# Patient Record
Sex: MALE | Race: WHITE | NOT HISPANIC OR LATINO | Employment: OTHER | URBAN - METROPOLITAN AREA
[De-identification: names, ages, dates, MRNs, and addresses within clinical notes are randomized per-mention and may not be internally consistent; named-entity substitution may affect disease eponyms.]

---

## 2017-07-17 ENCOUNTER — TRANSFERRED RECORDS (OUTPATIENT)
Dept: HEALTH INFORMATION MANAGEMENT | Facility: CLINIC | Age: 67
End: 2017-07-17

## 2017-07-25 ENCOUNTER — OFFICE VISIT (OUTPATIENT)
Dept: FAMILY MEDICINE | Facility: CLINIC | Age: 67
End: 2017-07-25
Payer: COMMERCIAL

## 2017-07-25 VITALS
WEIGHT: 183 LBS | RESPIRATION RATE: 16 BRPM | TEMPERATURE: 97 F | DIASTOLIC BLOOD PRESSURE: 70 MMHG | SYSTOLIC BLOOD PRESSURE: 130 MMHG | HEIGHT: 68 IN | BODY MASS INDEX: 27.74 KG/M2 | HEART RATE: 54 BPM

## 2017-07-25 DIAGNOSIS — Z23 NEED FOR PROPHYLACTIC VACCINATION WITH TETANUS-DIPHTHERIA (TD): ICD-10-CM

## 2017-07-25 DIAGNOSIS — Z11.59 NEED FOR HEPATITIS C SCREENING TEST: ICD-10-CM

## 2017-07-25 DIAGNOSIS — F41.9 ANXIETY: ICD-10-CM

## 2017-07-25 DIAGNOSIS — I10 HYPERTENSION GOAL BP (BLOOD PRESSURE) < 140/90: Chronic | ICD-10-CM

## 2017-07-25 DIAGNOSIS — Z00.00 ROUTINE MEDICAL EXAM: Primary | ICD-10-CM

## 2017-07-25 DIAGNOSIS — N40.0 BENIGN PROSTATIC HYPERPLASIA WITHOUT LOWER URINARY TRACT SYMPTOMS: ICD-10-CM

## 2017-07-25 DIAGNOSIS — K21.9 GASTROESOPHAGEAL REFLUX DISEASE WITHOUT ESOPHAGITIS: ICD-10-CM

## 2017-07-25 DIAGNOSIS — E78.5 HYPERLIPIDEMIA LDL GOAL <100: Chronic | ICD-10-CM

## 2017-07-25 DIAGNOSIS — F90.8 ATTENTION DEFICIT HYPERACTIVITY DISORDER (ADHD), OTHER TYPE: ICD-10-CM

## 2017-07-25 DIAGNOSIS — Z12.5 SCREENING FOR PROSTATE CANCER: ICD-10-CM

## 2017-07-25 DIAGNOSIS — F33.42 DEPRESSION, MAJOR, RECURRENT, IN COMPLETE REMISSION (H): Chronic | ICD-10-CM

## 2017-07-25 DIAGNOSIS — I10 ESSENTIAL HYPERTENSION WITH GOAL BLOOD PRESSURE LESS THAN 140/90: Chronic | ICD-10-CM

## 2017-07-25 DIAGNOSIS — M48.02 SPINAL STENOSIS OF CERVICAL REGION: ICD-10-CM

## 2017-07-25 LAB
ALBUMIN SERPL-MCNC: 4.3 G/DL (ref 3.4–5)
ALBUMIN UR-MCNC: NEGATIVE MG/DL
ALP SERPL-CCNC: 65 U/L (ref 40–150)
ALT SERPL W P-5'-P-CCNC: 44 U/L (ref 0–70)
ANION GAP SERPL CALCULATED.3IONS-SCNC: 4 MMOL/L (ref 3–14)
APPEARANCE UR: CLEAR
AST SERPL W P-5'-P-CCNC: 27 U/L (ref 0–45)
BASOPHILS # BLD AUTO: 0 10E9/L (ref 0–0.2)
BASOPHILS NFR BLD AUTO: 0.2 %
BILIRUB SERPL-MCNC: 0.8 MG/DL (ref 0.2–1.3)
BILIRUB UR QL STRIP: NEGATIVE
BUN SERPL-MCNC: 20 MG/DL (ref 7–30)
CALCIUM SERPL-MCNC: 8.9 MG/DL (ref 8.5–10.1)
CHLORIDE SERPL-SCNC: 104 MMOL/L (ref 94–109)
CHOLEST SERPL-MCNC: 159 MG/DL
CO2 SERPL-SCNC: 29 MMOL/L (ref 20–32)
COLOR UR AUTO: YELLOW
CREAT SERPL-MCNC: 0.78 MG/DL (ref 0.66–1.25)
DIFFERENTIAL METHOD BLD: NORMAL
EOSINOPHIL # BLD AUTO: 0.1 10E9/L (ref 0–0.7)
EOSINOPHIL NFR BLD AUTO: 1.8 %
ERYTHROCYTE [DISTWIDTH] IN BLOOD BY AUTOMATED COUNT: 13.3 % (ref 10–15)
GFR SERPL CREATININE-BSD FRML MDRD: ABNORMAL ML/MIN/1.7M2
GLUCOSE SERPL-MCNC: 100 MG/DL (ref 70–99)
GLUCOSE UR STRIP-MCNC: NEGATIVE MG/DL
HCT VFR BLD AUTO: 41.8 % (ref 40–53)
HCV AB SERPL QL IA: NORMAL
HDLC SERPL-MCNC: 67 MG/DL
HGB BLD-MCNC: 14 G/DL (ref 13.3–17.7)
HGB UR QL STRIP: NEGATIVE
KETONES UR STRIP-MCNC: NEGATIVE MG/DL
LDLC SERPL CALC-MCNC: 71 MG/DL
LEUKOCYTE ESTERASE UR QL STRIP: NEGATIVE
LYMPHOCYTES # BLD AUTO: 1 10E9/L (ref 0.8–5.3)
LYMPHOCYTES NFR BLD AUTO: 15.9 %
MCH RBC QN AUTO: 30.6 PG (ref 26.5–33)
MCHC RBC AUTO-ENTMCNC: 33.5 G/DL (ref 31.5–36.5)
MCV RBC AUTO: 92 FL (ref 78–100)
MONOCYTES # BLD AUTO: 0.6 10E9/L (ref 0–1.3)
MONOCYTES NFR BLD AUTO: 10.7 %
NEUTROPHILS # BLD AUTO: 4.3 10E9/L (ref 1.6–8.3)
NEUTROPHILS NFR BLD AUTO: 71.4 %
NITRATE UR QL: NEGATIVE
NONHDLC SERPL-MCNC: 92 MG/DL
PH UR STRIP: 6 PH (ref 5–7)
PLATELET # BLD AUTO: 201 10E9/L (ref 150–450)
POTASSIUM SERPL-SCNC: 4.2 MMOL/L (ref 3.4–5.3)
PROT SERPL-MCNC: 7.2 G/DL (ref 6.8–8.8)
PSA SERPL-ACNC: 3.48 UG/L (ref 0–4)
RBC # BLD AUTO: 4.57 10E12/L (ref 4.4–5.9)
RBC #/AREA URNS AUTO: NORMAL /HPF (ref 0–2)
SODIUM SERPL-SCNC: 137 MMOL/L (ref 133–144)
SP GR UR STRIP: 1.02 (ref 1–1.03)
TRIGL SERPL-MCNC: 105 MG/DL
URN SPEC COLLECT METH UR: NORMAL
UROBILINOGEN UR STRIP-ACNC: 0.2 EU/DL (ref 0.2–1)
WBC # BLD AUTO: 6 10E9/L (ref 4–11)
WBC #/AREA URNS AUTO: NORMAL /HPF (ref 0–2)

## 2017-07-25 PROCEDURE — 90471 IMMUNIZATION ADMIN: CPT | Mod: GY | Performed by: FAMILY MEDICINE

## 2017-07-25 PROCEDURE — 80053 COMPREHEN METABOLIC PANEL: CPT | Performed by: FAMILY MEDICINE

## 2017-07-25 PROCEDURE — 36415 COLL VENOUS BLD VENIPUNCTURE: CPT | Performed by: FAMILY MEDICINE

## 2017-07-25 PROCEDURE — G0103 PSA SCREENING: HCPCS | Performed by: FAMILY MEDICINE

## 2017-07-25 PROCEDURE — 81001 URINALYSIS AUTO W/SCOPE: CPT | Performed by: FAMILY MEDICINE

## 2017-07-25 PROCEDURE — 99397 PER PM REEVAL EST PAT 65+ YR: CPT | Mod: 25 | Performed by: FAMILY MEDICINE

## 2017-07-25 PROCEDURE — 80061 LIPID PANEL: CPT | Performed by: FAMILY MEDICINE

## 2017-07-25 PROCEDURE — 85025 COMPLETE CBC W/AUTO DIFF WBC: CPT | Performed by: FAMILY MEDICINE

## 2017-07-25 PROCEDURE — 90714 TD VACC NO PRESV 7 YRS+ IM: CPT | Mod: GY | Performed by: FAMILY MEDICINE

## 2017-07-25 PROCEDURE — 86803 HEPATITIS C AB TEST: CPT | Performed by: FAMILY MEDICINE

## 2017-07-25 RX ORDER — CYCLOBENZAPRINE HCL 10 MG
TABLET ORAL
Qty: 90 TABLET | Refills: 0 | Status: SHIPPED | OUTPATIENT
Start: 2017-07-25 | End: 2017-11-27

## 2017-07-25 RX ORDER — DUTASTERIDE 0.5 MG/1
0.5 CAPSULE, LIQUID FILLED ORAL DAILY
Qty: 90 CAPSULE | Refills: 3 | Status: SHIPPED | OUTPATIENT
Start: 2017-07-25 | End: 2018-08-21

## 2017-07-25 RX ORDER — MULTIVITAMIN WITH IRON
1 TABLET ORAL DAILY
COMMUNITY

## 2017-07-25 RX ORDER — ATORVASTATIN CALCIUM 80 MG/1
80 TABLET, FILM COATED ORAL DAILY
Qty: 90 TABLET | Refills: 3 | Status: SHIPPED | OUTPATIENT
Start: 2017-07-25 | End: 2018-08-21

## 2017-07-25 RX ORDER — LISINOPRIL 10 MG/1
10 TABLET ORAL DAILY
Qty: 90 TABLET | Refills: 3 | Status: SHIPPED | OUTPATIENT
Start: 2017-07-25 | End: 2018-08-21

## 2017-07-25 NOTE — PROGRESS NOTES
SUBJECTIVE:   Stuart Moran is a 67 year old male who presents for Preventive Visit.  click delete button to remove this line now  click delete button to remove this line now  Are you in the first 12 months of your Medicare Part B coverage?  No    Healthy Habits:    Do you get at least three servings of calcium containing foods daily (dairy, green leafy vegetables, etc.)? yes    Amount of exercise or daily activities, outside of work: 0 day(s) per week    Problems taking medications regularly No    Medication side effects: No    Have you had an eye exam in the past two years? yes    Do you see a dentist twice per year? yes    Do you have sleep apnea, excessive snoring or daytime drowsiness?no    COGNITIVE SCREEN  1) Repeat 3 items (Banana, Sunrise, Chair)    2) Clock draw: NORMAL  3) 3 item recall: Recalls 3 objects  Results: 3 items recalled: COGNITIVE IMPAIRMENT LESS LIKELY    Mini-CogTM Copyright DOUGLAS Juan. Licensed by the author for use in [facility]; reprinted with permission (main@[facility]). All rights reserved.            Reviewed and updated as needed this visit by clinical staffTobacco  Allergies  Meds         Reviewed and updated as needed this visit by Provider        Social History   Substance Use Topics     Smoking status: Never Smoker     Smokeless tobacco: Never Used     Alcohol use No       The patient does not drink >3 drinks per day nor >7 drinks per week.    Today's PHQ-2 Score:   PHQ-2 ( 1999 Pfizer) 7/25/2017 10/15/2015   Q1: Little interest or pleasure in doing things 0 0   Q2: Feeling down, depressed or hopeless 0 0   PHQ-2 Score 0 0   Q1: Little interest or pleasure in doing things - -   Q2: Feeling down, depressed or hopeless - -   PHQ-2 Score - -         Do you feel safe in your environment - Yes    Do you have a Health Care Directive?: Yes: Advance Directive has been received and scanned.    Current providers sharing in care for this patient include: Patient Care  "Team:  Lucas Tompkins MD as PCP - General (Family Practice)      Hearing impairment: No    Ability to successfully perform activities of daily living: Yes, no assistance needed     Fall risk:  Fallen 2 or more times in the past year?: No  Any fall with injury in the past year?: No      Home safety:  none identified  click delete button to remove this line now    The following health maintenance items are reviewed in Epic and correct as of today:Health Maintenance   Topic Date Due     HEPATITIS C SCREENING  01/27/1968     AORTIC ANEURYSM SCREENING (SYSTEM ASSIGNED)  01/27/2015     PNEUMOCOCCAL (2 of 2 - PPSV23) 10/15/2016     DEPRESSION ACTION PLAN Q1 YR  10/15/2016     PHQ-9 Q6 MONTHS  12/06/2016     TETANUS IMMUNIZATION (SYSTEM ASSIGNED)  02/01/2017     FALL RISK ASSESSMENT  03/14/2017     LIPID MONITORING Q1 YEAR  06/06/2017     INFLUENZA VACCINE (SYSTEM ASSIGNED)  09/01/2017     ADVANCE DIRECTIVE PLANNING Q5 YRS  08/22/2018     COLON CANCER SCREEN (SYSTEM ASSIGNED)  03/02/2020     Labs reviewed in EPIC        ROS:  C: NEGATIVE for fever, chills, change in weight  I: NEGATIVE for worrisome rashes, moles or lesions  E: NEGATIVE for vision changes or irritation  E/M: NEGATIVE for ear, mouth and throat problems  R: NEGATIVE for significant cough or SOB  B: NEGATIVE for masses, tenderness or discharge  CV: NEGATIVE for chest pain, palpitations or peripheral edema  GI: NEGATIVE for nausea, abdominal pain, heartburn, or change in bowel habits  : NEGATIVE for frequency, dysuria, or hematuria  MUSCULOSKELETAL:POSITIVE  for arthralgias  and neck pain  N: NEGATIVE for weakness, dizziness or paresthesias  E: NEGATIVE for temperature intolerance, skin/hair changes  H: NEGATIVE for bleeding problems  P: NEGATIVE for changes in mood or affect    OBJECTIVE:   /70  Pulse 54  Temp 97  F (36.1  C) (Tympanic)  Resp 16  Ht 5' 8\" (1.727 m)  Wt 183 lb (83 kg)  BMI 27.83 kg/m2 Estimated body mass index is 27.83 " "kg/(m^2) as calculated from the following:    Height as of this encounter: 5' 8\" (1.727 m).    Weight as of this encounter: 183 lb (83 kg).  EXAM:   GENERAL: healthy, alert and no distress  EYES: Eyes grossly normal to inspection, PERRL and conjunctivae and sclerae normal  HENT: ear canals and TM's normal, nose and mouth without ulcers or lesions  NECK: no adenopathy, no asymmetry, masses, or scars and thyroid normal to palpation  RESP: lungs clear to auscultation - no rales, rhonchi or wheezes  CV: regular rate and rhythm, normal S1 S2, no S3 or S4, no murmur, click or rub, no peripheral edema and peripheral pulses strong  ABDOMEN: soft, nontender, no hepatosplenomegaly, no masses and bowel sounds normal   (male): normal male genitalia without lesions or urethral discharge, no hernia  RECTAL: normal sphincter tone, no rectal masses, prostate normal size, smooth, nontender without nodules or masses  MS: no gross musculoskeletal defects noted, no edema  SKIN: no suspicious lesions or rashes  NEURO: Normal strength and tone, mentation intact and speech normal  PSYCH: mentation appears normal, affect normal/bright  LYMPH: no cervical, supraclavicular, axillary, or inguinal adenopathy    ASSESSMENT / PLAN:       ICD-10-CM    1. Routine medical exam Z00.00    2. Hypertension goal BP (blood pressure) < 140/90 I10    3. Depression, major, recurrent, in complete remission (H) F33.42    4. Hyperlipidemia LDL goal <100 E78.5    5. Need for hepatitis C screening test Z11.59    6. Need for prophylactic vaccination with tetanus-diphtheria (TD) Z23    7. Attention deficit hyperactivity disorder (ADHD), other type F90.8    8. Benign prostatic hyperplasia without lower urinary tract symptoms N40.0    9. Spinal stenosis of cervical region M48.02    10. Anxiety F41.9    11. Gastroesophageal reflux disease without esophagitis K21.9        End of Life Planning:  Patient currently has an advanced directive: Yes.  Practitioner is " "supportive of decision.    COUNSELING:  Reviewed preventive health counseling, as reflected in patient instructions       Dental care       Immunizations    Vaccinated for: Td              Estimated body mass index is 27.83 kg/(m^2) as calculated from the following:    Height as of this encounter: 5' 8\" (1.727 m).    Weight as of this encounter: 183 lb (83 kg).     reports that he has never smoked. He has never used smokeless tobacco.        Appropriate preventive services were discussed with this patient, including applicable screening as appropriate for cardiovascular disease, diabetes, osteopenia/osteoporosis, and glaucoma.  As appropriate for age/gender, discussed screening for colorectal cancer, prostate cancer, breast cancer, and cervical cancer. Checklist reviewing preventive services available has been given to the patient.    Reviewed patients plan of care and provided an AVS. The Basic Care Plan (routine screening as documented in Health Maintenance) for Stuart meets the Care Plan requirement. This Care Plan has been established and reviewed with the Patient.    Counseling Resources:  ATP IV Guidelines  Pooled Cohorts Equation Calculator  Breast Cancer Risk Calculator  FRAX Risk Assessment  ICSI Preventive Guidelines  Dietary Guidelines for Americans, 2010  USDA's MyPlate  ASA Prophylaxis  Lung CA Screening    Lucas Tompkins MD  [facility]  "

## 2017-07-25 NOTE — NURSING NOTE
Prior to injection verified patient identity using patient's name and date of birth.    Screening Questionnaire for Adult Immunization    Are you sick today?   No   Do you have allergies to medications, food, a vaccine component or latex?   No   Have you ever had a serious reaction after receiving a vaccination?   No   Do you have a long-term health problem with heart disease, lung disease, asthma, kidney disease, metabolic disease (e.g. diabetes), anemia, or other blood disorder?   No   Do you have cancer, leukemia, HIV/AIDS, or any other immune system problem?   No   In the past 3 months, have you taken medications that affect  your immune system, such as prednisone, other steroids, or anticancer drugs; drugs for the treatment of rheumatoid arthritis, Crohn s disease, or psoriasis; or have you had radiation treatments?   No   Have you had a seizure, or a brain or other nervous system problem?   No   During the past year, have you received a transfusion of blood or blood     products, or been given immune (gamma) globulin or antiviral drug?   No   For women: Are you pregnant or is there a chance you could become        pregnant during the next month?   No   Have you received any vaccinations in the past 4 weeks?   No     Immunization questionnaire answers were all negative.      MNVFC doesn't apply on this patient    Per orders of Dr. Tompkins, injection of Td given by Beth Lopes. Patient instructed to remain in clinic for 15 minutes afterwards, and to report any adverse reaction to me immediately.       Screening performed by Beth Lopes on 7/25/2017 at 10:29 AM.

## 2017-07-25 NOTE — MR AVS SNAPSHOT
After Visit Summary   7/25/2017    Stuart Moran    MRN: [id number]           Patient Information     Date Of Birth          1950        Visit Information        Provider Department      7/25/2017 9:45 AM Lucas Tompkins MD [facility]        Today's Diagnoses     Routine medical exam    -  1    Hypertension goal BP (blood pressure) < 140/90        Depression, major, recurrent, in complete remission (H)        Hyperlipidemia LDL goal <100        Need for hepatitis C screening test        Need for prophylactic vaccination with tetanus-diphtheria (TD)        Attention deficit hyperactivity disorder (ADHD), other type        Benign prostatic hyperplasia without lower urinary tract symptoms        Spinal stenosis of cervical region        Anxiety        Gastroesophageal reflux disease without esophagitis        Screening for prostate cancer        Essential hypertension with goal blood pressure less than 140/90          Care Instructions      Preventive Health Recommendations:   Male Ages 65 and over    Yearly exam:             See your health care provider every year in order to  o   Review health changes.   o   Discuss preventive care.    o   Review your medicines if your doctor has prescribed any.    Talk with your health care provider about whether you should have a test to screen for prostate cancer (PSA).    Every 3 years, have a diabetes test (fasting glucose). If you are at risk for diabetes, you should have this test more often.    Every 5 years, have a cholesterol test. Have this test more often if you are at risk for high cholesterol or heart disease.     Every 10 years, have a colonoscopy. Or, have a yearly FIT test (stool test). These exams will check for colon cancer.    Talk to with your health care provider about screening for Abdominal Aortic Aneurysm if you have a family history of AAA or have a history of smoking.    Shots:     Get a flu shot  each year.     Get a tetanus shot every 10 years.     Talk to your doctor about your pneumonia vaccines. There are now two you should receive - Pneumovax (PPSV 23) and Prevnar (PCV 13).     Talk to your doctor about a shingles vaccine.     Talk to your doctor about the hepatitis B vaccine.  Nutrition:     Eat at least 5 servings of fruits and vegetables each day.     Eat whole-grain bread, whole-wheat pasta and brown rice instead of white grains and rice.     Talk to your provider about Calcium and Vitamin D.   Lifestyle    Exercise for at least 150 minutes a week (30 minutes a day, 5 days a week). This will help you control your weight and prevent disease.     Limit alcohol to one drink per day.     No smoking.     Wear sunscreen to prevent skin cancer.     See your dentist every six months for an exam and cleaning.     See your eye doctor every 1 to 2 years to screen for conditions such as glaucoma, macular degeneration, cataracts, etc           Follow-ups after your visit        Who to contact     If you have questions or need follow up information about today's clinic visit or your schedule please contact [facility] directly at [phone].  Normal or non-critical lab and imaging results will be communicated to you by [email or web address], letter or phone within 4 business days after the clinic has received the results. If you do not hear from us within 7 days, please contact the clinic through [email or web address] or phone. If you have a critical or abnormal lab result, we will notify you by phone as soon as possible.  Submit refill requests through [employer] or call your pharmacy and they will forward the refill request to us. Please allow 3 business days for your refill to be completed.          Additional Information About Your Visit        [email or web address] Information     Possible Web gives you secure access to your electronic health record. If you see a primary care provider, you can also send messages to your care  "team and make appointments. If you have questions, please call your primary care clinic.  If you do not have a primary care provider, please call [phone] and they will assist you.        Care EveryWhere ID     This is your Care EveryWhere ID. This could be used by other organizations to access your [place] medical records  [id number]        Your Vitals Were     Pulse Temperature Respirations Height BMI (Body Mass Index)       54 97  F (36.1  C) (Tympanic) 16 5' 8\" (1.727 m) 27.83 kg/m2        Blood Pressure from Last 3 Encounters:   07/25/17 130/70   06/06/16 136/80   03/14/16 126/70    Weight from Last 3 Encounters:   07/25/17 183 lb (83 kg)   06/06/16 187 lb (84.8 kg)   03/14/16 190 lb (86.2 kg)              We Performed the Following     ADMIN 1st VACCINE     CBC with platelets differential     Comprehensive metabolic panel     DEPRESSION ACTION PLAN (DAP)     Hepatitis C Screen Reflex to HCV RNA Quant and Genotype     Lipid panel reflex to direct LDL     Prostate spec antigen screen     TD (ADULT, 7+) PRESERVE FREE     UA with Microscopic          Today's Medication Changes          These changes are accurate as of: 7/25/17 11:22 AM.  If you have any questions, ask your nurse or doctor.               Start taking these medicines.        Dose/Directions    ranitidine 150 MG tablet   Commonly known as:  ZANTAC   Used for:  Gastroesophageal reflux disease without esophagitis   Started by:  Lucas Tompkins MD        Dose:  150 mg   Take 1 tablet (150 mg) by mouth 2 times daily   Quantity:  180 tablet   Refills:  3         These medicines have changed or have updated prescriptions.        Dose/Directions    atorvastatin 80 MG tablet   Commonly known as:  LIPITOR   This may have changed:  See the new instructions.   Used for:  Hyperlipidemia LDL goal <100   Changed by:  Lucas Tompkins MD        Dose:  80 mg   Take 1 tablet (80 mg) by mouth daily   Quantity:  90 tablet   Refills:  3       " cyclobenzaprine 10 MG tablet   Commonly known as:  FLEXERIL   This may have changed:  See the new instructions.   Used for:  Spinal stenosis of cervical region   Changed by:  Lucas Tompkins MD        TAKE 1 TABLET BY MOUTH THREE TIMES DAILY AS NEEDED FOR MUSCLE SPASMS.   Quantity:  90 tablet   Refills:  0       lisinopril 10 MG tablet   Commonly known as:  PRINIVIL/ZESTRIL   This may have changed:  See the new instructions.   Used for:  Essential hypertension with goal blood pressure less than 140/90   Changed by:  Lucas Tompkins MD        Dose:  10 mg   Take 1 tablet (10 mg) by mouth daily   Quantity:  90 tablet   Refills:  3         Stop taking these medicines if you haven't already. Please contact your care team if you have questions.     aspirin 81 MG tablet   Stopped by:  Lucas Tompkins MD           doxepin 50 MG capsule   Commonly known as:  SINEquan   Stopped by:  Lucas Tompkins MD           hydrochlorothiazide 25 MG tablet   Commonly known as:  HYDRODIURIL   Stopped by:  Lucas Tompkins MD           omeprazole 40 MG capsule   Commonly known as:  priLOSEC   Stopped by:  Lucas Tompkins MD                Where to get your medicines      These medications were sent to [id number] - [place], [place] - [address] AT [facility] [place] [facility]  [address], [place] [address]    Hours:  24-hours Phone:  [phone]     atorvastatin 80 MG tablet    cyclobenzaprine 10 MG tablet    dutasteride 0.5 MG capsule    lisinopril 10 MG tablet    ranitidine 150 MG tablet                Primary Care Provider Office Phone # Fax #    Lucas Tompkins [phone]       [facility] [address]  [place] [address]        Equal Access to Services     LUIS FELIPE SANTIAGO AH: Hadii aad ku hadasho Soomaali, waaxda luqadaha, qaybta kaalmada adeegyada, waxay darain haybarbaran joselin cullen. So [facility]  [phone].    ATENCIÓN: Si arjun can, tiene a carl disposición servicios gratuitos de asistencia lingüística. Padmini verduzco [phone].    We comply with applicable federal civil rights laws and [place] laws. We do not discriminate on the basis of race, color, national origin, age, disability sex, sexual orientation or gender identity.            Thank you!     Thank you for choosing [facility]  for your care. Our goal is always to provide you with excellent care. Hearing back from our patients is one way we can continue to improve our services. Please take a few minutes to complete the written survey that you may receive in the mail after your visit with us. Thank you!             Your Updated Medication List - Protect others around you: Learn how to safely use, store and throw away your medicines at www.disposemymeds.org.          This list is accurate as of: 7/25/17 11:22 AM.  Always use your most recent med list.                   Brand Name Dispense Instructions for use Diagnosis    ALEVE 220 MG capsule   Generic drug:  naproxen sodium      Take 220 mg by mouth 2 times daily (with meals)        atorvastatin 80 MG tablet    LIPITOR    90 tablet    Take 1 tablet (80 mg) by mouth daily    Hyperlipidemia LDL goal <100       cyclobenzaprine 10 MG tablet    FLEXERIL    90 tablet    TAKE 1 TABLET BY MOUTH THREE TIMES DAILY AS NEEDED FOR MUSCLE SPASMS.    Spinal stenosis of cervical region       dutasteride 0.5 MG capsule    AVODART    90 capsule    Take 1 capsule (0.5 mg) by mouth daily    Benign prostatic hyperplasia without lower urinary tract symptoms       EFFEXOR  MG 24 hr capsule   Generic drug:  venlafaxine      Take 2 capsules by mouth daily        lisinopril 10 MG tablet    PRINIVIL/ZESTRIL    90 tablet    Take 1 tablet (10 mg) by mouth daily    Essential hypertension with goal blood pressure less than 140/90       magnesium 250 MG tablet      Take 1 tablet by mouth  daily        ranitidine 150 MG tablet    ZANTAC    180 tablet    Take 1 tablet (150 mg) by mouth 2 times daily    Gastroesophageal reflux disease without esophagitis       VITAMIN D (CHOLECALCIFEROL) PO      Take 5,000 Units by mouth daily        VYVANSE 70 MG capsule   Generic drug:  lisdexamfetamine

## 2017-07-25 NOTE — NURSING NOTE
"Chief Complaint   Patient presents with     Physical       Initial /70  Pulse 54  Temp 97  F (36.1  C) (Tympanic)  Resp 16  Ht 5' 8\" (1.727 m)  Wt 183 lb (83 kg)  BMI 27.83 kg/m2 Estimated body mass index is 27.83 kg/(m^2) as calculated from the following:    Height as of this encounter: 5' 8\" (1.727 m).    Weight as of this encounter: 183 lb (83 kg).  Medication Reconciliation: complete     Beth Lopes CMA      "

## 2017-07-26 ASSESSMENT — PATIENT HEALTH QUESTIONNAIRE - PHQ9: SUM OF ALL RESPONSES TO PHQ QUESTIONS 1-9: 0

## 2017-07-27 NOTE — PROGRESS NOTES
Dear Stuart,    Your tests were all normal. A copy of your tests are available in My Chart.    Glad to see you at your appointment.  If you have any questions feel free to call.      Sincerely,      JACE Zurita.

## 2017-07-31 ENCOUNTER — TELEPHONE (OUTPATIENT)
Dept: FAMILY MEDICINE | Facility: CLINIC | Age: 67
End: 2017-07-31

## 2017-09-11 ENCOUNTER — TRANSFERRED RECORDS (OUTPATIENT)
Dept: HEALTH INFORMATION MANAGEMENT | Facility: CLINIC | Age: 67
End: 2017-09-11

## 2017-11-13 ENCOUNTER — OFFICE VISIT (OUTPATIENT)
Dept: FAMILY MEDICINE | Facility: CLINIC | Age: 67
End: 2017-11-13
Payer: COMMERCIAL

## 2017-11-13 VITALS
WEIGHT: 185 LBS | HEIGHT: 68 IN | OXYGEN SATURATION: 99 % | DIASTOLIC BLOOD PRESSURE: 80 MMHG | TEMPERATURE: 97.7 F | HEART RATE: 64 BPM | BODY MASS INDEX: 28.04 KG/M2 | SYSTOLIC BLOOD PRESSURE: 126 MMHG | RESPIRATION RATE: 16 BRPM

## 2017-11-13 DIAGNOSIS — F90.0 ATTENTION DEFICIT HYPERACTIVITY DISORDER (ADHD), PREDOMINANTLY INATTENTIVE TYPE: ICD-10-CM

## 2017-11-13 DIAGNOSIS — F33.42 DEPRESSION, MAJOR, RECURRENT, IN COMPLETE REMISSION (H): Chronic | ICD-10-CM

## 2017-11-13 DIAGNOSIS — I10 HYPERTENSION GOAL BP (BLOOD PRESSURE) < 140/90: Chronic | ICD-10-CM

## 2017-11-13 DIAGNOSIS — Z01.818 PREOP GENERAL PHYSICAL EXAM: Primary | ICD-10-CM

## 2017-11-13 DIAGNOSIS — M26.53: ICD-10-CM

## 2017-11-13 DIAGNOSIS — E78.5 HYPERLIPIDEMIA LDL GOAL <100: Chronic | ICD-10-CM

## 2017-11-13 LAB
ALBUMIN UR-MCNC: NEGATIVE MG/DL
APPEARANCE UR: CLEAR
BASOPHILS # BLD AUTO: 0 10E9/L (ref 0–0.2)
BASOPHILS NFR BLD AUTO: 0.2 %
BILIRUB UR QL STRIP: NEGATIVE
COLOR UR AUTO: YELLOW
DIFFERENTIAL METHOD BLD: ABNORMAL
EOSINOPHIL # BLD AUTO: 0.1 10E9/L (ref 0–0.7)
EOSINOPHIL NFR BLD AUTO: 1.7 %
ERYTHROCYTE [DISTWIDTH] IN BLOOD BY AUTOMATED COUNT: 12.9 % (ref 10–15)
GLUCOSE UR STRIP-MCNC: NEGATIVE MG/DL
HCT VFR BLD AUTO: 39 % (ref 40–53)
HGB BLD-MCNC: 13.4 G/DL (ref 13.3–17.7)
HGB UR QL STRIP: NEGATIVE
KETONES UR STRIP-MCNC: NEGATIVE MG/DL
LEUKOCYTE ESTERASE UR QL STRIP: NEGATIVE
LYMPHOCYTES # BLD AUTO: 1 10E9/L (ref 0.8–5.3)
LYMPHOCYTES NFR BLD AUTO: 16.4 %
MCH RBC QN AUTO: 31.3 PG (ref 26.5–33)
MCHC RBC AUTO-ENTMCNC: 34.4 G/DL (ref 31.5–36.5)
MCV RBC AUTO: 91 FL (ref 78–100)
MONOCYTES # BLD AUTO: 0.7 10E9/L (ref 0–1.3)
MONOCYTES NFR BLD AUTO: 11.5 %
NEUTROPHILS # BLD AUTO: 4.1 10E9/L (ref 1.6–8.3)
NEUTROPHILS NFR BLD AUTO: 70.2 %
NITRATE UR QL: NEGATIVE
PH UR STRIP: 6.5 PH (ref 5–7)
PLATELET # BLD AUTO: 210 10E9/L (ref 150–450)
RBC # BLD AUTO: 4.28 10E12/L (ref 4.4–5.9)
RBC #/AREA URNS AUTO: NORMAL /HPF
SOURCE: NORMAL
SP GR UR STRIP: 1.01 (ref 1–1.03)
UROBILINOGEN UR STRIP-ACNC: 0.2 EU/DL (ref 0.2–1)
WBC # BLD AUTO: 5.9 10E9/L (ref 4–11)
WBC #/AREA URNS AUTO: NORMAL /HPF

## 2017-11-13 PROCEDURE — 99214 OFFICE O/P EST MOD 30 MIN: CPT | Mod: 25 | Performed by: FAMILY MEDICINE

## 2017-11-13 PROCEDURE — 80048 BASIC METABOLIC PNL TOTAL CA: CPT | Performed by: FAMILY MEDICINE

## 2017-11-13 PROCEDURE — 85025 COMPLETE CBC W/AUTO DIFF WBC: CPT | Performed by: FAMILY MEDICINE

## 2017-11-13 PROCEDURE — 81001 URINALYSIS AUTO W/SCOPE: CPT | Performed by: FAMILY MEDICINE

## 2017-11-13 PROCEDURE — 36415 COLL VENOUS BLD VENIPUNCTURE: CPT | Performed by: FAMILY MEDICINE

## 2017-11-13 PROCEDURE — 93000 ELECTROCARDIOGRAM COMPLETE: CPT | Performed by: FAMILY MEDICINE

## 2017-11-13 RX ORDER — GINSENG 100 MG
1 CAPSULE ORAL DAILY
COMMUNITY

## 2017-11-13 RX ORDER — MULTIVIT WITH MINERALS/LUTEIN
1 TABLET ORAL DAILY
COMMUNITY

## 2017-11-13 NOTE — NURSING NOTE
"Chief Complaint   Patient presents with     Pre-Op Exam       Initial /80  Pulse 64  Temp 97.7  F (36.5  C) (Tympanic)  Resp 16  Ht 5' 8\" (1.727 m)  Wt 185 lb (83.9 kg)  SpO2 99%  BMI 28.13 kg/m2 Estimated body mass index is 28.13 kg/(m^2) as calculated from the following:    Height as of this encounter: 5' 8\" (1.727 m).    Weight as of this encounter: 185 lb (83.9 kg).  Medication Reconciliation: complete     Beth Lopes CMA      "

## 2017-11-13 NOTE — MR AVS SNAPSHOT
After Visit Summary   11/13/2017    Stuart Moran    MRN: [id number]           Patient Information     Date Of Birth          1950        Visit Information        Provider Department      11/13/2017 1:30 PM Lucas Tompkins MD [facility]        Today's Diagnoses     Preop general physical exam    -  1    Mandible deviation open/close        Hypertension goal BP (blood pressure) < 140/90        Depression, major, recurrent, in complete remission (H)        Hyperlipidemia LDL goal <100        Attention deficit hyperactivity disorder (ADHD), predominantly inattentive type          Care Instructions      Before Your Surgery      Call your surgeon if there is any change in your health. This includes signs of a cold or flu (such as a sore throat, runny nose, cough, rash or fever).    Do not smoke, drink alcohol or take over the counter medicine (unless your surgeon or primary care doctor tells you to) for the 24 hours before and after surgery.    If you take prescribed drugs: Follow your doctor s orders about which medicines to take and which to stop until after surgery.    Eating and drinking prior to surgery: follow the instructions from your surgeon    Take a shower or bath the night before surgery. Use the soap your surgeon gave you to gently clean your skin. If you do not have soap from your surgeon, use your regular soap. Do not shave or scrub the surgery site.  Wear clean pajamas and have clean sheets on your bed.           Follow-ups after your visit        Your next 10 appointments already scheduled     Nov 29, 2017   Procedure with Daniel Hyde DDS   [facility] (--)    [address]  [facility] [id number] [phone]              Who to contact     If you have questions or need follow up information about today's clinic visit or your schedule please contact [facility] directly at  "[phone].  Normal or non-critical lab and imaging results will be communicated to you by MyChart, letter or phone within 4 business days after the clinic has received the results. If you do not hear from us within 7 days, please contact the clinic through [employer] or phone. If you have a critical or abnormal lab result, we will notify you by phone as soon as possible.  Submit refill requests through [employer] or call your pharmacy and they will forward the refill request to us. Please allow 3 business days for your refill to be completed.          Additional Information About Your Visit        [employer] Information     [employer] gives you secure access to your electronic health record. If you see a primary care provider, you can also send messages to your care team and make appointments. If you have questions, please call your primary care clinic.  If you do not have a primary care provider, please call [phone] and they will assist you.        Care EveryWhere ID     This is your Care EveryWhere ID. This could be used by other organizations to access your [place] medical records  [id number]        Your Vitals Were     Pulse Temperature Respirations Height Pulse Oximetry BMI (Body Mass Index)    64 97.7  F (36.5  C) (Tympanic) 16 5' 8\" (1.727 m) 99% 28.13 kg/m2       Blood Pressure from Last 3 Encounters:   11/13/17 126/80   07/25/17 130/70   06/06/16 136/80    Weight from Last 3 Encounters:   11/13/17 185 lb (83.9 kg)   07/25/17 183 lb (83 kg)   06/06/16 187 lb (84.8 kg)              We Performed the Following     Basic metabolic panel     CBC with platelets differential     EKG 12-lead complete w/read - Clinics     UA with Microscopic        Primary Care Provider Office Phone # Fax #    Lucas Tompkins [phone] [address] [facility] [address]        Equal Access to Services     LUIS FELIPE SANTIAGO AH: Kayli Johnson, waaxda luqadaha, qaybta virgil mena, " mihai cabral olga cassidy'aan ah. So [facility] [phone].    ATENCIÓN: Si arjun can, tiene a carl disposición servicios gratuitos de asistencia lingüística. Padmini verduzco [phone].    We comply with applicable federal civil rights laws and [place] laws. We do not discriminate on the basis of race, color, national origin, age, disability, sex, sexual orientation, or gender identity.            Thank you!     Thank you for choosing [facility]  for your care. Our goal is always to provide you with excellent care. Hearing back from our patients is one way we can continue to improve our services. Please take a few minutes to complete the written survey that you may receive in the mail after your visit with us. Thank you!             Your Updated Medication List - Protect others around you: Learn how to safely use, store and throw away your medicines at www.disposemymeds.org.          This list is accurate as of: 11/13/17  2:33 PM.  Always use your most recent med list.                   Brand Name Dispense Instructions for use Diagnosis    ALEVE 220 MG capsule   Generic drug:  naproxen sodium      Take 220 mg by mouth 2 times daily (with meals)        atorvastatin 80 MG tablet    LIPITOR    90 tablet    Take 1 tablet (80 mg) by mouth daily    Hyperlipidemia LDL goal <100       CENTRUM SILVER per tablet      Take 1 tablet by mouth daily        cyclobenzaprine 10 MG tablet    FLEXERIL    90 tablet    TAKE 1 TABLET BY MOUTH THREE TIMES DAILY AS NEEDED FOR MUSCLE SPASMS.    Spinal stenosis of cervical region       dutasteride 0.5 MG capsule    AVODART    90 capsule    Take 1 capsule (0.5 mg) by mouth daily    Benign prostatic hyperplasia without lower urinary tract symptoms       EFFEXOR  MG 24 hr capsule   Generic drug:  venlafaxine      Take 2 capsules by mouth daily        lisinopril 10 MG tablet    PRINIVIL/ZESTRIL    90 tablet    Take 1 tablet (10 mg) by mouth daily     Essential hypertension with goal blood pressure less than 140/90       magnesium 250 MG tablet      Take 1 tablet by mouth daily        OSTEO BI-FLEX REGULAR STRENGTH 250-200 MG Tabs   Generic drug:  Glucosamine-Chondroitin           ranitidine 150 MG tablet    ZANTAC    180 tablet    Take 1 tablet (150 mg) by mouth 2 times daily    Gastroesophageal reflux disease without esophagitis       VITAMIN D (CHOLECALCIFEROL) PO      Take 5,000 Units by mouth daily        VITAMIN E NATURAL PO      Take 400 Units by mouth daily        VYVANSE 70 MG capsule   Generic drug:  lisdexamfetamine           zinc 50 MG Tabs      Take 1 tablet by mouth daily

## 2017-11-13 NOTE — PROGRESS NOTES
[facility]  [address] [address]  [phone]  Dept: [phone]    PRE-OP EVALUATION:  Today's date: 2017    Stuart Moran (: 1950) presents for pre-operative evaluation assessment as requested by Dr. Hyed.  He requires evaluation and anesthesia risk assessment prior to undergoing surgery/procedure for treatment of Oral-Maxillofacial .  Proposed procedure: COSMETIC OSTEOTOMY SAGITTAL SPLIT WITH MANDIBLE ADVANCEMENT    Date of Surgery/ Procedure: 17  Time of Surgery/ Procedure:   Hospital/Surgical Facility: [facility]  Fax number for surgical facility:   Primary Physician: Lucas Tompkins  Type of Anesthesia Anticipated: General    Patient has a Health Care Directive or Living Will:  yes    Preop Questions 2017   1.  Do you have a history of heart attack, stroke, stent, bypass or surgery on an artery in the head, neck, heart or legs? No   2.  Do you ever have any pain or discomfort in your chest? No   3.  Do you have a history of  Heart Failure? No   4.   Are you troubled by shortness of breath when:  walking on a level surface, or up a slight hill, or at night? No   5.  Do you currently have a cold, bronchitis or other respiratory infection? No   6.  Do you have a cough, shortness of breath, or wheezing? No   7.  Do you sometimes get pains in the calves of your legs when you walk? No   8. Do you or anyone in your family have previous history of blood clots? No   9.  Do you or does anyone in your family have a serious bleeding problem such as prolonged bleeding following surgeries or cuts? No   10. Have you ever had problems with anemia or been told to take iron pills? No   11. Have you had any abnormal blood loss such as black, tarry or bloody stools? No           HPI:                                                      Brief HPI related to upcoming procedure:       See problem list for active medical problems.   Problems all longstanding and stable, except as noted/documented.  See ROS for pertinent symptoms related to these conditions.                                                                                                  .    MEDICAL HISTORY:                                                    Patient Active Problem List    Diagnosis Date Noted     Screening for prostate cancer 07/25/2017     Priority: Medium     Gastroesophageal reflux disease without esophagitis 03/14/2016     Priority: Medium     Back muscle spasm 05/16/2015     Priority: Medium     Anxiety      Priority: Medium     ACP (advance care planning) 08/22/2013     Priority: Medium     Advance Care Planning:   Receipt of ACP document:  Received: Health Care Directive which was witnessed or notarized on 6-9-97.  Document previously scanned on 12-3-07 in [employer]-converted to [employer] on 8/22/13.  Validation form completed and sent to be scanned.  Code Status needs to be updated to reflect patient choices.  Confirmed/documented designated decision maker(s). See permanent comments section of demographics in clinical tab. View document(s) and details by clicking on code status. Added by Tosha Martinez RN, System ACP Coordinator on 8/22/2013.             Hypertension goal BP (blood pressure) < 140/90 08/15/2013     Priority: Medium     Spinal stenosis of cervical region 08/15/2013     Priority: Medium     ADHD (attention deficit hyperactivity disorder)      Priority: Medium     Depression, major, recurrent, in complete remission (H)      Priority: Medium     Insomnia      Priority: Medium     Hyperlipidemia LDL goal <100      Priority: Medium     BPH (benign prostatic hyperplasia)      Priority: Medium      Past Medical History:   Diagnosis Date     ADHD (attention deficit hyperactivity disorder)      Anxiety      Arthritis      BPH (benign prostatic hyperplasia)      Depression, major, recurrent, in complete remission (H)      Hyperlipidemia LDL goal <100       Hypertension      Insomnia      Past Surgical History:   Procedure Laterality Date     APPENDECTOMY       CARPAL TUNNEL RELEASE RT/LT      bilateral     CATARACT IOL, RT/LT Bilateral 2016     EYE SURGERY      bilateral strabismus     HERNIA REPAIR      umbilical     ORTHOPEDIC SURGERY      left knee meniscus     Current Outpatient Prescriptions   Medication Sig Dispense Refill     Multiple Vitamins-Minerals (CENTRUM SILVER) per tablet Take 1 tablet by mouth daily       Glucosamine-Chondroitin (OSTEO BI-FLEX REGULAR STRENGTH) 250-200 MG TABS        zinc 50 MG TABS Take 1 tablet by mouth daily       VITAMIN E NATURAL PO Take 400 Units by mouth daily       VITAMIN D, CHOLECALCIFEROL, PO Take 5,000 Units by mouth daily       magnesium 250 MG tablet Take 1 tablet by mouth daily       atorvastatin (LIPITOR) 80 MG tablet Take 1 tablet (80 mg) by mouth daily 90 tablet 3     lisinopril (PRINIVIL/ZESTRIL) 10 MG tablet Take 1 tablet (10 mg) by mouth daily 90 tablet 3     cyclobenzaprine (FLEXERIL) 10 MG tablet TAKE 1 TABLET BY MOUTH THREE TIMES DAILY AS NEEDED FOR MUSCLE SPASMS. 90 tablet 0     dutasteride (AVODART) 0.5 MG capsule Take 1 capsule (0.5 mg) by mouth daily 90 capsule 3     ranitidine (ZANTAC) 150 MG tablet Take 1 tablet (150 mg) by mouth 2 times daily 180 tablet 3     VYVANSE 70 MG capsule   0     venlafaxine (EFFEXOR XR) 150 MG 24 hr capsule Take 2 capsules by mouth daily       naproxen sodium (ALEVE) 220 MG capsule Take 220 mg by mouth 2 times daily (with meals)       OTC products: None, except as noted above    Allergies   Allergen Reactions     Paxil [Paroxetine Mesylate]       Latex Allergy: NO    Social History   Substance Use Topics     Smoking status: Never Smoker     Smokeless tobacco: Never Used     Alcohol use No     History   Drug Use No       REVIEW OF SYSTEMS:                                                    C: NEGATIVE for fever, chills, change in weight  I: NEGATIVE for worrisome rashes, moles or  "lesions  E: NEGATIVE for vision changes or irritation  E/M: NEGATIVE for ear, mouth and throat problems  R: NEGATIVE for significant cough or SOB  B: NEGATIVE for masses, tenderness or discharge  CV: NEGATIVE for chest pain, palpitations or peripheral edema  GI: NEGATIVE for nausea, abdominal pain, heartburn, or change in bowel habits  : NEGATIVE for frequency, dysuria, or hematuria  M: NEGATIVE for significant arthralgias or myalgia  N: NEGATIVE for weakness, dizziness or paresthesias  E: NEGATIVE for temperature intolerance, skin/hair changes  H: NEGATIVE for bleeding problems  P: NEGATIVE for changes in mood or affect    EXAM:                                                    /80  Pulse 64  Temp 97.7  F (36.5  C) (Tympanic)  Resp 16  Ht 5' 8\" (1.727 m)  Wt 185 lb (83.9 kg)  SpO2 99%  BMI 28.13 kg/m2    GENERAL APPEARANCE: healthy, alert and no distress     EYES: EOMI,  PERRL     HENT: ear canals and TM's normal and nose and mouth without ulcers or lesions. Patient has underbite and full set of braces     NECK: no adenopathy, no asymmetry, masses, or scars and thyroid normal to palpation     RESP: lungs clear to auscultation - no rales, rhonchi or wheezes     CV: regular rates and rhythm, normal S1 S2, no S3 or S4 and no murmur, click or rub     ABDOMEN:  soft, nontender, no HSM or masses and bowel sounds normal     MS: extremities normal- no gross deformities noted, no evidence of inflammation in joints, FROM in all extremities.     SKIN: no suspicious lesions or rashes     NEURO: Normal strength and tone, sensory exam grossly normal, mentation intact and speech normal     PSYCH: mentation appears normal. and affect normal/bright     LYMPHATICS: No axillary, cervical, or supraclavicular nodes    DIAGNOSTICS:                                                    EKG: appears normal, NSR, normal axis, normal intervals, no acute ST/T changes c/w ischemia, no LVH by voltage criteria    Recent Labs   Lab " Test  07/25/17   1026  06/06/16   0844  10/15/15   0840   HGB  14.0   --   13.7   PLT  201   --   196   NA  137  135  136   POTASSIUM  4.2  4.1  4.1   CR  0.78  0.90  1.00        IMPRESSION:                                                    Reason for surgery/procedure: significant problems with severe underbite/malocclusion of the mandible    The proposed surgical procedure is considered INTERMEDIATE risk.    REVISED CARDIAC RISK INDEX  The patient has the following serious cardiovascular risks for perioperative complications such as (MI, PE, VFib and 3  AV Block):  No serious cardiac risks  INTERPRETATION: 0 risks: Class I (very low risk - 0.4% complication rate)    The patient has the following additional risks for perioperative complications:  No identified additional risks      ICD-10-CM    1. Preop general physical exam Z01.818 UA with Microscopic     CBC with platelets differential     Basic metabolic panel     EKG 12-lead complete w/read - Clinics   2. Mandible deviation open/close M26.53    3. Hypertension goal BP (blood pressure) < 140/90 I10    4. Depression, major, recurrent, in complete remission (H) F33.42    5. Hyperlipidemia LDL goal <100 E78.5    6. Attention deficit hyperactivity disorder (ADHD), predominantly inattentive type F90.0        RECOMMENDATIONS:                                                              APPROVAL GIVEN to proceed with proposed procedure, without further diagnostic evaluation       Signed Electronically by: Lucas Tompkins MD    Copy of this evaluation report is provided to requesting physician.    [facility] Preop Guidelines

## 2017-11-14 LAB
ANION GAP SERPL CALCULATED.3IONS-SCNC: 11 MMOL/L (ref 3–14)
BUN SERPL-MCNC: 17 MG/DL (ref 7–30)
CALCIUM SERPL-MCNC: 9.3 MG/DL (ref 8.5–10.1)
CHLORIDE SERPL-SCNC: 104 MMOL/L (ref 94–109)
CO2 SERPL-SCNC: 21 MMOL/L (ref 20–32)
CREAT SERPL-MCNC: 0.67 MG/DL (ref 0.66–1.25)
GFR SERPL CREATININE-BSD FRML MDRD: >90 ML/MIN/1.7M2
GLUCOSE SERPL-MCNC: 89 MG/DL (ref 70–99)
POTASSIUM SERPL-SCNC: 4.2 MMOL/L (ref 3.4–5.3)
SODIUM SERPL-SCNC: 136 MMOL/L (ref 133–144)

## 2017-11-27 NOTE — H&P (VIEW-ONLY)
[facility]  [address] [address]  [phone]  Dept: [phone]    PRE-OP EVALUATION:  Today's date: 2017    Stuart Moran (: 1950) presents for pre-operative evaluation assessment as requested by Dr. Hyde.  He requires evaluation and anesthesia risk assessment prior to undergoing surgery/procedure for treatment of Oral-Maxillofacial .  Proposed procedure: COSMETIC OSTEOTOMY SAGITTAL SPLIT WITH MANDIBLE ADVANCEMENT    Date of Surgery/ Procedure: 17  Time of Surgery/ Procedure:   Hospital/Surgical Facility: [facility]  Fax number for surgical facility:   Primary Physician: Lucas Tompkins  Type of Anesthesia Anticipated: General    Patient has a Health Care Directive or Living Will:  yes    Preop Questions 2017   1.  Do you have a history of heart attack, stroke, stent, bypass or surgery on an artery in the head, neck, heart or legs? No   2.  Do you ever have any pain or discomfort in your chest? No   3.  Do you have a history of  Heart Failure? No   4.   Are you troubled by shortness of breath when:  walking on a level surface, or up a slight hill, or at night? No   5.  Do you currently have a cold, bronchitis or other respiratory infection? No   6.  Do you have a cough, shortness of breath, or wheezing? No   7.  Do you sometimes get pains in the calves of your legs when you walk? No   8. Do you or anyone in your family have previous history of blood clots? No   9.  Do you or does anyone in your family have a serious bleeding problem such as prolonged bleeding following surgeries or cuts? No   10. Have you ever had problems with anemia or been told to take iron pills? No   11. Have you had any abnormal blood loss such as black, tarry or bloody stools? No           HPI:                                                      Brief HPI related to upcoming procedure:       See problem list for active medical problems.   Problems all longstanding and stable, except as noted/documented.  See ROS for pertinent symptoms related to these conditions.                                                                                                  .    MEDICAL HISTORY:                                                    Patient Active Problem List    Diagnosis Date Noted     Screening for prostate cancer 07/25/2017     Priority: Medium     Gastroesophageal reflux disease without esophagitis 03/14/2016     Priority: Medium     Back muscle spasm 05/16/2015     Priority: Medium     Anxiety      Priority: Medium     ACP (advance care planning) 08/22/2013     Priority: Medium     Advance Care Planning:   Receipt of ACP document:  Received: Health Care Directive which was witnessed or notarized on 6-9-97.  Document previously scanned on 12-3-07 in [employer]-converted to [employer] on 8/22/13.  Validation form completed and sent to be scanned.  Code Status needs to be updated to reflect patient choices.  Confirmed/documented designated decision maker(s). See permanent comments section of demographics in clinical tab. View document(s) and details by clicking on code status. Added by Tosha Martinez RN, System ACP Coordinator on 8/22/2013.             Hypertension goal BP (blood pressure) < 140/90 08/15/2013     Priority: Medium     Spinal stenosis of cervical region 08/15/2013     Priority: Medium     ADHD (attention deficit hyperactivity disorder)      Priority: Medium     Depression, major, recurrent, in complete remission (H)      Priority: Medium     Insomnia      Priority: Medium     Hyperlipidemia LDL goal <100      Priority: Medium     BPH (benign prostatic hyperplasia)      Priority: Medium      Past Medical History:   Diagnosis Date     ADHD (attention deficit hyperactivity disorder)      Anxiety      Arthritis      BPH (benign prostatic hyperplasia)      Depression, major, recurrent, in complete remission (H)      Hyperlipidemia LDL goal <100       Hypertension      Insomnia      Past Surgical History:   Procedure Laterality Date     APPENDECTOMY       CARPAL TUNNEL RELEASE RT/LT      bilateral     CATARACT IOL, RT/LT Bilateral 2016     EYE SURGERY      bilateral strabismus     HERNIA REPAIR      umbilical     ORTHOPEDIC SURGERY      left knee meniscus     Current Outpatient Prescriptions   Medication Sig Dispense Refill     Multiple Vitamins-Minerals (CENTRUM SILVER) per tablet Take 1 tablet by mouth daily       Glucosamine-Chondroitin (OSTEO BI-FLEX REGULAR STRENGTH) 250-200 MG TABS        zinc 50 MG TABS Take 1 tablet by mouth daily       VITAMIN E NATURAL PO Take 400 Units by mouth daily       VITAMIN D, CHOLECALCIFEROL, PO Take 5,000 Units by mouth daily       magnesium 250 MG tablet Take 1 tablet by mouth daily       atorvastatin (LIPITOR) 80 MG tablet Take 1 tablet (80 mg) by mouth daily 90 tablet 3     lisinopril (PRINIVIL/ZESTRIL) 10 MG tablet Take 1 tablet (10 mg) by mouth daily 90 tablet 3     cyclobenzaprine (FLEXERIL) 10 MG tablet TAKE 1 TABLET BY MOUTH THREE TIMES DAILY AS NEEDED FOR MUSCLE SPASMS. 90 tablet 0     dutasteride (AVODART) 0.5 MG capsule Take 1 capsule (0.5 mg) by mouth daily 90 capsule 3     ranitidine (ZANTAC) 150 MG tablet Take 1 tablet (150 mg) by mouth 2 times daily 180 tablet 3     VYVANSE 70 MG capsule   0     venlafaxine (EFFEXOR XR) 150 MG 24 hr capsule Take 2 capsules by mouth daily       naproxen sodium (ALEVE) 220 MG capsule Take 220 mg by mouth 2 times daily (with meals)       OTC products: None, except as noted above    Allergies   Allergen Reactions     Paxil [Paroxetine Mesylate]       Latex Allergy: NO    Social History   Substance Use Topics     Smoking status: Never Smoker     Smokeless tobacco: Never Used     Alcohol use No     History   Drug Use No       REVIEW OF SYSTEMS:                                                    C: NEGATIVE for fever, chills, change in weight  I: NEGATIVE for worrisome rashes, moles or  "lesions  E: NEGATIVE for vision changes or irritation  E/M: NEGATIVE for ear, mouth and throat problems  R: NEGATIVE for significant cough or SOB  B: NEGATIVE for masses, tenderness or discharge  CV: NEGATIVE for chest pain, palpitations or peripheral edema  GI: NEGATIVE for nausea, abdominal pain, heartburn, or change in bowel habits  : NEGATIVE for frequency, dysuria, or hematuria  M: NEGATIVE for significant arthralgias or myalgia  N: NEGATIVE for weakness, dizziness or paresthesias  E: NEGATIVE for temperature intolerance, skin/hair changes  H: NEGATIVE for bleeding problems  P: NEGATIVE for changes in mood or affect    EXAM:                                                    /80  Pulse 64  Temp 97.7  F (36.5  C) (Tympanic)  Resp 16  Ht 5' 8\" (1.727 m)  Wt 185 lb (83.9 kg)  SpO2 99%  BMI 28.13 kg/m2    GENERAL APPEARANCE: healthy, alert and no distress     EYES: EOMI,  PERRL     HENT: ear canals and TM's normal and nose and mouth without ulcers or lesions. Patient has underbite and full set of braces     NECK: no adenopathy, no asymmetry, masses, or scars and thyroid normal to palpation     RESP: lungs clear to auscultation - no rales, rhonchi or wheezes     CV: regular rates and rhythm, normal S1 S2, no S3 or S4 and no murmur, click or rub     ABDOMEN:  soft, nontender, no HSM or masses and bowel sounds normal     MS: extremities normal- no gross deformities noted, no evidence of inflammation in joints, FROM in all extremities.     SKIN: no suspicious lesions or rashes     NEURO: Normal strength and tone, sensory exam grossly normal, mentation intact and speech normal     PSYCH: mentation appears normal. and affect normal/bright     LYMPHATICS: No axillary, cervical, or supraclavicular nodes    DIAGNOSTICS:                                                    EKG: appears normal, NSR, normal axis, normal intervals, no acute ST/T changes c/w ischemia, no LVH by voltage criteria    Recent Labs   Lab " Test  07/25/17   1026  06/06/16   0844  10/15/15   0840   HGB  14.0   --   13.7   PLT  201   --   196   NA  137  135  136   POTASSIUM  4.2  4.1  4.1   CR  0.78  0.90  1.00        IMPRESSION:                                                    Reason for surgery/procedure: significant problems with severe underbite/malocclusion of the mandible    The proposed surgical procedure is considered INTERMEDIATE risk.    REVISED CARDIAC RISK INDEX  The patient has the following serious cardiovascular risks for perioperative complications such as (MI, PE, VFib and 3  AV Block):  No serious cardiac risks  INTERPRETATION: 0 risks: Class I (very low risk - 0.4% complication rate)    The patient has the following additional risks for perioperative complications:  No identified additional risks      ICD-10-CM    1. Preop general physical exam Z01.818 UA with Microscopic     CBC with platelets differential     Basic metabolic panel     EKG 12-lead complete w/read - Clinics   2. Mandible deviation open/close M26.53    3. Hypertension goal BP (blood pressure) < 140/90 I10    4. Depression, major, recurrent, in complete remission (H) F33.42    5. Hyperlipidemia LDL goal <100 E78.5    6. Attention deficit hyperactivity disorder (ADHD), predominantly inattentive type F90.0        RECOMMENDATIONS:                                                              APPROVAL GIVEN to proceed with proposed procedure, without further diagnostic evaluation       Signed Electronically by: Lucas Tompkins MD    Copy of this evaluation report is provided to requesting physician.    [facility] Preop Guidelines

## 2017-11-29 ENCOUNTER — ANESTHESIA (OUTPATIENT)
Dept: SURGERY | Facility: CLINIC | Age: 67
End: 2017-11-29
Payer: MEDICARE

## 2017-11-29 ENCOUNTER — HOSPITAL ENCOUNTER (OUTPATIENT)
Facility: CLINIC | Age: 67
Setting detail: OBSERVATION
LOS: 1 days | Discharge: HOME | End: 2017-11-30
Attending: DENTIST | Admitting: DENTIST
Payer: MEDICARE

## 2017-11-29 ENCOUNTER — ANESTHESIA EVENT (OUTPATIENT)
Dept: SURGERY | Facility: CLINIC | Age: 67
End: 2017-11-29
Payer: MEDICARE

## 2017-11-29 PROBLEM — M26.04 MANDIBULAR HYPOPLASIA: Status: ACTIVE | Noted: 2017-11-29

## 2017-11-29 LAB — POTASSIUM SERPL-SCNC: 4.1 MMOL/L (ref 3.4–5.3)

## 2017-11-29 PROCEDURE — 25000128 H RX IP 250 OP 636: Performed by: ANESTHESIOLOGY

## 2017-11-29 PROCEDURE — 27210995 ZZH RX 272: Performed by: DENTIST

## 2017-11-29 PROCEDURE — 36415 COLL VENOUS BLD VENIPUNCTURE: CPT | Performed by: ANESTHESIOLOGY

## 2017-11-29 PROCEDURE — 25000132 ZZH RX MED GY IP 250 OP 250 PS 637: Mod: GY | Performed by: DENTIST

## 2017-11-29 PROCEDURE — 25000128 H RX IP 250 OP 636: Performed by: DENTIST

## 2017-11-29 PROCEDURE — G0378 HOSPITAL OBSERVATION PER HR: HCPCS

## 2017-11-29 PROCEDURE — 27210794 ZZH OR GENERAL SUPPLY STERILE: Performed by: DENTIST

## 2017-11-29 PROCEDURE — 25000566 ZZH SEVOFLURANE, EA 15 MIN: Performed by: DENTIST

## 2017-11-29 PROCEDURE — 71000012 ZZH RECOVERY PHASE 1 LEVEL 1 FIRST HR: Performed by: DENTIST

## 2017-11-29 PROCEDURE — 25000125 ZZHC RX 250: Performed by: NURSE ANESTHETIST, CERTIFIED REGISTERED

## 2017-11-29 PROCEDURE — 36000063 ZZH SURGERY LEVEL 4 EA 15 ADDTL MIN: Performed by: DENTIST

## 2017-11-29 PROCEDURE — 37000008 ZZH ANESTHESIA TECHNICAL FEE, 1ST 30 MIN: Performed by: DENTIST

## 2017-11-29 PROCEDURE — 25000128 H RX IP 250 OP 636: Performed by: NURSE ANESTHETIST, CERTIFIED REGISTERED

## 2017-11-29 PROCEDURE — 25000125 ZZHC RX 250: Performed by: DENTIST

## 2017-11-29 PROCEDURE — 71000013 ZZH RECOVERY PHASE 1 LEVEL 1 EA ADDTL HR: Performed by: DENTIST

## 2017-11-29 PROCEDURE — C1713 ANCHOR/SCREW BN/BN,TIS/BN: HCPCS | Performed by: DENTIST

## 2017-11-29 PROCEDURE — 40000170 ZZH STATISTIC PRE-PROCEDURE ASSESSMENT II: Performed by: DENTIST

## 2017-11-29 PROCEDURE — A9270 NON-COVERED ITEM OR SERVICE: HCPCS | Mod: GY | Performed by: DENTIST

## 2017-11-29 PROCEDURE — 36000093 ZZH SURGERY LEVEL 4 1ST 30 MIN: Performed by: DENTIST

## 2017-11-29 PROCEDURE — 84132 ASSAY OF SERUM POTASSIUM: CPT | Performed by: ANESTHESIOLOGY

## 2017-11-29 PROCEDURE — 37000009 ZZH ANESTHESIA TECHNICAL FEE, EACH ADDTL 15 MIN: Performed by: DENTIST

## 2017-11-29 DEVICE — IMP SCR STRK 2.0X12MM 5020412: Type: IMPLANTABLE DEVICE | Site: MANDIBLE | Status: FUNCTIONAL

## 2017-11-29 DEVICE — IMP SCR STRK 2.0X16MM 5020416: Type: IMPLANTABLE DEVICE | Site: MANDIBLE | Status: FUNCTIONAL

## 2017-11-29 DEVICE — WIRE SURGICAL STEEL 24GA 2 DS-24: Type: IMPLANTABLE DEVICE | Site: MANDIBLE | Status: FUNCTIONAL

## 2017-11-29 DEVICE — WIRE SURGICAL STEEL 26GA 0 DS-26: Type: IMPLANTABLE DEVICE | Site: MANDIBLE | Status: FUNCTIONAL

## 2017-11-29 RX ORDER — ALBUTEROL SULFATE 0.83 MG/ML
2.5 SOLUTION RESPIRATORY (INHALATION) EVERY 4 HOURS PRN
Status: DISCONTINUED | OUTPATIENT
Start: 2017-11-29 | End: 2017-11-29 | Stop reason: HOSPADM

## 2017-11-29 RX ORDER — ATORVASTATIN CALCIUM 20 MG/1
80 TABLET, FILM COATED ORAL EVERY EVENING
Status: DISCONTINUED | OUTPATIENT
Start: 2017-11-30 | End: 2017-11-30 | Stop reason: HOSPADM

## 2017-11-29 RX ORDER — DEXAMETHASONE SODIUM PHOSPHATE 4 MG/ML
4 INJECTION, SOLUTION INTRA-ARTICULAR; INTRALESIONAL; INTRAMUSCULAR; INTRAVENOUS; SOFT TISSUE EVERY 8 HOURS
Status: COMPLETED | OUTPATIENT
Start: 2017-11-29 | End: 2017-11-30

## 2017-11-29 RX ORDER — PROPOFOL 10 MG/ML
INJECTION, EMULSION INTRAVENOUS PRN
Status: DISCONTINUED | OUTPATIENT
Start: 2017-11-29 | End: 2017-11-29

## 2017-11-29 RX ORDER — PROPOFOL 10 MG/ML
INJECTION, EMULSION INTRAVENOUS CONTINUOUS PRN
Status: DISCONTINUED | OUTPATIENT
Start: 2017-11-29 | End: 2017-11-29

## 2017-11-29 RX ORDER — ONDANSETRON 2 MG/ML
INJECTION INTRAMUSCULAR; INTRAVENOUS PRN
Status: DISCONTINUED | OUTPATIENT
Start: 2017-11-29 | End: 2017-11-29

## 2017-11-29 RX ORDER — GLYCOPYRROLATE 0.2 MG/ML
INJECTION, SOLUTION INTRAMUSCULAR; INTRAVENOUS PRN
Status: DISCONTINUED | OUTPATIENT
Start: 2017-11-29 | End: 2017-11-29

## 2017-11-29 RX ORDER — ONDANSETRON 4 MG/1
4 TABLET, ORALLY DISINTEGRATING ORAL EVERY 30 MIN PRN
Status: DISCONTINUED | OUTPATIENT
Start: 2017-11-29 | End: 2017-11-29 | Stop reason: HOSPADM

## 2017-11-29 RX ORDER — LISINOPRIL 10 MG/1
10 TABLET ORAL DAILY
Status: DISCONTINUED | OUTPATIENT
Start: 2017-11-30 | End: 2017-11-30 | Stop reason: HOSPADM

## 2017-11-29 RX ORDER — AMOXICILLIN 500 MG/1
500 CAPSULE ORAL EVERY 8 HOURS SCHEDULED
Status: DISCONTINUED | OUTPATIENT
Start: 2017-11-29 | End: 2017-11-30 | Stop reason: HOSPADM

## 2017-11-29 RX ORDER — VECURONIUM BROMIDE 1 MG/ML
INJECTION, POWDER, LYOPHILIZED, FOR SOLUTION INTRAVENOUS PRN
Status: DISCONTINUED | OUTPATIENT
Start: 2017-11-29 | End: 2017-11-29

## 2017-11-29 RX ORDER — NALOXONE HYDROCHLORIDE 0.4 MG/ML
.1-.4 INJECTION, SOLUTION INTRAMUSCULAR; INTRAVENOUS; SUBCUTANEOUS
Status: DISCONTINUED | OUTPATIENT
Start: 2017-11-29 | End: 2017-11-30 | Stop reason: HOSPADM

## 2017-11-29 RX ORDER — CHLORHEXIDINE GLUCONATE ORAL RINSE 1.2 MG/ML
10 SOLUTION DENTAL ONCE
Status: COMPLETED | OUTPATIENT
Start: 2017-11-29 | End: 2017-11-29

## 2017-11-29 RX ORDER — OXYCODONE HYDROCHLORIDE 5 MG/1
5 TABLET ORAL EVERY 4 HOURS PRN
Status: DISCONTINUED | OUTPATIENT
Start: 2017-11-29 | End: 2017-11-30 | Stop reason: HOSPADM

## 2017-11-29 RX ORDER — LORATADINE 10 MG/1
10 TABLET ORAL DAILY PRN
Status: DISCONTINUED | OUTPATIENT
Start: 2017-11-29 | End: 2017-11-30 | Stop reason: HOSPADM

## 2017-11-29 RX ORDER — CEFAZOLIN SODIUM 2 G/100ML
2 INJECTION, SOLUTION INTRAVENOUS
Status: COMPLETED | OUTPATIENT
Start: 2017-11-29 | End: 2017-11-29

## 2017-11-29 RX ORDER — HYDRALAZINE HYDROCHLORIDE 20 MG/ML
2.5-5 INJECTION INTRAMUSCULAR; INTRAVENOUS EVERY 10 MIN PRN
Status: DISCONTINUED | OUTPATIENT
Start: 2017-11-29 | End: 2017-11-29 | Stop reason: HOSPADM

## 2017-11-29 RX ORDER — ACETAMINOPHEN 325 MG/1
650 TABLET ORAL EVERY 6 HOURS PRN
Status: DISCONTINUED | OUTPATIENT
Start: 2017-11-29 | End: 2017-11-30 | Stop reason: HOSPADM

## 2017-11-29 RX ORDER — LIDOCAINE HYDROCHLORIDE AND EPINEPHRINE BITARTRATE 20; .01 MG/ML; MG/ML
INJECTION, SOLUTION SUBCUTANEOUS PRN
Status: DISCONTINUED | OUTPATIENT
Start: 2017-11-29 | End: 2017-11-29 | Stop reason: HOSPADM

## 2017-11-29 RX ORDER — SODIUM CHLORIDE 9 MG/ML
INJECTION, SOLUTION INTRAVENOUS CONTINUOUS
Status: DISCONTINUED | OUTPATIENT
Start: 2017-11-29 | End: 2017-11-30 | Stop reason: HOSPADM

## 2017-11-29 RX ORDER — VENLAFAXINE HYDROCHLORIDE 150 MG/1
150 CAPSULE, EXTENDED RELEASE ORAL 2 TIMES DAILY
Status: DISCONTINUED | OUTPATIENT
Start: 2017-11-29 | End: 2017-11-30 | Stop reason: HOSPADM

## 2017-11-29 RX ORDER — HYDROMORPHONE HYDROCHLORIDE 1 MG/ML
0.2 INJECTION, SOLUTION INTRAMUSCULAR; INTRAVENOUS; SUBCUTANEOUS
Status: DISCONTINUED | OUTPATIENT
Start: 2017-11-29 | End: 2017-11-30

## 2017-11-29 RX ORDER — ONDANSETRON 4 MG/1
4 TABLET, ORALLY DISINTEGRATING ORAL EVERY 6 HOURS PRN
Status: DISCONTINUED | OUTPATIENT
Start: 2017-11-29 | End: 2017-11-30 | Stop reason: HOSPADM

## 2017-11-29 RX ORDER — BENZOCAINE/MENTHOL 6 MG-10 MG
LOZENGE MUCOUS MEMBRANE PRN
Status: DISCONTINUED | OUTPATIENT
Start: 2017-11-29 | End: 2017-11-29 | Stop reason: HOSPADM

## 2017-11-29 RX ORDER — CHLORHEXIDINE GLUCONATE ORAL RINSE 1.2 MG/ML
SOLUTION DENTAL PRN
Status: DISCONTINUED | OUTPATIENT
Start: 2017-11-29 | End: 2017-11-29 | Stop reason: HOSPADM

## 2017-11-29 RX ORDER — ONDANSETRON 2 MG/ML
4 INJECTION INTRAMUSCULAR; INTRAVENOUS EVERY 30 MIN PRN
Status: DISCONTINUED | OUTPATIENT
Start: 2017-11-29 | End: 2017-11-29 | Stop reason: HOSPADM

## 2017-11-29 RX ORDER — KETOROLAC TROMETHAMINE 30 MG/ML
15 INJECTION, SOLUTION INTRAMUSCULAR; INTRAVENOUS
Status: DISCONTINUED | OUTPATIENT
Start: 2017-11-29 | End: 2017-11-29 | Stop reason: HOSPADM

## 2017-11-29 RX ORDER — LABETALOL HYDROCHLORIDE 5 MG/ML
10 INJECTION, SOLUTION INTRAVENOUS
Status: COMPLETED | OUTPATIENT
Start: 2017-11-29 | End: 2017-11-29

## 2017-11-29 RX ORDER — LIDOCAINE HYDROCHLORIDE 20 MG/ML
INJECTION, SOLUTION INFILTRATION; PERINEURAL PRN
Status: DISCONTINUED | OUTPATIENT
Start: 2017-11-29 | End: 2017-11-29

## 2017-11-29 RX ORDER — SODIUM CHLORIDE, SODIUM LACTATE, POTASSIUM CHLORIDE, CALCIUM CHLORIDE 600; 310; 30; 20 MG/100ML; MG/100ML; MG/100ML; MG/100ML
INJECTION, SOLUTION INTRAVENOUS CONTINUOUS
Status: DISCONTINUED | OUTPATIENT
Start: 2017-11-29 | End: 2017-11-29 | Stop reason: HOSPADM

## 2017-11-29 RX ORDER — EPHEDRINE SULFATE 50 MG/ML
INJECTION, SOLUTION INTRAMUSCULAR; INTRAVENOUS; SUBCUTANEOUS PRN
Status: DISCONTINUED | OUTPATIENT
Start: 2017-11-29 | End: 2017-11-29

## 2017-11-29 RX ORDER — NEOSTIGMINE METHYLSULFATE 1 MG/ML
VIAL (ML) INJECTION PRN
Status: DISCONTINUED | OUTPATIENT
Start: 2017-11-29 | End: 2017-11-29

## 2017-11-29 RX ORDER — HYDRALAZINE HYDROCHLORIDE 20 MG/ML
5 INJECTION INTRAMUSCULAR; INTRAVENOUS ONCE
Status: COMPLETED | OUTPATIENT
Start: 2017-11-29 | End: 2017-11-29

## 2017-11-29 RX ORDER — FENTANYL CITRATE 50 UG/ML
25-50 INJECTION, SOLUTION INTRAMUSCULAR; INTRAVENOUS
Status: DISCONTINUED | OUTPATIENT
Start: 2017-11-29 | End: 2017-11-29 | Stop reason: HOSPADM

## 2017-11-29 RX ORDER — PROCHLORPERAZINE MALEATE 5 MG
5 TABLET ORAL EVERY 6 HOURS PRN
Status: DISCONTINUED | OUTPATIENT
Start: 2017-11-29 | End: 2017-11-30 | Stop reason: HOSPADM

## 2017-11-29 RX ORDER — FENTANYL CITRATE 50 UG/ML
INJECTION, SOLUTION INTRAMUSCULAR; INTRAVENOUS PRN
Status: DISCONTINUED | OUTPATIENT
Start: 2017-11-29 | End: 2017-11-29

## 2017-11-29 RX ORDER — CHLORHEXIDINE GLUCONATE ORAL RINSE 1.2 MG/ML
15 SOLUTION DENTAL 2 TIMES DAILY
Status: DISCONTINUED | OUTPATIENT
Start: 2017-11-29 | End: 2017-11-30 | Stop reason: HOSPADM

## 2017-11-29 RX ORDER — LISDEXAMFETAMINE DIMESYLATE 70 MG/1
70 CAPSULE ORAL EVERY MORNING
Status: DISCONTINUED | OUTPATIENT
Start: 2017-11-30 | End: 2017-11-30 | Stop reason: HOSPADM

## 2017-11-29 RX ORDER — ONDANSETRON 2 MG/ML
4 INJECTION INTRAMUSCULAR; INTRAVENOUS EVERY 6 HOURS PRN
Status: DISCONTINUED | OUTPATIENT
Start: 2017-11-29 | End: 2017-11-30 | Stop reason: HOSPADM

## 2017-11-29 RX ORDER — DUTASTERIDE 0.5 MG/1
0.5 CAPSULE, LIQUID FILLED ORAL
Status: DISCONTINUED | OUTPATIENT
Start: 2017-11-29 | End: 2017-11-30 | Stop reason: HOSPADM

## 2017-11-29 RX ORDER — HYDROMORPHONE HYDROCHLORIDE 1 MG/ML
.3-.5 INJECTION, SOLUTION INTRAMUSCULAR; INTRAVENOUS; SUBCUTANEOUS EVERY 5 MIN PRN
Status: DISCONTINUED | OUTPATIENT
Start: 2017-11-29 | End: 2017-11-29 | Stop reason: HOSPADM

## 2017-11-29 RX ORDER — MAGNESIUM HYDROXIDE 1200 MG/15ML
LIQUID ORAL PRN
Status: DISCONTINUED | OUTPATIENT
Start: 2017-11-29 | End: 2017-11-29 | Stop reason: HOSPADM

## 2017-11-29 RX ADMIN — HYDRALAZINE HYDROCHLORIDE 5 MG: 20 INJECTION INTRAMUSCULAR; INTRAVENOUS at 17:39

## 2017-11-29 RX ADMIN — FENTANYL CITRATE 100 MCG: 50 INJECTION, SOLUTION INTRAMUSCULAR; INTRAVENOUS at 13:36

## 2017-11-29 RX ADMIN — LABETALOL HYDROCHLORIDE 5 MG: 5 INJECTION, SOLUTION INTRAVENOUS at 15:48

## 2017-11-29 RX ADMIN — DEXAMETHASONE SODIUM PHOSPHATE 4 MG: 4 INJECTION, SOLUTION INTRAMUSCULAR; INTRAVENOUS at 22:18

## 2017-11-29 RX ADMIN — GLYCOPYRROLATE 0.8 MG: 0.2 INJECTION, SOLUTION INTRAMUSCULAR; INTRAVENOUS at 15:36

## 2017-11-29 RX ADMIN — DEXMEDETOMIDINE HYDROCHLORIDE 8 MCG: 100 INJECTION, SOLUTION INTRAVENOUS at 15:54

## 2017-11-29 RX ADMIN — ONDANSETRON 4 MG: 2 INJECTION INTRAMUSCULAR; INTRAVENOUS at 14:57

## 2017-11-29 RX ADMIN — LABETALOL HYDROCHLORIDE 5 MG: 5 INJECTION, SOLUTION INTRAVENOUS at 15:41

## 2017-11-29 RX ADMIN — LABETALOL HYDROCHLORIDE 5 MG: 5 INJECTION, SOLUTION INTRAVENOUS at 14:26

## 2017-11-29 RX ADMIN — CHLORHEXIDINE GLUCONATE 15 ML: 1.2 RINSE ORAL at 22:18

## 2017-11-29 RX ADMIN — HYDROMORPHONE HYDROCHLORIDE 0.2 MG: 1 INJECTION, SOLUTION INTRAMUSCULAR; INTRAVENOUS; SUBCUTANEOUS at 22:36

## 2017-11-29 RX ADMIN — VENLAFAXINE HYDROCHLORIDE 150 MG: 150 CAPSULE, EXTENDED RELEASE ORAL at 22:18

## 2017-11-29 RX ADMIN — Medication 5 MG: at 14:50

## 2017-11-29 RX ADMIN — Medication 5 MG: at 15:20

## 2017-11-29 RX ADMIN — FENTANYL CITRATE 50 MCG: 50 INJECTION, SOLUTION INTRAMUSCULAR; INTRAVENOUS at 16:56

## 2017-11-29 RX ADMIN — HYDROMORPHONE HYDROCHLORIDE 0.5 MG: 1 INJECTION, SOLUTION INTRAMUSCULAR; INTRAVENOUS; SUBCUTANEOUS at 14:34

## 2017-11-29 RX ADMIN — AMOXICILLIN 500 MG: 500 CAPSULE ORAL at 22:30

## 2017-11-29 RX ADMIN — HYDROMORPHONE HYDROCHLORIDE 0.5 MG: 1 INJECTION, SOLUTION INTRAMUSCULAR; INTRAVENOUS; SUBCUTANEOUS at 13:54

## 2017-11-29 RX ADMIN — PROPOFOL 200 MG: 10 INJECTION, EMULSION INTRAVENOUS at 13:17

## 2017-11-29 RX ADMIN — Medication 15 ML: at 12:25

## 2017-11-29 RX ADMIN — MIDAZOLAM HYDROCHLORIDE 2 MG: 1 INJECTION, SOLUTION INTRAMUSCULAR; INTRAVENOUS at 15:54

## 2017-11-29 RX ADMIN — NEOSTIGMINE METHYLSULFATE 5 MG: 1 INJECTION INTRAMUSCULAR; INTRAVENOUS; SUBCUTANEOUS at 15:36

## 2017-11-29 RX ADMIN — SODIUM CHLORIDE 250 MG: 9 INJECTION, SOLUTION INTRAVENOUS at 13:50

## 2017-11-29 RX ADMIN — VECURONIUM BROMIDE 1 MG: 1 INJECTION, POWDER, LYOPHILIZED, FOR SOLUTION INTRAVENOUS at 14:49

## 2017-11-29 RX ADMIN — SODIUM CHLORIDE, POTASSIUM CHLORIDE, SODIUM LACTATE AND CALCIUM CHLORIDE: 600; 310; 30; 20 INJECTION, SOLUTION INTRAVENOUS at 15:20

## 2017-11-29 RX ADMIN — HYDROMORPHONE HYDROCHLORIDE 0.5 MG: 1 INJECTION, SOLUTION INTRAMUSCULAR; INTRAVENOUS; SUBCUTANEOUS at 17:24

## 2017-11-29 RX ADMIN — ONDANSETRON 4 MG: 2 SOLUTION INTRAMUSCULAR; INTRAVENOUS at 22:37

## 2017-11-29 RX ADMIN — VECURONIUM BROMIDE 6 MG: 1 INJECTION, POWDER, LYOPHILIZED, FOR SOLUTION INTRAVENOUS at 13:17

## 2017-11-29 RX ADMIN — LABETALOL HYDROCHLORIDE 5 MG: 5 INJECTION, SOLUTION INTRAVENOUS at 15:45

## 2017-11-29 RX ADMIN — FENTANYL CITRATE 50 MCG: 50 INJECTION, SOLUTION INTRAMUSCULAR; INTRAVENOUS at 14:23

## 2017-11-29 RX ADMIN — CEFAZOLIN SODIUM 2 G: 2 INJECTION, SOLUTION INTRAVENOUS at 13:42

## 2017-11-29 RX ADMIN — PROPOFOL 200 MCG/KG/MIN: 10 INJECTION, EMULSION INTRAVENOUS at 13:17

## 2017-11-29 RX ADMIN — Medication 5 MG: at 14:56

## 2017-11-29 RX ADMIN — HYDROMORPHONE HYDROCHLORIDE 0.2 MG: 1 INJECTION, SOLUTION INTRAMUSCULAR; INTRAVENOUS; SUBCUTANEOUS at 20:24

## 2017-11-29 RX ADMIN — LIDOCAINE HYDROCHLORIDE 60 MG: 20 INJECTION, SOLUTION INFILTRATION; PERINEURAL at 13:36

## 2017-11-29 RX ADMIN — FENTANYL CITRATE 50 MCG: 50 INJECTION, SOLUTION INTRAMUSCULAR; INTRAVENOUS at 17:05

## 2017-11-29 RX ADMIN — VECURONIUM BROMIDE 1 MG: 1 INJECTION, POWDER, LYOPHILIZED, FOR SOLUTION INTRAVENOUS at 14:08

## 2017-11-29 RX ADMIN — SODIUM CHLORIDE: 9 INJECTION, SOLUTION INTRAVENOUS at 19:51

## 2017-11-29 RX ADMIN — SODIUM CHLORIDE, POTASSIUM CHLORIDE, SODIUM LACTATE AND CALCIUM CHLORIDE: 600; 310; 30; 20 INJECTION, SOLUTION INTRAVENOUS at 12:24

## 2017-11-29 RX ADMIN — MIDAZOLAM HYDROCHLORIDE 1 MG: 1 INJECTION, SOLUTION INTRAMUSCULAR; INTRAVENOUS at 13:08

## 2017-11-29 RX ADMIN — HYDROMORPHONE HYDROCHLORIDE 0.5 MG: 1 INJECTION, SOLUTION INTRAMUSCULAR; INTRAVENOUS; SUBCUTANEOUS at 17:10

## 2017-11-29 ASSESSMENT — COPD QUESTIONNAIRES: COPD: 0

## 2017-11-29 ASSESSMENT — ENCOUNTER SYMPTOMS
DYSRHYTHMIAS: 0
SEIZURES: 0

## 2017-11-29 ASSESSMENT — LIFESTYLE VARIABLES: TOBACCO_USE: 0

## 2017-11-29 NOTE — IP AVS SNAPSHOT
[facility] [facility]    [address] [place] [address]    Phone:  [phone]                                       After Visit Summary   11/29/2017    Stuart Moran    MRN: [id number]           After Visit Summary Signature Page     I have received my discharge instructions, and my questions have been answered. I have discussed any challenges I see with this plan with the nurse or doctor.    ..........................................................................................................................................  Patient/Patient Representative Signature      ..........................................................................................................................................  Patient Representative Print Name and Relationship to Patient    ..................................................               ................................................  Date                                            Time    ..........................................................................................................................................  Reviewed by Signature/Title    ...................................................              ..............................................  Date                                                            Time

## 2017-11-29 NOTE — IP AVS SNAPSHOT
MRN:[id number]                      After Visit Summary   11/29/2017    Stuart Moran    MRN: [id number]           Thank you!     Thank you for choosing [facility] for your care. Our goal is always to provide you with excellent care. Hearing back from our patients is one way we can continue to improve our services. Please take a few minutes to complete the written survey that you may receive in the mail after you visit with us. Thank you!        Patient Information     Date Of Birth          1950        Designated Caregiver       Most Recent Value    Caregiver    Will someone help with your care after discharge? yes    Name of designated caregiver Ingris    Phone number of caregiver [phone]    Caregiver address [address] [place] [address]      About your hospital stay     You were admitted on:  November 29, 2017 You last received care in the:  [facility] [address] [facility]    You were discharged on:  November 30, 2017       Who to Call     For medical emergencies, please call 911.  For non-urgent questions about your medical care, please call your primary care provider or clinic, [phone]  For questions related to your surgery, please call your surgery clinic        Attending Provider     Provider Specialty    Daniel Hyde DDS Oral Surgery    Bakari Norton DDS Oral and Maxillofacial Surgery       Primary Care Provider Office Phone # Fax #    Lucas Tompkins [phone]      After Care Instructions     Discharge Instructions       Swelling occurs after jaw surgery. To minimize keep head elevated at least 30 degrees. Once swelling is present it can remain for 2 weeks.     A small amount of bleeding is expected for up to 24 hours. Excessive bleeding is not normal, please contact clinic should this occur.     Nausea is common after jaw surgery. It may be reduced by taking small amount food with pain medication. Drink large amounts of water when taking  pain medications. Call clinic if repeated vomiting is a problem.      Oral hygiene should be resumed 24 hours after jaw surgery, brushing and rinsing mouth. After jaw surgery you may have a splint wired to your teeth, which will allow plaque accumulation. Therefore, maintaining a clean mouth is essential. Brush gently, taking care not to disturb the wounds. Occasional bleeding and discomfort following tooth brushing is common.     No lifting > 10-15 pounds for 2 weeks following surgery.     No swimming or submerging head/wounds under water until approved following surgery, at least 4 weeks.    Follow up appointment as instructed by MD and or RN.            Discharge Instructions - diet       Soft diet. No chewing for 6 weeks.            Ice to affected area       Ice to operative site, as needed.            NO ALCOHOL        For 24 hours post procedure.            Shower       No shower for 24 hours post procedure. May shower on post-op day  #2.                  Further instructions from your care team       Discharge Instructions following Jaw Surgery  [facility]    Diet:    Follow instructions per the dietician.  Activities:    No contact sports.    No running.    No weight lifting.    If swimming, no head under water.    Solitary, quiet exercise is okay.    No vigorous exercise or swimming should be allowed because of the difficulty in breathing and the strain on your fixation wires (if wired).  To facilitated breathing, a decongestant stray (ex. Afrin Nasal Spray) should be bought at a pharmacy and carried with you for cases of nasal congestion.  This should be used SPARINGLY.  Bathing/Incision Care    It is important to practice good oral hygiene.  This is VERY important to stop the possibility of rapid decay of the teeth and infection.    Post-operative day 2:  o Brush your teeth 6-8 times a day.  A small, child-sized, soft toothbrush can be used on the outside of teeth  to keep the wire and teeth free of debris.  o Don t use Water-Pik until you have checked with your doctor.  o Make sure hooks and power tubes on braces are clean.  o Drink clear liquids after meals and check inside your mouth with your tongue for any debris.  o Do several warm salt-rinses daily (better than mouth rinses that include alcohol in their contents).  o Use smaller amounts of toothpaste.  What to expect:    Swelling following this type of surgery is completely normal.  Generally swelling increases for about 48-72 hours after surgery then begins to decrease gradually.  One half of the swelling will be gone in 7-10 days; however, it is normal for a small amount of swelling to persist for 4-6 weeks.    Bowel habits may change during your fixation period.  Following surgery, it is common to not have a bowel movement for several days.  If this becomes a problem consult your oral surgeon at one of your routine appointments.    Nausea is no cause for alarm.  Remember that even if you should be nauseated and vomit, everything that is in your stomach has been strained through your teeth.  However, at the first sign of persistent, significant nausea, call your oral surgeon and he/she will prescribe anti-nausea medication for you in a suppository form.    Medications which have been prescribed for you are also important.  If an antibiotic has been prescribed, it is very important to continue this preparation as directed until it has all been taken.  Also, a liquid pain medication can be taken as directly only if needed f or discomfort.  Call your surgeon if you have these signs or symptoms:    First sign of persistent, significant nausea    Fever/chills greater than 101 oF.    Pain not relieved with pain medicine.    With any questions or concerns.    Feel free to call the office should any problems develop.  The doctor who is on-call will be able to assist you.  Should an immediate emergency develop, go to the  "nearest hospital emergency room.    Take all medications and follow-up as instructed by your surgeon.        Pending Results     No orders found for last 3 day(s).            Admission Information     Date & Time Provider Department Dept. Phone    11/29/2017 Bakari Norton DDS [facility] [address] [facility] [phone]      Your Vitals Were     Blood Pressure Pulse Temperature Respirations Height Weight    124/51 (BP Location: Right arm) 64 97.8  F (36.6  C) (Oral) 16 1.727 m (5' 8\") 83.9 kg (185 lb)    Pulse Oximetry BMI (Body Mass Index)                98% 28.13 kg/m2          [employer] Information     [employer] gives you secure access to your electronic health record. If you see a primary care provider, you can also send messages to your care team and make appointments. If you have questions, please call your primary care clinic.  If you do not have a primary care provider, please call [phone] and they will assist you.        Care EveryWhere ID     This is your Care EveryWhere ID. This could be used by other organizations to access your [facility] medical records  [id number]        Equal Access to Services     LUIS FELIPE SANTIAGO : Hadii jay jay Johnson, wabrenda montaño, qaybta virgil mena, mihai cullen. So [facility] [phone].    ATENCIÓN: Si habla español, tiene a carl disposición servicios gratuitos de asistencia lingüística. Padmini al [phone].    We comply with applicable federal civil rights laws and [place] laws. We do not discriminate on the basis of race, color, national origin, age, disability, sex, sexual orientation, or gender identity.               Review of your medicines      UNREVIEWED medicines. Ask your doctor about these medicines        Dose / Directions    atorvastatin 80 MG tablet   Commonly known as:  LIPITOR   Used for:  Hyperlipidemia LDL goal <100   Notes to Patient:  Resume as normal        Dose:  80 mg   Take 1 tablet (80 mg) " by mouth daily   Quantity:  90 tablet   Refills:  3       CENTRUM SILVER per tablet   Notes to Patient:  Resume as normal        Dose:  1 tablet   Take 1 tablet by mouth daily   Refills:  0       CLARITIN PO   Notes to Patient:  Resume as normal        Dose:  10 mg   Take 10 mg by mouth every morning   Refills:  0       dutasteride 0.5 MG capsule   Commonly known as:  AVODART   Used for:  Benign prostatic hyperplasia without lower urinary tract symptoms   Notes to Patient:  Resume as normal        Dose:  0.5 mg   Take 1 capsule (0.5 mg) by mouth daily   Quantity:  90 capsule   Refills:  3       EFFEXOR  MG 24 hr capsule   Generic drug:  venlafaxine   Notes to Patient:  Resume as normal        Dose:  150 mg   Take 150 mg by mouth 2 times daily   Refills:  0       FLEXERIL PO   Notes to Patient:  Resume as normal        Dose:  10 mg   Take 10 mg by mouth 3 times daily as needed for muscle spasms   Refills:  0       GENTEAL MILD TO MODERATE 0.3 % Soln ophthalmic solution   Generic drug:  hypromellose   Notes to Patient:  Resume as normal        Dose:  1 drop   Place 1 drop into both eyes 4 times daily as needed for dry eyes   Refills:  0       lisinopril 10 MG tablet   Commonly known as:  PRINIVIL/ZESTRIL   Used for:  Essential hypertension with goal blood pressure less than 140/90   Notes to Patient:  Resume as normal        Dose:  10 mg   Take 1 tablet (10 mg) by mouth daily   Quantity:  90 tablet   Refills:  3       magnesium 250 MG tablet   Notes to Patient:  Resume as normal        Dose:  1 tablet   Take 1 tablet by mouth daily   Refills:  0       MUCINEX PO   Notes to Patient:  Resume as normal        Dose:  600 mg   Take 600 mg by mouth 2 times daily as needed   Refills:  0       NAPROXEN PO   Notes to Patient:  Resume as normal        Dose:  440 mg   Take 440 mg by mouth At Bedtime   Refills:  0       OSTEO BI-FLEX REGULAR STRENGTH 250-200 MG Tabs   Generic drug:  Glucosamine-Chondroitin   Notes to  Patient:  Resume as normal        Dose:  1 tablet   Take 1 tablet by mouth daily   Refills:  0       RANITIDINE HCL PO   Notes to Patient:  Resume as normal        Dose:  150 mg   Take 150 mg by mouth 2 times daily as needed for heartburn   Refills:  0       SYSTANE OP   Notes to Patient:  Resume as normal        Dose:  1-2 drop   Place 1-2 drops into both eyes 4 times daily as needed   Refills:  0       VITAMIN D (CHOLECALCIFEROL) PO   Notes to Patient:  Resume as normal        Dose:  5000 Units   Take 5,000 Units by mouth daily   Refills:  0       VITAMIN E NATURAL PO        Dose:  400 Units   Take 400 Units by mouth daily   Refills:  0       VYVANSE 70 MG capsule   Generic drug:  lisdexamfetamine   Notes to Patient:  Resume as normal        Dose:  70 mg   Take 70 mg by mouth every morning   Refills:  0       zinc 50 MG Tabs   Notes to Patient:  Resume as normal        Dose:  1 tablet   Take 1 tablet by mouth daily   Refills:  0                Protect others around you: Learn how to safely use, store and throw away your medicines at www.disposemymeds.org.             Medication List: This is a list of all your medications and when to take them. Check marks below indicate your daily home schedule. Keep this list as a reference.      Medications           Morning Afternoon Evening Bedtime As Needed    atorvastatin 80 MG tablet   Commonly known as:  LIPITOR   Take 1 tablet (80 mg) by mouth daily   Notes to Patient:  Resume as normal                                CENTRUM SILVER per tablet   Take 1 tablet by mouth daily   Notes to Patient:  Resume as normal                                CLARITIN PO   Take 10 mg by mouth every morning   Notes to Patient:  Resume as normal                                dutasteride 0.5 MG capsule   Commonly known as:  AVODART   Take 1 capsule (0.5 mg) by mouth daily   Notes to Patient:  Resume as normal                                EFFEXOR  MG 24 hr capsule   Take 150 mg by  mouth 2 times daily   Last time this was given:  150 mg on 11/30/2017  9:38 AM   Generic drug:  venlafaxine   Next Dose Due:  Tonight, 11/30/17   Notes to Patient:  Resume as normal                                      FLEXERIL PO   Take 10 mg by mouth 3 times daily as needed for muscle spasms   Notes to Patient:  Resume as normal                                   GENTEAL MILD TO MODERATE 0.3 % Soln ophthalmic solution   Place 1 drop into both eyes 4 times daily as needed for dry eyes   Generic drug:  hypromellose   Notes to Patient:  Resume as normal                                   lisinopril 10 MG tablet   Commonly known as:  PRINIVIL/ZESTRIL   Take 1 tablet (10 mg) by mouth daily   Last time this was given:  10 mg on 11/30/2017  9:38 AM   Next Dose Due:  Tomorrow, 12/1   Notes to Patient:  Resume as normal                                   magnesium 250 MG tablet   Take 1 tablet by mouth daily   Notes to Patient:  Resume as normal                                MUCINEX PO   Take 600 mg by mouth 2 times daily as needed   Notes to Patient:  Resume as normal                                   NAPROXEN PO   Take 440 mg by mouth At Bedtime   Notes to Patient:  Resume as normal                                OSTEO BI-FLEX REGULAR STRENGTH 250-200 MG Tabs   Take 1 tablet by mouth daily   Generic drug:  Glucosamine-Chondroitin   Notes to Patient:  Resume as normal                                RANITIDINE HCL PO   Take 150 mg by mouth 2 times daily as needed for heartburn   Notes to Patient:  Resume as normal                                   SYSTANE OP   Place 1-2 drops into both eyes 4 times daily as needed   Notes to Patient:  Resume as normal                                   VITAMIN D (CHOLECALCIFEROL) PO   Take 5,000 Units by mouth daily   Notes to Patient:  Resume as normal                                VITAMIN E NATURAL PO   Take 400 Units by mouth daily                                VYVANSE 70 MG capsule    Take 70 mg by mouth every morning   Last time this was given:  70 mg on 11/30/2017  9:38 AM   Generic drug:  lisdexamfetamine   Notes to Patient:  Resume as normal                                zinc 50 MG Tabs   Take 1 tablet by mouth daily   Notes to Patient:  Resume as normal

## 2017-11-29 NOTE — H&P
Mr. 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  Diagnoses include mandibular retrognathism with deep bite.  He also has a component of GERD which has severely damaged his maxillary and mandibular teeth secondary to gastric acid.  We are preparing for a mandibular advancement osteotomy with rigid fixation combined with orthodontics and eventual full mouth reconstruction to repair the teeth that have been damaged over the years.      Preoperative history and physical reveals no contraindications to our planned procedure.  We are working with the orthodontist, Dr. Familia Denney, and the , Dr. Lokesh Varela.  Mr. Moran knows that the final orthodontics and final restorative dentistry are the most important part of this sequencing in terms of restoring the teeth.      Mr. Moran understands we will do our very best; however, no guarantees can be made regarding a good result nor freedom from a complication.  Risks discussed include, but are not limited to, hospitalization, anesthesia, swelling discomfort, bleeding, nonunion, malunion, hardware removal, numbness which can be temporary or permanent, loss of taste if lingual nerve is involved, TMJ problems, osteomyelitis, relapse and the extensive post-surgical orthodontics, as well as restorative dentistry.  He also understands that the risk of anesthesia include catastrophic anesthesia events.  As indicated above, no guarantees can be made regarding the final result, but this is our best way with improving the occlusion to a point where it can be restored in terms of eliminating the damage to the maxillary and mandibular teeth secondary to the GERD.  Surgeon will be Daniel Hyde DDS, MD, assisted by Bakari Norton DDS.  We are proceeding this afternoon with the bilateral sagittal split mandibular advancement procedure with rigid fixation and elastic  intermaxillary fixation.  In rare instances, anywhere from 4-6 weeks, even longer, of the intermaxillary fixation with wires is necessary depending on the bony anatomy and the split in the lower jaw.  Informed consent has been reviewed and signed.  Surgical splint has been tried in and found to fit satisfactorily.  All questions have been answered.  Postop appointments have been set up.         FARZANA MCNULTY DDS, MD             D: 2017 12:42   T: 2017 13:23   MT: TS      Name:     DIMAS LOPEZ   MRN:      [id number]        Account:      [id number]   :      1950           Admitted:     [id number]      Document: [id number]

## 2017-11-29 NOTE — ANESTHESIA CARE TRANSFER NOTE
Patient: Stuart Moran    Procedure(s):  (COSMETIC) SAGITTAL SPLIT OSTEOTOMY  - Wound Class: II-Clean Contaminated    Diagnosis: MANDIBULAR HYPOPLASIA   Diagnosis Additional Information: No value filed.    Anesthesia Type:   General     Note:  Airway :Face Mask  Patient transferred to:PACU  Comments: To pacu bed 11. Vss. Report given to RN assuming care of pt      Vitals: (Last set prior to Anesthesia Care Transfer)    CRNA VITALS  11/29/2017 1520 - 11/29/2017 1604      11/29/2017             Pulse: 89    SpO2: 100 %    Resp Rate (observed): 9                Electronically Signed By: SRIKANTH Martínez CRNA  November 29, 2017  4:04 PM

## 2017-11-29 NOTE — BRIEF OP NOTE
[facility] Brief Operative Note    Pre-operative diagnosis: MANDIBULAR HYPOPLASIA    Post-operative diagnosis   Same   Procedure: Procedure(s):  (COSMETIC) SAGITTAL SPLIT OSTEOTOMY  - Wound Class: II-Clean Contaminated   Surgeon(s): Surgeon(s) and Role:     * Daniel Hyde DDS - Primary     * Bakari Norton DDS - Assisting   Estimated blood loss: 150 mL    Specimens: * No specimens in log *   Findings: Bilateral sagittal split ramus osteotomy with rigid internal fixation, occlusion post-op in planned position. Inter-arch elastics placed. See dictated operative report.      Bakari Norton DDS  [facility]  [address], [address]  [place] [address]  Clinic/On-call Phone: [phone]  Clinic Fax: [phone]

## 2017-11-29 NOTE — OR NURSING
Blood pressures remain elevated 170 systoloic, ok per Dr Moe to give 5 mg hydralazine.  Will continue to monitor.

## 2017-11-29 NOTE — ANESTHESIA PREPROCEDURE EVALUATION
Procedure: Procedure(s):  COSMETIC OSTEOTOMY SAGITTAL SPLIT WITH MANDIBLE ADVANCEMENT  Preop diagnosis: MANDIBULAR HYPOPLASIA     Allergies   Allergen Reactions     Paxil [Paroxetine Mesylate]      Past Medical History:   Diagnosis Date     ADHD (attention deficit hyperactivity disorder)      Anxiety      Arthritis      BPH (benign prostatic hyperplasia)      Depression, major, recurrent, in complete remission (H)      Hyperlipidemia LDL goal <100      Hypertension      Insomnia      Past Surgical History:   Procedure Laterality Date     APPENDECTOMY       CARPAL TUNNEL RELEASE RT/LT      bilateral     CATARACT IOL, RT/LT Bilateral 2016     EYE SURGERY      bilateral strabismus     HERNIA REPAIR      umbilical     ORTHOPEDIC SURGERY      left knee meniscus     Prior to Admission medications    Medication Sig Start Date End Date Taking? Authorizing Provider   Cyclobenzaprine HCl (FLEXERIL PO) Take 10 mg by mouth 3 times daily as needed for muscle spasms   Yes Reported, Patient   NAPROXEN PO Take 440 mg by mouth At Bedtime   Yes Reported, Patient   Loratadine (CLARITIN PO) Take 10 mg by mouth every morning   Yes Reported, Patient   hypromellose (GENTEAL MILD TO MODERATE) 0.3 % SOLN ophthalmic solution Place 1 drop into both eyes 4 times daily as needed for dry eyes   Yes Reported, Patient   Polyethyl Glycol-Propyl Glycol (SYSTANE OP) Place 1-2 drops into both eyes 4 times daily as needed   Yes Reported, Patient   Multiple Vitamins-Minerals (CENTRUM SILVER) per tablet Take 1 tablet by mouth daily    Reported, Patient   Glucosamine-Chondroitin (OSTEO BI-FLEX REGULAR STRENGTH) 250-200 MG TABS Take 1 tablet by mouth daily     Reported, Patient   zinc 50 MG TABS Take 1 tablet by mouth daily    Reported, Patient   VITAMIN E NATURAL PO Take 400 Units by mouth daily    Reported, Patient   VITAMIN D, CHOLECALCIFEROL, PO Take 5,000 Units by mouth daily    Reported, Patient   magnesium 250 MG tablet Take 1 tablet by mouth daily     Reported, Patient   atorvastatin (LIPITOR) 80 MG tablet Take 1 tablet (80 mg) by mouth daily 7/25/17   Lucas Tompkins MD   lisinopril (PRINIVIL/ZESTRIL) 10 MG tablet Take 1 tablet (10 mg) by mouth daily 7/25/17   Lucas Tompkins MD   dutasteride (AVODART) 0.5 MG capsule Take 1 capsule (0.5 mg) by mouth daily 7/25/17   Lucas Tompkins MD   ranitidine (ZANTAC) 150 MG tablet Take 1 tablet (150 mg) by mouth 2 times daily 7/25/17   Lucas Tompkins MD   VYVANSE 70 MG capsule Take 70 mg by mouth every morning  7/24/14   Reported, Patient   venlafaxine (EFFEXOR XR) 150 MG 24 hr capsule Take 150 mg by mouth 2 times daily     Reported, Patient     No current Epic-ordered facility-administered medications on file.      Current Outpatient Prescriptions Ordered in Epic   Medication     Cyclobenzaprine HCl (FLEXERIL PO)     NAPROXEN PO     Loratadine (CLARITIN PO)     hypromellose (GENTEAL MILD TO MODERATE) 0.3 % SOLN ophthalmic solution     Polyethyl Glycol-Propyl Glycol (SYSTANE OP)     Multiple Vitamins-Minerals (CENTRUM SILVER) per tablet     Glucosamine-Chondroitin (OSTEO BI-FLEX REGULAR STRENGTH) 250-200 MG TABS     zinc 50 MG TABS     VITAMIN E NATURAL PO     VITAMIN D, CHOLECALCIFEROL, PO     magnesium 250 MG tablet     atorvastatin (LIPITOR) 80 MG tablet     lisinopril (PRINIVIL/ZESTRIL) 10 MG tablet     dutasteride (AVODART) 0.5 MG capsule     ranitidine (ZANTAC) 150 MG tablet     VYVANSE 70 MG capsule     venlafaxine (EFFEXOR XR) 150 MG 24 hr capsule     Wt Readings from Last 1 Encounters:   11/13/17 83.9 kg (185 lb)     Temp Readings from Last 1 Encounters:   11/13/17 36.5  C (97.7  F) (Tympanic)     BP Readings from Last 6 Encounters:   11/13/17 126/80   07/25/17 130/70   06/06/16 136/80   03/14/16 126/70   10/15/15 120/70   08/07/14 122/72     Pulse Readings from Last 4 Encounters:   11/13/17 64   07/25/17 54   06/06/16 78   03/14/16 70     Resp Readings from Last 1 Encounters:    11/13/17 16     SpO2 Readings from Last 1 Encounters:   11/13/17 99%     Recent Labs   Lab Test  11/13/17   1354  07/25/17   1026   NA  136  137   POTASSIUM  4.2  4.2   CHLORIDE  104  104   CO2  21  29   ANIONGAP  11  4   GLC  89  100*   BUN  17  20   CR  0.67  0.78   DALLAS  9.3  8.9     Recent Labs   Lab Test  11/13/17   1354  07/25/17   1026   WBC  5.9  6.0   HGB  13.4  14.0   PLT  210  201     ECG: Rhythm  - occasional PAC, RSR(V1) -nondiagnostic. No st or twave abnormalities.         Anesthesia Evaluation     .             ROS/MED HX    ENT/Pulmonary:      (-) tobacco use, asthma, COPD and sleep apnea   Neurologic:     (+)other neuro hx LBP   (-) seizures, Neuropathy and migraines   Cardiovascular:     (+) Dyslipidemia, hypertension----. : . . . :. .      (-) CAD, CHAVEZ, arrhythmias and valvular problems/murmurs   METS/Exercise Tolerance:  >4 METS   Hematologic:        (-) history of blood clots, anemia and other hematologic disorder   Musculoskeletal:   (+) arthritis, , , -       GI/Hepatic:     (+) GERD Asymptomatic on medication,      (-) liver disease   Renal/Genitourinary:      (-) renal disease   Endo:      (-) Type I DM, Type II DM, thyroid disease and obesity   Psychiatric:     (+) psychiatric history anxiety, depression and other (comment) (ADHD)      Infectious Disease:        (-) Recent Fever   Malignancy:         Other:                     Physical Exam  Normal systems: cardiovascular, pulmonary and dental    Airway   Mallampati: II  TM distance: >3 FB  Neck ROM: limited  Comment: Pain with neck extension - says he may need cervical discectomy    Dental     Cardiovascular   Rhythm and rate: regular and normal      Pulmonary                     Anesthesia Plan      History & Physical Review      ASA Status:  2 .    NPO Status:  > 8 hours    Plan for General and ETT (nasal ETT) with Propofol induction. Maintenance will be Balanced.    PONV prophylaxis:  Ondansetron (or other 5HT-3) and Dexamethasone or  Solumedrol  Additional equipment: Videolaryngoscope Have nasal ray ETT - 7.0 and 7.5 available  Topical lido with nasal trumpets to dilate  Background propofol  Hydromorphone up front      Postoperative Care  Postoperative pain management:  Multi-modal analgesia.      Consents  Anesthetic plan, risks, benefits and alternatives discussed with:  Patient..                          .

## 2017-11-29 NOTE — OR NURSING
Attempted to put face tent and ice to cheek. Patient pushed it off, would not lie still or leave it on.  Patient appears comfortable without at present, will continue to monitor.

## 2017-11-30 VITALS
WEIGHT: 185 LBS | RESPIRATION RATE: 16 BRPM | SYSTOLIC BLOOD PRESSURE: 134 MMHG | HEIGHT: 68 IN | HEART RATE: 64 BPM | BODY MASS INDEX: 28.04 KG/M2 | DIASTOLIC BLOOD PRESSURE: 57 MMHG | OXYGEN SATURATION: 99 % | TEMPERATURE: 97.7 F

## 2017-11-30 PROCEDURE — G0378 HOSPITAL OBSERVATION PER HR: HCPCS

## 2017-11-30 PROCEDURE — 25000125 ZZHC RX 250

## 2017-11-30 PROCEDURE — A9270 NON-COVERED ITEM OR SERVICE: HCPCS | Mod: GY | Performed by: DENTIST

## 2017-11-30 PROCEDURE — 25000128 H RX IP 250 OP 636: Performed by: DENTIST

## 2017-11-30 PROCEDURE — 25000132 ZZH RX MED GY IP 250 OP 250 PS 637: Mod: GY | Performed by: DENTIST

## 2017-11-30 RX ADMIN — LIDOCAINE HYDROCHLORIDE 10 ML: 20 JELLY TOPICAL at 04:39

## 2017-11-30 RX ADMIN — OXYCODONE HYDROCHLORIDE 5 MG: 5 TABLET ORAL at 00:45

## 2017-11-30 RX ADMIN — LISINOPRIL 10 MG: 10 TABLET ORAL at 09:38

## 2017-11-30 RX ADMIN — DEXAMETHASONE SODIUM PHOSPHATE 4 MG: 4 INJECTION, SOLUTION INTRAMUSCULAR; INTRAVENOUS at 14:30

## 2017-11-30 RX ADMIN — AMOXICILLIN 500 MG: 500 CAPSULE ORAL at 05:25

## 2017-11-30 RX ADMIN — AMOXICILLIN 500 MG: 500 CAPSULE ORAL at 14:30

## 2017-11-30 RX ADMIN — VENLAFAXINE HYDROCHLORIDE 150 MG: 150 CAPSULE, EXTENDED RELEASE ORAL at 09:38

## 2017-11-30 RX ADMIN — ACETAMINOPHEN 650 MG: 325 TABLET, FILM COATED ORAL at 02:43

## 2017-11-30 RX ADMIN — CHLORHEXIDINE GLUCONATE 15 ML: 1.2 RINSE ORAL at 09:38

## 2017-11-30 RX ADMIN — ACETAMINOPHEN 650 MG: 325 TABLET, FILM COATED ORAL at 12:22

## 2017-11-30 RX ADMIN — LISDEXAMFETAMINE DIMESYLATE 70 MG: 70 CAPSULE ORAL at 09:38

## 2017-11-30 RX ADMIN — DEXAMETHASONE SODIUM PHOSPHATE 4 MG: 4 INJECTION, SOLUTION INTRAMUSCULAR; INTRAVENOUS at 05:25

## 2017-11-30 NOTE — PLAN OF CARE
Problem: Patient Care Overview  Goal: Plan of Care/Patient Progress Review  Outcome: No Change  A&Ox4. Tolerating clears. Numbness in bottom lip. Jaw bra in place. Pain managed with Dilaudid x2. Zofran x1. Ambulating to BR. Due to void. CMS intact. Bruising and swelling on chin. VSS on 7L face tent. D/C pending.

## 2017-11-30 NOTE — PROGRESS NOTES
Met with patient and wife to give him observation notice with explanation and brochure. Verbalized understanding.

## 2017-11-30 NOTE — CONSULTS
NUTRITION EDUCATION    REASON FOR ASSESSMENT:  Nutrition education on Jaw Surgery Diet    CURRENT DIET:  Clear Liquid Jaw Surgery Diet    NUTRITION HISTORY:  Deferred    NUTRITION DIAGNOSIS:  Food- and nutrition-related knowledge deficit R/t lack of prior exposure to information AEB recent jaw surgery.    INTERVENTIONS:    Nutrition Prescription:  Recommended adequate calories and protein to promote healing, several small meals per day, and use of high protein oral supplements.    Implementation:    Assessed learning needs, learning preferences, and willingness to learn    Nutrition Education  (Content):  a) Provided handout  What to Eat After Jaw Surgery   b) Described diet progression per guidelines listed in handout  c) Discussed importance of small meals    Medical Food Supplements - recommended use of a high protein nutritional supplement    Anticipate good compliance    Diet Education - refer to Education Flowsheet    Goals:    Patient verbalizes understanding of diet     All of the above goals met during the education session    Follow Up:    Provided RD contact information for future questions    Viviana Engle RD  Pager [phone] (M-F)            [phone] ([facility])

## 2017-11-30 NOTE — PLAN OF CARE
Problem: Patient Care Overview  Goal: Plan of Care/Patient Progress Review  Outcome: Improving  VSS. Saline locked. Pain controlled with Tylenol. Urine output around 600 this shift with 300 PVR. Full liquid diet. Tolerating well with no nausea. Most likely discharging this evening.

## 2017-11-30 NOTE — PLAN OF CARE
Problem: Patient Care Overview  Goal: Plan of Care/Patient Progress Review  Outcome: Improving  VSS. Jaw bra, face tent 7L O2. Pain controlled with oxycodone and tylenol. Straight cath'd 675cc at 0500. Tolerating clears.

## 2017-11-30 NOTE — OP NOTE
DATE OF SERVICE:  11/29/2017      PREOPERATIVE DIAGNOSIS:  Mandibular retrognathism with deep bite, gastric acid reflux disease, severe dental wear and attrition.      POSTOPERATIVE DIAGNOSIS:  Mandibular retrognathism with deep bite, gastric acid reflux disease, severe dental wear and attrition.      PROCEDURE:  Bilateral sagittal split mandibular advancement osteotomy with rigid fixation and elastic intermaxillary fixation.       SURGEON:  Daniel Hyde DDS, MD      ASSISTANT:  Bakari Norton DDS      DESCRIPTION OF PROCEDURE:  The patient was taken to the operating room at [facility] on 11/29/17 where general anesthesia was established in a supine position on the operating room table.  Prepping and draping was done in the usual manner for an intraoral orthognathic surgical procedure.  A throat pack was placed.  2% lidocaine with epinephrine was infiltrated into the mandibular posterior ramus tissues for purposes of hemostasis.  The osteotomy was begun on the right, with a bite block on the left.  A #15 blade incision for the sagittal split procedure done in the usual manner, carried down to bone with electrocautery and mandibular exposure.  We verified the position of lingula.  The medial side cut was made with barrel bur and Rosenda bur.  Sagittal plane cut made with reciprocating saw.  Vertical cut made with Rosenda bur.  Mandible was easily split with excellent neurovascular bundle integrity.  J-shaped stripper was used between the segments in the usual manner and then attention directed to the left side, where identical incision, cutdown and splitting of the mandible was performed with again excellent neurovascular bundle integrity.  A Mixter instrument was then used to place circummandibular wires.  With the patient in temporary intermaxillary fixation, the circummandibular wires were spun down with excellent condyle to glenoid fossa positioning.  Rigid fixation was then applied with the  Owner[employer] system utilizing a lag screw technique, two screws on each side with a 16 mm screw anteriorly and a 12 mm screw posteriorly.  Both screws bit well and we were confident in our rigid fixation technique.  The fixation was released and the occlusion found to be as per the model surgery with contact in the anterior and then opened back to the second molar.  The circummandibular wires were removed.  The surgical area was irrigated and suctioned and wounds closed with a running 4-0 Vicryl.  Elastics placed in a vertical fashion in the cuspid region and one molar elastic placed on the left side.      This concluded the procedure without complication.  Throat pack had been removed.  Oral and hypopharynx had been suctioned free of secretions.  The estimated blood loss was 150 mL, none of which was replaced.  There were no complications.         FARZANA MCNULTY DDS, MD             D: 2017 15:48   T: 2017 22:09   MT: [id number]      Name:     DMIAS LOPEZ   MRN:      [id number]        Account:        [id number]   :      1950           Procedure Date: 2017      Document: [id number]

## 2017-11-30 NOTE — DISCHARGE INSTRUCTIONS
Discharge Instructions following Jaw Surgery  [facility]    Diet:    Follow instructions per the dietician.  Activities:    No contact sports.    No running.    No weight lifting.    If swimming, no head under water.    Solitary, quiet exercise is okay.    No vigorous exercise or swimming should be allowed because of the difficulty in breathing and the strain on your fixation wires (if wired).  To facilitated breathing, a decongestant stray (ex. Afrin Nasal Spray) should be bought at a pharmacy and carried with you for cases of nasal congestion.  This should be used SPARINGLY.  Bathing/Incision Care    It is important to practice good oral hygiene.  This is VERY important to stop the possibility of rapid decay of the teeth and infection.    Post-operative day 2:  o Brush your teeth 6-8 times a day.  A small, child-sized, soft toothbrush can be used on the outside of teeth to keep the wire and teeth free of debris.  o Don t use Water-Pik until you have checked with your doctor.  o Make sure hooks and power tubes on braces are clean.  o Drink clear liquids after meals and check inside your mouth with your tongue for any debris.  o Do several warm salt-rinses daily (better than mouth rinses that include alcohol in their contents).  o Use smaller amounts of toothpaste.  What to expect:    Swelling following this type of surgery is completely normal.  Generally swelling increases for about 48-72 hours after surgery then begins to decrease gradually.  One half of the swelling will be gone in 7-10 days; however, it is normal for a small amount of swelling to persist for 4-6 weeks.    Bowel habits may change during your fixation period.  Following surgery, it is common to not have a bowel movement for several days.  If this becomes a problem consult your oral surgeon at one of your routine appointments.    Nausea is no cause for alarm.  Remember that even if you should be nauseated  and vomit, everything that is in your stomach has been strained through your teeth.  However, at the first sign of persistent, significant nausea, call your oral surgeon and he/she will prescribe anti-nausea medication for you in a suppository form.    Medications which have been prescribed for you are also important.  If an antibiotic has been prescribed, it is very important to continue this preparation as directed until it has all been taken.  Also, a liquid pain medication can be taken as directly only if needed f or discomfort.  Call your surgeon if you have these signs or symptoms:    First sign of persistent, significant nausea    Fever/chills greater than 101 oF.    Pain not relieved with pain medicine.    With any questions or concerns.    Feel free to call the office should any problems develop.  The doctor who is on-call will be able to assist you.  Should an immediate emergency develop, go to the nearest hospital emergency room.    Take all medications and follow-up as instructed by your surgeon.

## 2017-11-30 NOTE — PROGRESS NOTES
"[facility] Progress Note  Stuart Moran  [id number]  1950    Today's Date: 11/30/17    S: 68 y/o male POD #1 s/p mandibular bilateral sagittal split ramus osteotomy with rigid internal fixation. Patient ambulating to bathroom; however, minimal void (x2). Straight cath (x1). Pain controlled with dilaudid and oxycodone. Tolerating clear liquid intake without nausea/vomiting. Lower lip hypoesthesia present.     O: Blood pressure 146/67, pulse 64, temperature 98  F (36.7  C), temperature source Axillary, resp. rate 16, height 1.727 m (5' 8\"), weight 83.9 kg (185 lb), SpO2 97 %.  Gen: Comfortable, sitting upright in chair, no distress.  HEENT: EOMI, PERRL.  Bilateral mandibular edema and ecchymosis. No erythema.  Maxillary and mandibular midlines coincident, no appreciable centric position given significant tooth wear. Bilateral posterior occlusion, with tripod anteriorly. Floor of mouth soft, non-tender, non-distended. No intraoral erythema, swelling, or drainage. Wounds hemostatic. Intraoral incisions intact.  Neck: No lymphadenopathy.   CV: Regular rate and rhythm  Resp: Non-labored on room air  Abd: Non-tender  Neuro: V3 hypoesthesia bilaterally    A/P: 68 y/o male with a history of GERD, anxiety, BPH, HTN, ADHD POD #1 s/p mandibular BSSO.     - Continue pain control. Transition to PO for discharge planning.   - Monitor respiration, wean supplemental O2 as able.   - Continue antibiotics, PO amoxicillin  - Peridex rinses BID  - Advance to full liquid diet as tolerated. OK to D/C IV fluids when taking adequate PO. Nutrition consultation.   - Continue to monitor urine output. Home meds (avodart) continued. If continued difficulty voiding, recommend urology consultation.  - Encourage ambulation, SCDs when in bed.  - Continuing patient's home medications.   - Follow-up with Dr. Hyde in 1 week.     Dispo: Home, likely this PM pending patient's progress.    Bakari Norton DDS  [facility]  [address], [address]  [place] [address]  Clinic/On-call Phone: [phone]  Clinic Fax: [phone]

## 2017-12-01 ENCOUNTER — TELEPHONE (OUTPATIENT)
Dept: FAMILY MEDICINE | Facility: CLINIC | Age: 67
End: 2017-12-01

## 2017-12-01 NOTE — TELEPHONE ENCOUNTER
ED / Discharge Outreach Protocol    Patient Contact    Attempt # 1    Was call answered?  No.  Left message on voicemail with information to call triage back.

## 2017-12-01 NOTE — TELEPHONE ENCOUNTER
"Hospital/TCU/ED for chronic condition Discharge Protocol    \"Hi, my name is Corin Lugo, a registered nurse, and I am calling from [facility].  I am calling to follow up and see how things are going for you after your recent emergency visit/hospital/TCU stay.\"    Tell me how you are doing now that you are home?\" Great people there all the round, surgery went smoothly. Spinal stenosis of the neck, when under anesthesia. Very swollen bottom of my face look like a cartoon character.       Discharge Instructions    \"Let's review your discharge instructions.  What is/are the follow-up recommendations?  Pt. Response: to follow up with surgeon, follow up with PCP     \"Has an appointment with your primary care provider been scheduled?\"   patient declined offer to schedule appointment at this time, is going to call back    \"When you see the provider, I would recommend that you bring your medications with you.\"    Medications    \"Tell me what changed about your medicines when you discharged?\"    Changes to chronic meds?    0-1    \"What questions do you have about your medications?\"    None     New diagnoses of heart failure, COPD, diabetes, or MI?    No                  Medication reconciliation completed? Yes  Was MTM referral placed (*Make sure to put transitions as reason for referral)?   No    Call Summary    \"What questions or concerns do you have about your recent visit and your follow-up care?\"     none    \"If you have questions or things don't continue to improve, we encourage you contact us through the main clinic number (give number).  Even if the clinic is not open, triage nurses are available 24/7 to help you.     We would like you to know that our clinic has extended hours (provide information).  We also have urgent care (provide details on closest location and hours/contact info)\"      \"Thank you for your time and take care!\"      "

## 2017-12-01 NOTE — PLAN OF CARE
Problem: Patient Care Overview  Goal: Plan of Care/Patient Progress Review  Outcome: Completed Date Met: 11/30/17  VSS. A&O. Pain control with tylenol PRN. Up indep. Voiding adequately with okay PVRs. Ambulating halls. Mild-moderate swelling on bilat face. Jaw bra in place. Small amt of SS drainage from mouth. Pt and spouse understand oral cares/hygeine. Donaldo full liquid jaw diet. DC instructions, medications and follow up reviewed with pt and wife. Questions answered. DC to home.

## 2018-03-28 ENCOUNTER — OFFICE VISIT (OUTPATIENT)
Dept: FAMILY MEDICINE | Facility: CLINIC | Age: 68
End: 2018-03-28
Payer: COMMERCIAL

## 2018-03-28 VITALS
OXYGEN SATURATION: 98 % | SYSTOLIC BLOOD PRESSURE: 140 MMHG | DIASTOLIC BLOOD PRESSURE: 80 MMHG | HEART RATE: 67 BPM | WEIGHT: 188.5 LBS | HEIGHT: 68 IN | BODY MASS INDEX: 28.57 KG/M2

## 2018-03-28 DIAGNOSIS — M19.041 PRIMARY OSTEOARTHRITIS OF RIGHT HAND: ICD-10-CM

## 2018-03-28 DIAGNOSIS — F33.42 DEPRESSION, MAJOR, RECURRENT, IN COMPLETE REMISSION (H): Chronic | ICD-10-CM

## 2018-03-28 DIAGNOSIS — E78.5 HYPERLIPIDEMIA LDL GOAL <100: Chronic | ICD-10-CM

## 2018-03-28 DIAGNOSIS — K21.9 GASTROESOPHAGEAL REFLUX DISEASE WITHOUT ESOPHAGITIS: ICD-10-CM

## 2018-03-28 DIAGNOSIS — F41.9 ANXIETY: ICD-10-CM

## 2018-03-28 DIAGNOSIS — I10 HYPERTENSION GOAL BP (BLOOD PRESSURE) < 140/90: Chronic | ICD-10-CM

## 2018-03-28 DIAGNOSIS — Z01.818 PREOP GENERAL PHYSICAL EXAM: Primary | ICD-10-CM

## 2018-03-28 DIAGNOSIS — F90.0 ATTENTION DEFICIT HYPERACTIVITY DISORDER (ADHD), PREDOMINANTLY INATTENTIVE TYPE: ICD-10-CM

## 2018-03-28 LAB
ALBUMIN UR-MCNC: NEGATIVE MG/DL
APPEARANCE UR: CLEAR
BASOPHILS # BLD AUTO: 0 10E9/L (ref 0–0.2)
BASOPHILS NFR BLD AUTO: 0.2 %
BILIRUB UR QL STRIP: NEGATIVE
COLOR UR AUTO: YELLOW
DIFFERENTIAL METHOD BLD: NORMAL
EOSINOPHIL # BLD AUTO: 0.1 10E9/L (ref 0–0.7)
EOSINOPHIL NFR BLD AUTO: 2.4 %
ERYTHROCYTE [DISTWIDTH] IN BLOOD BY AUTOMATED COUNT: 13.5 % (ref 10–15)
GLUCOSE UR STRIP-MCNC: NEGATIVE MG/DL
HCT VFR BLD AUTO: 40.5 % (ref 40–53)
HGB BLD-MCNC: 13.7 G/DL (ref 13.3–17.7)
HGB UR QL STRIP: NEGATIVE
KETONES UR STRIP-MCNC: NEGATIVE MG/DL
LEUKOCYTE ESTERASE UR QL STRIP: NEGATIVE
LYMPHOCYTES # BLD AUTO: 0.9 10E9/L (ref 0.8–5.3)
LYMPHOCYTES NFR BLD AUTO: 22.1 %
MCH RBC QN AUTO: 30.4 PG (ref 26.5–33)
MCHC RBC AUTO-ENTMCNC: 33.8 G/DL (ref 31.5–36.5)
MCV RBC AUTO: 90 FL (ref 78–100)
MONOCYTES # BLD AUTO: 0.5 10E9/L (ref 0–1.3)
MONOCYTES NFR BLD AUTO: 10.6 %
NEUTROPHILS # BLD AUTO: 2.8 10E9/L (ref 1.6–8.3)
NEUTROPHILS NFR BLD AUTO: 64.7 %
NITRATE UR QL: NEGATIVE
PH UR STRIP: 6.5 PH (ref 5–7)
PLATELET # BLD AUTO: 182 10E9/L (ref 150–450)
RBC # BLD AUTO: 4.5 10E12/L (ref 4.4–5.9)
RBC #/AREA URNS AUTO: NORMAL /HPF
SOURCE: NORMAL
SP GR UR STRIP: 1.02 (ref 1–1.03)
UROBILINOGEN UR STRIP-ACNC: 0.2 EU/DL (ref 0.2–1)
WBC # BLD AUTO: 4.3 10E9/L (ref 4–11)
WBC #/AREA URNS AUTO: NORMAL /HPF

## 2018-03-28 PROCEDURE — 81001 URINALYSIS AUTO W/SCOPE: CPT | Performed by: FAMILY MEDICINE

## 2018-03-28 PROCEDURE — 99214 OFFICE O/P EST MOD 30 MIN: CPT | Performed by: FAMILY MEDICINE

## 2018-03-28 PROCEDURE — 36415 COLL VENOUS BLD VENIPUNCTURE: CPT | Performed by: FAMILY MEDICINE

## 2018-03-28 PROCEDURE — 85025 COMPLETE CBC W/AUTO DIFF WBC: CPT | Performed by: FAMILY MEDICINE

## 2018-03-28 PROCEDURE — 80048 BASIC METABOLIC PNL TOTAL CA: CPT | Performed by: FAMILY MEDICINE

## 2018-03-28 RX ORDER — DOXEPIN HYDROCHLORIDE 50 MG/1
CAPSULE ORAL
COMMUNITY
Start: 2009-08-20 | End: 2018-08-21

## 2018-03-28 ASSESSMENT — PATIENT HEALTH QUESTIONNAIRE - PHQ9
SUM OF ALL RESPONSES TO PHQ QUESTIONS 1-9: 1
10. IF YOU CHECKED OFF ANY PROBLEMS, HOW DIFFICULT HAVE THESE PROBLEMS MADE IT FOR YOU TO DO YOUR WORK, TAKE CARE OF THINGS AT HOME, OR GET ALONG WITH OTHER PEOPLE: SOMEWHAT DIFFICULT
SUM OF ALL RESPONSES TO PHQ QUESTIONS 1-9: 1

## 2018-03-28 NOTE — NURSING NOTE
"Chief Complaint   Patient presents with     Pre-Op Exam       Initial /80 (BP Location: Left arm, Patient Position: Chair, Cuff Size: Adult Regular)  Pulse 67  Ht 5' 8\" (1.727 m)  Wt 188 lb 8 oz (85.5 kg)  SpO2 98%  BMI 28.66 kg/m2 Estimated body mass index is 28.66 kg/(m^2) as calculated from the following:    Height as of this encounter: 5' 8\" (1.727 m).    Weight as of this encounter: 188 lb 8 oz (85.5 kg).  Medication Reconciliation: complete     Claritza Glez MA     "

## 2018-03-28 NOTE — MR AVS SNAPSHOT
After Visit Summary   3/28/2018    Stuart Moran    MRN: [id number]           Patient Information     Date Of Birth          1950        Visit Information        Provider Department      3/28/2018 10:30 AM Lucas Tompkins MD [facility]        Today's Diagnoses     Preop general physical exam    -  1    Primary osteoarthritis of right hand        Hypertension goal BP (blood pressure) < 140/90        Depression, major, recurrent, in complete remission (H)        Hyperlipidemia LDL goal <100        Attention deficit hyperactivity disorder (ADHD), predominantly inattentive type        Anxiety        Gastroesophageal reflux disease without esophagitis          Care Instructions      Before Your Surgery      Call your surgeon if there is any change in your health. This includes signs of a cold or flu (such as a sore throat, runny nose, cough, rash or fever).    Do not smoke, drink alcohol or take over the counter medicine (unless your surgeon or primary care doctor tells you to) for the 24 hours before and after surgery.    If you take prescribed drugs: Follow your doctor s orders about which medicines to take and which to stop until after surgery.    Eating and drinking prior to surgery: follow the instructions from your surgeon    Take a shower or bath the night before surgery. Use the soap your surgeon gave you to gently clean your skin. If you do not have soap from your surgeon, use your regular soap. Do not shave or scrub the surgery site.  Wear clean pajamas and have clean sheets on your bed.           Follow-ups after your visit        Who to contact     If you have questions or need follow up information about today's clinic visit or your schedule please contact [facility] directly at [phone].  Normal or non-critical lab and imaging results will be communicated to you by MyChart, letter or phone within 4 business days  "after the clinic has received the results. If you do not hear from us within 7 days, please contact the clinic through i[employer] or phone. If you have a critical or abnormal lab result, we will notify you by phone as soon as possible.  Submit refill requests through i[employer] or call your pharmacy and they will forward the refill request to us. Please allow 3 business days for your refill to be completed.          Additional Information About Your Visit        [employer] Information     [employer] gives you secure access to your electronic health record. If you see a primary care provider, you can also send messages to your care team and make appointments. If you have questions, please call your primary care clinic.  If you do not have a primary care provider, please call [phone] and they will assist you.        Care EveryWhere ID     This is your Care EveryWhere ID. This could be used by other organizations to access your [facility] medical records  [id number]        Your Vitals Were     Pulse Height Pulse Oximetry BMI (Body Mass Index)          67 5' 8\" (1.727 m) 98% 28.66 kg/m2         Blood Pressure from Last 3 Encounters:   03/28/18 140/80   11/30/17 134/57   11/13/17 126/80    Weight from Last 3 Encounters:   03/28/18 188 lb 8 oz (85.5 kg)   11/29/17 185 lb (83.9 kg)   11/13/17 185 lb (83.9 kg)              We Performed the Following     Basic metabolic panel     CBC with platelets differential     UA with Microscopic        Primary Care Provider Office Phone # Fax #    Lucas Tompkins [phone] [address] [facility] [address]        Equal Access to Services     [facility]: Hadii aad ku hadasho Soomaali, waaxda luqadaha, qaybta kaalmada adeavinash, mihai cullen. So [facility] [phone].    ATENCIÓN: Si habla español, tiene a carl disposición servicios gratuitos de asistencia lingüística. Llame al [phone].    We comply with applicable federal " civil rights laws and [place] laws. We do not discriminate on the basis of race, color, national origin, age, disability, sex, sexual orientation, or gender identity.            Thank you!     Thank you for choosing [facility]  for your care. Our goal is always to provide you with excellent care. Hearing back from our patients is one way we can continue to improve our services. Please take a few minutes to complete the written survey that you may receive in the mail after your visit with us. Thank you!             Your Updated Medication List - Protect others around you: Learn how to safely use, store and throw away your medicines at www.disposemymeds.org.          This list is accurate as of 3/28/18 11:48 AM.  Always use your most recent med list.                   Brand Name Dispense Instructions for use Diagnosis    atorvastatin 80 MG tablet    LIPITOR    90 tablet    Take 1 tablet (80 mg) by mouth daily    Hyperlipidemia LDL goal <100       CENTRUM SILVER per tablet      Take 1 tablet by mouth daily        CLARITIN PO      Take 10 mg by mouth every morning        doxepin 50 MG capsule    SINEquan          dutasteride 0.5 MG capsule    AVODART    90 capsule    Take 1 capsule (0.5 mg) by mouth daily    Benign prostatic hyperplasia without lower urinary tract symptoms       EFFEXOR  MG 24 hr capsule   Generic drug:  venlafaxine      Take 150 mg by mouth 2 times daily        FLEXERIL PO      Take 10 mg by mouth 3 times daily as needed for muscle spasms        GENTEAL MILD TO MODERATE 0.3 % Soln ophthalmic solution   Generic drug:  hypromellose      Place 1 drop into both eyes 4 times daily as needed for dry eyes        lisinopril 10 MG tablet    PRINIVIL/ZESTRIL    90 tablet    Take 1 tablet (10 mg) by mouth daily    Essential hypertension with goal blood pressure less than 140/90       magnesium 250 MG tablet      Take 1 tablet by mouth daily        MUCINEX PO      Take 600 mg  by mouth 2 times daily as needed        NAPROXEN PO      Take 440 mg by mouth At Bedtime        OSTEO BI-FLEX REGULAR STRENGTH 250-200 MG Tabs   Generic drug:  Glucosamine-Chondroitin      Take 1 tablet by mouth daily        RANITIDINE HCL PO      Take 150 mg by mouth 2 times daily as needed for heartburn        SYSTANE OP      Place 1-2 drops into both eyes 4 times daily as needed        VITAMIN D (CHOLECALCIFEROL) PO      Take 5,000 Units by mouth daily        VITAMIN E NATURAL PO      Take 400 Units by mouth daily        VYVANSE 70 MG capsule   Generic drug:  lisdexamfetamine      Take 70 mg by mouth every morning        zinc 50 MG Tabs      Take 1 tablet by mouth daily

## 2018-03-28 NOTE — PROGRESS NOTES
[facility]  [address] [address]  [phone]  Dept: [phone]    PRE-OP EVALUATION:  Today's date: 3/28/2018    Stuart Moran (: 1950) presents for pre-operative evaluation assessment as requested by Dr. Raman .  He requires evaluation and anesthesia risk assessment prior to undergoing surgery/procedure for treatment of Distal joint fusion right pinkie .    Answers for HPI/ROS submitted by the patient on 3/28/2018   If you checked off any problems, how difficult have these problems made it for you to do your work, take care of things at home, or get along with other people?: Somewhat difficult  PHQ9 TOTAL SCORE: 1    Fax number for surgical facility:   Primary Physician: Lucas Tompkins  Type of Anesthesia Anticipated: to be determined    Patient has a Health Care Directive or Living Will:  YES     Preop Questions 3/28/2018   Who is doing your surgery? vinny raman md   What are you having done? distal joint fusion right arsenio   Date of Surgery/Procedure: 18   Facility or Hospital where procedure/surgery will be performed: [facility]   1.  Do you have a history of Heart attack, stroke, stent, coronary bypass surgery, or other heart surgery? No   2.  Do you ever have any pain or discomfort in your chest? No   3.  Do you have a history of  Heart Failure? No   4.   Are you troubled by shortness of breath when:  walking on a level surface, or up a slight hill, or at night? No   5.  Do you currently have a cold, bronchitis or other respiratory infection? No   6.  Do you have a cough, shortness of breath, or wheezing? No   7.  Do you sometimes get pains in the calves of your legs when you walk? No   8. Do you or anyone in your family have previous history of blood clots? No   9.  Do you or does anyone in your family have a serious bleeding problem such as prolonged bleeding following  surgeries or cuts? No   10. Have you ever had problems with anemia or been told to take iron pills? No   11. Have you had any abnormal blood loss such as black, tarry or bloody stools? No   12. Have you ever had a blood transfusion? No   13. Have you or any of your relatives ever had problems with anesthesia? No   14. Do you have sleep apnea, excessive snoring or daytime drowsiness? No   15. Do you have any prosthetic heart valves? No   16. Do you have prosthetic joints? No         HPI:     HPI related to upcoming procedure: Osteoarthritis of the right 5th DIP joint with increasing pain.      See problem list for active medical problems.  Problems all longstanding and stable, except as noted/documented.  See ROS for pertinent symptoms related to these conditions.                                                                                                  .    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  Document previously scanned on 12-3-07 in [employer]-converted to [employer] on 8/22/13.  Validation form completed and sent to be scanned.  Code Status needs to be updated to reflect patient choices.  Confirmed/documented designated decision maker(s). See permanent comments section of demographics in clinical tab. View document(s) and details by clicking on code status. Added by Tosha Martinez RN, System ACP Coordinator on 8/22/2013.             Hypertension goal BP (blood pressure) < 140/90 08/15/2013      Priority: Medium     Spinal stenosis of cervical region 08/15/2013     Priority: Medium     ADHD (attention deficit hyperactivity disorder)      Priority: Medium     Depression, major, recurrent, in complete remission (H)      Priority: Medium     Insomnia      Priority: Medium     Hyperlipidemia LDL goal <100      Priority: Medium     BPH (benign prostatic hyperplasia)      Priority: Medium      Past Medical History:   Diagnosis Date     ADHD (attention deficit hyperactivity disorder)      Anxiety      Arthritis      BPH (benign prostatic hyperplasia)      Depression, major, recurrent, in complete remission (H)      Gastroesophageal reflux disease      Hyperlipidemia LDL goal <100      Hypertension      Insomnia      Past Surgical History:   Procedure Laterality Date     APPENDECTOMY       CARPAL TUNNEL RELEASE RT/LT      bilateral     CATARACT IOL, RT/LT Bilateral 2016     COSMETIC OSTEOTOMY SAGITTAL SPLIT WITH MANDIBLE ADVANCEMENT N/A 11/29/2017    Procedure: COSMETIC OSTEOTOMY SAGITTAL SPLIT WITH MANDIBLE ADVANCEMENT;  (COSMETIC) SAGITTAL SPLIT OSTEOTOMY ;  Surgeon: Daniel Hyde DDS;  Location: [facility] OR     EYE SURGERY      bilateral strabismus     HERNIA REPAIR      umbilical     ORTHOPEDIC SURGERY      left knee meniscus     Current Outpatient Prescriptions   Medication Sig Dispense Refill     doxepin (SINEQUAN) 50 MG capsule        RANITIDINE HCL PO Take 150 mg by mouth 2 times daily as needed for heartburn       GuaiFENesin (MUCINEX PO) Take 600 mg by mouth 2 times daily as needed       Cyclobenzaprine HCl (FLEXERIL PO) Take 10 mg by mouth 3 times daily as needed for muscle spasms       NAPROXEN PO Take 440 mg by mouth At Bedtime       Loratadine (CLARITIN PO) Take 10 mg by mouth every morning       hypromellose (GENTEAL MILD TO MODERATE) 0.3 % SOLN ophthalmic solution Place 1 drop into both eyes 4 times daily as needed for dry eyes       Polyethyl Glycol-Propyl Glycol (SYSTANE OP) Place 1-2 drops into both  eyes 4 times daily as needed       Multiple Vitamins-Minerals (CENTRUM SILVER) per tablet Take 1 tablet by mouth daily       Glucosamine-Chondroitin (OSTEO BI-FLEX REGULAR STRENGTH) 250-200 MG TABS Take 1 tablet by mouth daily        zinc 50 MG TABS Take 1 tablet by mouth daily       VITAMIN E NATURAL PO Take 400 Units by mouth daily       VITAMIN D, CHOLECALCIFEROL, PO Take 5,000 Units by mouth daily       magnesium 250 MG tablet Take 1 tablet by mouth daily       atorvastatin (LIPITOR) 80 MG tablet Take 1 tablet (80 mg) by mouth daily 90 tablet 3     lisinopril (PRINIVIL/ZESTRIL) 10 MG tablet Take 1 tablet (10 mg) by mouth daily 90 tablet 3     dutasteride (AVODART) 0.5 MG capsule Take 1 capsule (0.5 mg) by mouth daily 90 capsule 3     VYVANSE 70 MG capsule Take 70 mg by mouth every morning   0     venlafaxine (EFFEXOR XR) 150 MG 24 hr capsule Take 150 mg by mouth 2 times daily        OTC products: None, except as noted above    Allergies   Allergen Reactions     Amoxicillin Hives     Paxil [Paroxetine Mesylate] Other (See Comments)     Shivers, tingling up and down spine      Latex Allergy: NO    Social History   Substance Use Topics     Smoking status: Never Smoker     Smokeless tobacco: Never Used     Alcohol use No     History   Drug Use No       REVIEW OF SYSTEMS:   CONSTITUTIONAL: NEGATIVE for fever, chills, change in weight  INTEGUMENTARY/SKIN: NEGATIVE for worrisome rashes, moles or lesions  EYES: NEGATIVE for vision changes or irritation  ENT/MOUTH: NEGATIVE for ear, mouth and throat problems  RESP: NEGATIVE for significant cough or SOB  BREAST: NEGATIVE for masses, tenderness or discharge  CV: NEGATIVE for chest pain, palpitations or peripheral edema  GI: NEGATIVE for nausea, abdominal pain, heartburn, or change in bowel habits  : NEGATIVE for frequency, dysuria, or hematuria  MUSCULOSKELETAL:POSITIVE  for arthralgias right 5th finger DIP joint  NEURO: NEGATIVE for weakness, dizziness or  "paresthesias  ENDOCRINE: NEGATIVE for temperature intolerance, skin/hair changes  HEME: NEGATIVE for bleeding problems  PSYCHIATRIC: NEGATIVE for changes in mood or affect    EXAM:   /80 (BP Location: Left arm, Patient Position: Chair, Cuff Size: Adult Regular)  Pulse 67  Ht 5' 8\" (1.727 m)  Wt 188 lb 8 oz (85.5 kg)  SpO2 98%  BMI 28.66 kg/m2    GENERAL APPEARANCE: healthy, alert and no distress     EYES: EOMI,  PERRL     HENT: ear canals and TM's normal and nose and mouth without ulcers or lesions     NECK: no adenopathy, no asymmetry, masses, or scars and thyroid normal to palpation     RESP: lungs clear to auscultation - no rales, rhonchi or wheezes     CV: regular rates and rhythm, normal S1 S2, no S3 or S4 and no murmur, click or rub     ABDOMEN:  soft, nontender, no HSM or masses and bowel sounds normal     MS: extremities normal- no gross deformities noted, no evidence of inflammation in joints, FROM in all extremities.     SKIN: no suspicious lesions or rashes     NEURO: Normal strength and tone, sensory exam grossly normal, mentation intact and speech normal     PSYCH: mentation appears normal. and affect normal/bright     LYMPHATICS: No cervical adenopathy    DIAGNOSTICS:       Recent Labs   Lab Test  11/29/17   1145  11/13/17   1354  07/25/17   1026   HGB   --   13.4  14.0   PLT   --   210  201   NA   --   136  137   POTASSIUM  4.1  4.2  4.2   CR   --   0.67  0.78        IMPRESSION:   Reason for surgery/procedure: increasing pain in the right 5th DIP joint    The proposed surgical procedure is considered LOW risk.    REVISED CARDIAC RISK INDEX  The patient has the following serious cardiovascular risks for perioperative complications such as (MI, PE, VFib and 3  AV Block):  No serious cardiac risks  INTERPRETATION: 0 risks: Class I (very low risk - 0.4% complication rate)    The patient has the following additional risks for perioperative complications:  No identified additional risks      " ICD-10-CM    1. Need for prophylactic vaccination against Streptococcus pneumoniae (pneumococcus) Z23    2. Preop general physical exam Z01.818        RECOMMENDATIONS:             APPROVAL GIVEN to proceed with proposed procedure, without further diagnostic evaluation       Signed Electronically by: Lucas Tompkins MD    Copy of this evaluation report is provided to requesting physician.    [facility] Preop Guidelines

## 2018-03-29 LAB
ANION GAP SERPL CALCULATED.3IONS-SCNC: 7 MMOL/L (ref 3–14)
BUN SERPL-MCNC: 15 MG/DL (ref 7–30)
CALCIUM SERPL-MCNC: 8.9 MG/DL (ref 8.5–10.1)
CHLORIDE SERPL-SCNC: 103 MMOL/L (ref 94–109)
CO2 SERPL-SCNC: 27 MMOL/L (ref 20–32)
CREAT SERPL-MCNC: 0.71 MG/DL (ref 0.66–1.25)
GFR SERPL CREATININE-BSD FRML MDRD: >90 ML/MIN/1.7M2
GLUCOSE SERPL-MCNC: 90 MG/DL (ref 70–99)
POTASSIUM SERPL-SCNC: 4.2 MMOL/L (ref 3.4–5.3)
SODIUM SERPL-SCNC: 137 MMOL/L (ref 133–144)

## 2018-03-29 ASSESSMENT — PATIENT HEALTH QUESTIONNAIRE - PHQ9: SUM OF ALL RESPONSES TO PHQ QUESTIONS 1-9: 1

## 2018-04-02 ENCOUNTER — MYC MEDICAL ADVICE (OUTPATIENT)
Dept: FAMILY MEDICINE | Facility: CLINIC | Age: 68
End: 2018-04-02

## 2018-04-16 ENCOUNTER — TRANSFERRED RECORDS (OUTPATIENT)
Dept: HEALTH INFORMATION MANAGEMENT | Facility: CLINIC | Age: 68
End: 2018-04-16

## 2018-05-23 ENCOUNTER — MYC MEDICAL ADVICE (OUTPATIENT)
Dept: FAMILY MEDICINE | Facility: CLINIC | Age: 68
End: 2018-05-23

## 2018-05-23 DIAGNOSIS — F90.0 ATTENTION DEFICIT HYPERACTIVITY DISORDER (ADHD), PREDOMINANTLY INATTENTIVE TYPE: ICD-10-CM

## 2018-05-23 DIAGNOSIS — F90.9 ATTENTION DEFICIT HYPERACTIVITY DISORDER: Primary | ICD-10-CM

## 2018-05-23 DIAGNOSIS — F33.42 MAJOR DEPRESSIVE DISORDER, RECURRENT EPISODE, IN FULL REMISSION (H): ICD-10-CM

## 2018-05-23 RX ORDER — LISDEXAMFETAMINE DIMESYLATE 70 MG/1
70 CAPSULE ORAL EVERY MORNING
Qty: 90 CAPSULE | Refills: 0 | Status: SHIPPED | OUTPATIENT
Start: 2018-05-23 | End: 2018-10-08

## 2018-05-23 RX ORDER — VENLAFAXINE HYDROCHLORIDE 150 MG/1
150 CAPSULE, EXTENDED RELEASE ORAL 2 TIMES DAILY
Qty: 180 CAPSULE | Refills: 1 | Status: SHIPPED | OUTPATIENT
Start: 2018-05-23 | End: 2018-11-19

## 2018-05-23 NOTE — TELEPHONE ENCOUNTER
Sam, Dr. Tompkins,    We discussed, at my last physical, your taking over, from Dr. Durán, the prescribing of my medications.  Dr. Durán has been the prescriber for 20 years!   He has chosen, again, to move his practice, and the drive is getting to be just too much.    I now need to fill:    - Venlafaxine ER 150mg Capsules, Qty 180    - Vyvanse 70mg Capsules, Qty 90    I do not need to fill, at this time:    - Doxepin 25mg Capsules, Qty 180    The pharmacy is the same: [facility] on [address] [facility] in [place].    Thank you for taking over this prescribing,    Stuart Moran                        Routing refill request to provider for review/approval because:  Medication is reported/historical--Effexor  BID  _________________________________________________________________________  Controlled Substance Refill Request for Vyvanse --historical med also  Problem List Complete:  No     PROVIDER TO CONSIDER COMPLETION OF PROBLEM LIST AND OVERVIEW/CONTROLLED SUBSTANCE AGREEMENT    Last Written Prescription Date:  1-23-18  Last Fill Quantity: 90,   # refills: 0 per Dr Leos    Last Office Visit with [facility] primary care provider: 3-28-18    Future Office visit:     Controlled substance agreement on file: No.     Processing:  Patient will  in clinic   checked in past 6 months?  Yes 5-23-18 no concerns for the Vyvanse

## 2018-05-24 NOTE — TELEPHONE ENCOUNTER
Left message on voice mail that script was ready for  at our [facility] location.    Sintia Pelayo LPN

## 2018-05-26 ENCOUNTER — TELEPHONE (OUTPATIENT)
Dept: FAMILY MEDICINE | Facility: CLINIC | Age: 68
End: 2018-05-26

## 2018-05-26 NOTE — TELEPHONE ENCOUNTER
Prior Authorization Retail Medication Request    Medication/Dose: lisdexamfetamine (VYVANSE) 70 MG capsule  ICD code (if different than what is on RX):    Previously Tried and Failed:    Rationale:      Insurance Name:    Insurance ID:  [id number]      Pharmacy Information (if different than what is on RX)  Name:    Phone:        MESSAGE: PLAN DOES NOT COVER THIS MEDICATION. PLEASE CALL PLAN [phone] TO INITIATE PRIOR AUTHORIZATION OR CALL/ FAX PHARMACY TO CHANGE MEDICATION.

## 2018-05-29 NOTE — TELEPHONE ENCOUNTER
PA initiated on [email or web address] but could not be completed because the plan is not able to process the request through [facility]. PA completed via phone.

## 2018-05-29 NOTE — TELEPHONE ENCOUNTER
PA Initiation    Medication: lisdexamfetamine (VYVANSE) 70 MG capsule   Insurance Company: [email or web address] - Phone [phone] Fax [phone]  Pharmacy Filling the Rx: [facility] [address] [place] [address] [place], [place] - [address] AT [facility] [place] [address]  Filling Pharmacy Phone: [phone]  Filling Pharmacy Fax:    Start Date: 5/29/2018

## 2018-05-30 ENCOUNTER — TRANSFERRED RECORDS (OUTPATIENT)
Dept: HEALTH INFORMATION MANAGEMENT | Facility: CLINIC | Age: 68
End: 2018-05-30

## 2018-05-30 NOTE — TELEPHONE ENCOUNTER
Prior Authorization Approval    Authorization Effective Date: 2/28/2018  Authorization Expiration Date: 5/29/2019  Medication: lisdexamfetamine (VYVANSE) 70 MG capsule - APPROVED   Approved Dose/Quantity:   Reference #:     Insurance Company: [employer] - Phone [phone] Fax [phone]  Expected CoPay:       CoPay Card Available:      Foundation Assistance Needed:    Which Pharmacy is filling the prescription (Not needed for infusion/clinic administered): [facility] [address] [place] [address] [place], [place] - [address] AT [facility] [place] [address]  Pharmacy Notified: Yes  Patient Notified: Yes

## 2018-08-21 ENCOUNTER — E-VISIT (OUTPATIENT)
Dept: FAMILY MEDICINE | Facility: CLINIC | Age: 68
End: 2018-08-21
Payer: COMMERCIAL

## 2018-08-21 DIAGNOSIS — F51.01 PRIMARY INSOMNIA: Primary | ICD-10-CM

## 2018-08-21 PROCEDURE — 99444 ZZC PHYSICIAN ONLINE EVALUATION & MANAGEMENT SERVICE: CPT | Performed by: FAMILY MEDICINE

## 2018-08-21 RX ORDER — DOXEPIN HYDROCHLORIDE 25 MG/1
75 CAPSULE ORAL AT BEDTIME
Qty: 270 CAPSULE | Refills: 1 | Status: SHIPPED | OUTPATIENT
Start: 2018-08-21 | End: 2019-03-12

## 2018-08-23 ENCOUNTER — TELEPHONE (OUTPATIENT)
Dept: FAMILY MEDICINE | Facility: CLINIC | Age: 68
End: 2018-08-23

## 2018-08-23 NOTE — TELEPHONE ENCOUNTER
Prior Authorization Retail Medication Request    Medication/Dose: cyclobenzaprine HCI 10 mg  ICD code (if different than what is on RX):    Previously Tried and Failed:    Rationale:      Insurance Name:    Insurance ID:        Pharmacy Information (if different than what is on RX)  Name:    Phone:      PA started on CMM. Key: [id number]

## 2018-08-23 NOTE — TELEPHONE ENCOUNTER
PA Initiation    Medication: cyclobenzaprine HCI 10 mg -   Insurance Company: [employer] - Phone [phone] Fax [phone]  Pharmacy Filling the Rx: [facility] - [place], [place] - [address] AT [facility] [place] [address]  Filling Pharmacy Phone: [phone]  Filling Pharmacy Fax: [phone]  Start Date: 8/23/2018

## 2018-08-24 NOTE — TELEPHONE ENCOUNTER
Prior Authorization Approval    Authorization Effective Date: 5/26/2018  Authorization Expiration Date: 8/24/2019  Medication: cyclobenzaprine HCI 10 mg - APPROVED  Approved Dose/Quantity:    Reference #:     Insurance Company: [employer] - Phone [phone] Fax [phone]  Expected CoPay:       CoPay Card Available:      Foundation Assistance Needed:    Which Pharmacy is filling the prescription (Not needed for infusion/clinic administered): [facility] - [place], [place] - [address] AT [facility] [place] [address]  Pharmacy Notified: Yes  Patient Notified: Yes

## 2018-10-01 ENCOUNTER — MYC REFILL (OUTPATIENT)
Dept: FAMILY MEDICINE | Facility: CLINIC | Age: 68
End: 2018-10-01

## 2018-10-01 DIAGNOSIS — F90.0 ATTENTION DEFICIT HYPERACTIVITY DISORDER (ADHD), PREDOMINANTLY INATTENTIVE TYPE: ICD-10-CM

## 2018-10-01 RX ORDER — LISDEXAMFETAMINE DIMESYLATE 70 MG/1
70 CAPSULE ORAL EVERY MORNING
Qty: 90 CAPSULE | Refills: 0 | Status: CANCELLED | OUTPATIENT
Start: 2018-10-01

## 2018-10-01 NOTE — TELEPHONE ENCOUNTER
lisdexamfetamine (VYVANSE) 70 MG capsule    Last Written Prescription Date: 05/23/2018  Last Fill Quantity: 90,   # refills: 0  Last Office Visit: 03/28/2018  Future Office visit:       Routing refill request to provider for review/approval because:  Drug not on the [facility], [facility] or [facility] refill protocol or controlled substance

## 2018-10-01 NOTE — TELEPHONE ENCOUNTER
Message from [employer]:  Original authorizing provider: MD Stuart Simon would like a refill of the following medications:  lisdexamfetamine (VYVANSE) 70 MG capsule [Lucas Tompkins MD]    Preferred pharmacy: [facility] [address] [place] [address] [address] AT [facility] & [facility] [address]    Comment:  Dr. Tompkins, Please write a new refill prescription for: Vyvanse 70mg quantity 90 last filled 2018.05.30 I take one every morning. Thank you, Stuart Moran

## 2018-10-03 NOTE — TELEPHONE ENCOUNTER
Controlled substance agreement on file: No.      Processing:  Patient will  in clinic   checked in past 3 months?  Yes 10/3/18 Provider to review

## 2018-10-08 RX ORDER — LISDEXAMFETAMINE DIMESYLATE 70 MG/1
70 CAPSULE ORAL EVERY MORNING
Qty: 90 CAPSULE | Refills: 0 | Status: SHIPPED | OUTPATIENT
Start: 2018-10-08 | End: 2019-01-29

## 2018-10-08 NOTE — TELEPHONE ENCOUNTER
Patient was notified that script was ready for  at our [facility] location.    Sintia Pelayo LPN

## 2018-11-19 DIAGNOSIS — E78.5 HYPERLIPIDEMIA LDL GOAL <100: Chronic | ICD-10-CM

## 2018-11-19 DIAGNOSIS — F33.42 MAJOR DEPRESSIVE DISORDER, RECURRENT EPISODE, IN FULL REMISSION (H): ICD-10-CM

## 2018-11-20 RX ORDER — VENLAFAXINE HYDROCHLORIDE 150 MG/1
CAPSULE, EXTENDED RELEASE ORAL
Qty: 180 CAPSULE | Refills: 0 | Status: SHIPPED | OUTPATIENT
Start: 2018-11-20 | End: 2019-03-11

## 2018-11-20 RX ORDER — ATORVASTATIN CALCIUM 80 MG/1
TABLET, FILM COATED ORAL
Qty: 90 TABLET | Refills: 0 | Status: SHIPPED | OUTPATIENT
Start: 2018-11-20 | End: 2019-05-06

## 2018-11-20 NOTE — TELEPHONE ENCOUNTER
"Requested Prescriptions   Pending Prescriptions Disp Refills     atorvastatin (LIPITOR) 80 MG tablet [Pharmacy Med Name: ATORVASTATIN 80MG TABLETS]  Last Written Prescription Date:  08/22/2018  Last Fill Quantity: 90,  # refills: 0   Last office visit: 3/28/2018 with prescribing provider:  CANDACE   Future Office Visit:     90 tablet 0     Sig: TAKE 1 TABLET BY MOUTH EVERY DAY    Statins Protocol Failed    11/19/2018  7:24 PM       Failed - LDL on file in past 12 months    Recent Labs   Lab Test  07/25/17   1026   LDL  71            Passed - No abnormal creatine kinase in past 12 months    No lab results found.            Passed - Recent (12 mo) or future (30 days) visit within the authorizing provider's specialty    Patient had office visit in the last 12 months or has a visit in the next 30 days with authorizing provider or within the authorizing provider's specialty.  See \"Patient Info\" tab in inbasket, or \"Choose Columns\" in Meds & Orders section of the refill encounter.             Passed - Patient is age 18 or older        venlafaxine (EFFEXOR-XR) 150 MG 24 hr capsule [Pharmacy Med Name: VENLAFAXINE ER 150MG CAPSULES]  Last Written Prescription Date:  05/23/2018  Last Fill Quantity: 180,  # refills: 1   Last office visit: 3/28/2018 with prescribing provider:  CANDACE   Future Office Visit:     180 capsule 0     Sig: TAKE 1 CAPSULE BY MOUTH TWICE DAILY    Serotonin-Norepinephrine Reuptake Inhibitors  Failed    11/19/2018  7:24 PM       Failed - Blood pressure under 140/90 in past 12 months    BP Readings from Last 3 Encounters:   03/28/18 140/80   11/30/17 134/57   11/13/17 126/80                Failed - PHQ-9 score of less than 5 in past 6 months    Please review last PHQ-9 score.          Failed - Recent (6 mo) or future (30 days) visit within the authorizing provider's specialty    Patient had office visit in the last 6 months or has a visit in the next 30 days with authorizing provider or within the " "authorizing provider's specialty.  See \"Patient Info\" tab in inbasket, or \"Choose Columns\" in Meds & Orders section of the refill encounter.           Passed - Patient is age 18 or older       Passed - Normal serum creatinine on file in past 12 months    Recent Labs   Lab Test  03/28/18   1155   CR  0.71               "

## 2019-03-11 DIAGNOSIS — N40.0 BENIGN PROSTATIC HYPERPLASIA WITHOUT LOWER URINARY TRACT SYMPTOMS: ICD-10-CM

## 2019-03-11 DIAGNOSIS — F33.42 MAJOR DEPRESSIVE DISORDER, RECURRENT EPISODE, IN FULL REMISSION (H): ICD-10-CM

## 2019-03-11 DIAGNOSIS — F51.01 PRIMARY INSOMNIA: ICD-10-CM

## 2019-03-11 DIAGNOSIS — I10 ESSENTIAL HYPERTENSION WITH GOAL BLOOD PRESSURE LESS THAN 140/90: Chronic | ICD-10-CM

## 2019-03-11 NOTE — TELEPHONE ENCOUNTER
"Requested Prescriptions   Pending Prescriptions Disp Refills     lisinopril (PRINIVIL/ZESTRIL) 10 MG tablet  Last Written Prescription Date:  8/22/2018  Last Fill Quantity: 90 tablet,  # refills: 1   Last office visit: 3/28/2018 with prescribing provider:  Leda   Future Office Visit:     90 tablet 1     Sig: Take 1 tablet (10 mg) by mouth daily    ACE Inhibitors (Including Combos) Protocol Failed - 3/11/2019  1:11 PM       Failed - Blood pressure under 140/90 in past 12 months    BP Readings from Last 3 Encounters:   03/28/18 140/80   11/30/17 134/57   11/13/17 126/80                Passed - Recent (12 mo) or future (30 days) visit within the authorizing provider's specialty    Patient had office visit in the last 12 months or has a visit in the next 30 days with authorizing provider or within the authorizing provider's specialty.  See \"Patient Info\" tab in inbasket, or \"Choose Columns\" in Meds & Orders section of the refill encounter.             Passed - Medication is active on med list       Passed - Patient is age 18 or older       Passed - Normal serum creatinine on file in past 12 months    Recent Labs   Lab Test 03/28/18  1155   CR 0.71            Passed - Normal serum potassium on file in past 12 months    Recent Labs   Lab Test 03/28/18  1155   POTASSIUM 4.2             doxepin (SINEQUAN) 25 MG capsule  Last Written Prescription Date:  8/21/2018  Last Fill Quantity: 270 capsule,  # refills: 1   Last office visit: 3/28/2018 with prescribing provider:  Leda   Future Office Visit:     270 capsule 1     Sig: Take 3 capsules (75 mg) by mouth At Bedtime    Tricyclic Antidepressants Protocol Failed - 3/11/2019  1:11 PM       Failed - Blood pressure under 140/90 in past 12 months    BP Readings from Last 3 Encounters:   03/28/18 140/80   11/30/17 134/57   11/13/17 126/80                Passed - Recent (12 mo) or future (30 days) visit within the authorizing provider's specialty     Patient had office " "visit in the last 12 months or has a visit in the next 30 days with authorizing provider or within the authorizing provider's specialty.  See \"Patient Info\" tab in inbasket, or \"Choose Columns\" in Meds & Orders section of the refill encounter.             Passed - Medication is active on med list       Passed - Patient is age 18 or older           "

## 2019-03-11 NOTE — TELEPHONE ENCOUNTER
"Requested Prescriptions   Pending Prescriptions Disp Refills     dutasteride (AVODART) 0.5 MG capsule  Last Written Prescription Date:  8/22/2018  Last Fill Quantity: 90 capsule,  # refills: 1   Last office visit: 3/28/2018 with prescribing provider:  Leda   Future Office Visit:     90 capsule 1    Alpha Blockers Failed - 3/11/2019  1:09 PM       Failed - Blood pressure under 140/90 in past 12 months    BP Readings from Last 3 Encounters:   03/28/18 140/80   11/30/17 134/57   11/13/17 126/80                Passed - Recent (12 mo) or future (30 days) visit within the authorizing provider's specialty    Patient had office visit in the last 12 months or has a visit in the next 30 days with authorizing provider or within the authorizing provider's specialty.  See \"Patient Info\" tab in inbasket, or \"Choose Columns\" in Meds & Orders section of the refill encounter.             Passed - Patient does not have Tadalafil, Vardenafil, or Sildenafil on their medication list       Passed - Medication is active on med list       Passed - Patient is 18 years of age or older        venlafaxine (EFFEXOR-XR) 150 MG 24 hr capsule  Last Written Prescription Date:  11/20/2018  Last Fill Quantity: 180 capsule,  # refills: 0   Last office visit: 3/28/2018 with prescribing provider:  Leda   Future Office Visit:     180 capsule 0     Sig: Take 1 capsule (150 mg) by mouth 2 times daily    Serotonin-Norepinephrine Reuptake Inhibitors  Failed - 3/11/2019  1:09 PM       Failed - Blood pressure under 140/90 in past 12 months    BP Readings from Last 3 Encounters:   03/28/18 140/80   11/30/17 134/57   11/13/17 126/80                Failed - PHQ-9 score of less than 5 in past 6 months    Please review last PHQ-9 score.   PHQ-9 SCORE 6/6/2016 7/25/2017 3/28/2018   PHQ-9 Total Score MyChart - - 1 (Minimal depression)   PHQ-9 Total Score 1 0 1     No flowsheet data found.         Failed - Recent (6 mo) or future (30 days) visit within the " "authorizing provider's specialty    Patient had office visit in the last 6 months or has a visit in the next 30 days with authorizing provider or within the authorizing provider's specialty.  See \"Patient Info\" tab in inbasket, or \"Choose Columns\" in Meds & Orders section of the refill encounter.           Passed - Medication is active on med list       Passed - Patient is age 18 or older       Passed - Normal serum creatinine on file in past 12 months    Recent Labs   Lab Test 03/28/18  1155   CR 0.71                "

## 2019-03-12 NOTE — TELEPHONE ENCOUNTER
Routing refill request to provider for review/approval because:  BP out of range    Sent patient a [employer] message informing him that he is due for an annual physical.

## 2019-03-13 RX ORDER — DUTASTERIDE 0.5 MG/1
CAPSULE, LIQUID FILLED ORAL
Qty: 90 CAPSULE | Refills: 0 | Status: SHIPPED | OUTPATIENT
Start: 2019-03-13 | End: 2019-06-05

## 2019-03-13 RX ORDER — VENLAFAXINE HYDROCHLORIDE 150 MG/1
150 CAPSULE, EXTENDED RELEASE ORAL 2 TIMES DAILY
Qty: 180 CAPSULE | Refills: 0 | Status: SHIPPED | OUTPATIENT
Start: 2019-03-13 | End: 2019-06-05

## 2019-03-13 RX ORDER — DOXEPIN HYDROCHLORIDE 25 MG/1
75 CAPSULE ORAL AT BEDTIME
Qty: 270 CAPSULE | Refills: 0 | Status: SHIPPED | OUTPATIENT
Start: 2019-03-13 | End: 2019-06-05

## 2019-03-13 RX ORDER — LISINOPRIL 10 MG/1
10 TABLET ORAL DAILY
Qty: 90 TABLET | Refills: 0 | Status: SHIPPED | OUTPATIENT
Start: 2019-03-13 | End: 2019-06-05

## 2019-04-28 NOTE — ANESTHESIA POSTPROCEDURE EVALUATION
Patient: Stuart Moran    Procedure(s):  (COSMETIC) SAGITTAL SPLIT OSTEOTOMY  - Wound Class: II-Clean Contaminated    Diagnosis:MANDIBULAR HYPOPLASIA   Diagnosis Additional Information: No value filed.    Anesthesia Type:  General    Note:  Anesthesia Post Evaluation    Patient location during evaluation: PACU  Patient participation: Able to fully participate in evaluation  Level of consciousness: awake and alert  Pain management: adequate  Airway patency: patent  Cardiovascular status: acceptable, hypertensive and hemodynamically stable  Respiratory status: acceptable  Hydration status: acceptable  PONV: none     Anesthetic complications: None    Comments: Labetolol given with good response. Little pain, minimal bleeding from left nares, afrin given. No complications.         Last vitals:  Vitals:    11/29/17 1610 11/29/17 1620 11/29/17 1630   BP: (!) 176/97 147/86 165/71   Pulse:      Resp: 10 24 9   Temp:      SpO2: 93% 92% 95%         Electronically Signed By: Socorro Marshall MD  November 29, 2017  4:42 PM  
Oriented - self; Oriented - place; Oriented - time

## 2019-05-06 ENCOUNTER — MYC REFILL (OUTPATIENT)
Dept: FAMILY MEDICINE | Facility: CLINIC | Age: 69
End: 2019-05-06

## 2019-05-06 DIAGNOSIS — M48.02 SPINAL STENOSIS OF CERVICAL REGION: ICD-10-CM

## 2019-05-06 DIAGNOSIS — E78.5 HYPERLIPIDEMIA LDL GOAL <100: Chronic | ICD-10-CM

## 2019-05-07 NOTE — TELEPHONE ENCOUNTER
"Requested Prescriptions   Pending Prescriptions Disp Refills     cyclobenzaprine (FLEXERIL) 10 MG tablet      Last Written Prescription Date:  8/22/2018  Last Fill Quantity: 90 tablet,  # refills: 0   Last office visit: 3/28/2018 with prescribing provider:  Leda   Future Office Visit:   Next 5 appointments (look out 90 days)    May 13, 2019 10:45 AM CDT  [facility]YSICAL with [facility]pkins [facility]  [facility] [address] [place] [id number]  [phone]             Routing refill request to provider for review/approval because:  Drug not on the [facility], [facility] or [facility] refill protocol or controlled substance                   atorvastatin (LIPITOR) 80 MG tablet  Last Written Prescription Date:  11/20/2018  Last Fill Quantity: 90 tablet,  # refills: 0   Last office visit: 3/28/2018 with prescribing provider:  Leda   Future Office Visit:   Next 5 appointments (look out 90 days)    May 13, 2019 10:45 AM CDT  PHYSICAL with Lucas Tompkins MD  [facility] [address] [place] [id number]  [phone]          90 tablet 0     Sig: Take 1 tablet (80 mg) by mouth daily       Statins Protocol Failed - 5/7/2019 10:59 AM        Failed - LDL on file in past 12 months     Recent Labs   Lab Test 07/25/17  1026   LDL 71             Passed - No abnormal creatine kinase in past 12 months     No lab results found.             Passed - Recent (12 mo) or future (30 days) visit within the authorizing provider's specialty     Patient had office visit in the last 12 months or has a visit in the next 30 days with authorizing provider or within the authorizing provider's specialty.  See \"Patient Info\" tab in inbasket, or \"Choose Columns\" in Meds & Orders section of the refill encounter.              Passed - Medication is " active on med list        Passed - Patient is age 18 or older

## 2019-05-08 RX ORDER — ATORVASTATIN CALCIUM 80 MG/1
80 TABLET, FILM COATED ORAL DAILY
Qty: 30 TABLET | Refills: 0 | Status: SHIPPED | OUTPATIENT
Start: 2019-05-08 | End: 2019-05-13

## 2019-05-08 NOTE — TELEPHONE ENCOUNTER
Routing refill request to provider for review/approval because:  Drug not on the [facility] refill protocol     Medication is being filled for 1 time refill only due to:  Patient needs labs LDL.    Contacted patient to inform that refills are being processed. He is aware that he should be fasting for labs on Monday and was reminded to make sure he gets all of his refills updated.

## 2019-05-09 RX ORDER — CYCLOBENZAPRINE HCL 10 MG
10 TABLET ORAL 3 TIMES DAILY PRN
Qty: 90 TABLET | Refills: 0 | Status: SHIPPED | OUTPATIENT
Start: 2019-05-09 | End: 2021-05-19

## 2019-05-12 ASSESSMENT — ACTIVITIES OF DAILY LIVING (ADL): CURRENT_FUNCTION: NO ASSISTANCE NEEDED

## 2019-05-13 ENCOUNTER — OFFICE VISIT (OUTPATIENT)
Dept: FAMILY MEDICINE | Facility: CLINIC | Age: 69
End: 2019-05-13
Payer: COMMERCIAL

## 2019-05-13 VITALS
BODY MASS INDEX: 28.19 KG/M2 | HEART RATE: 81 BPM | TEMPERATURE: 97.4 F | WEIGHT: 186 LBS | SYSTOLIC BLOOD PRESSURE: 120 MMHG | OXYGEN SATURATION: 98 % | DIASTOLIC BLOOD PRESSURE: 74 MMHG | HEIGHT: 68 IN

## 2019-05-13 DIAGNOSIS — Z00.00 ROUTINE MEDICAL EXAM: Primary | ICD-10-CM

## 2019-05-13 DIAGNOSIS — I10 HYPERTENSION GOAL BP (BLOOD PRESSURE) < 140/90: ICD-10-CM

## 2019-05-13 DIAGNOSIS — N40.0 BENIGN PROSTATIC HYPERPLASIA WITHOUT LOWER URINARY TRACT SYMPTOMS: ICD-10-CM

## 2019-05-13 DIAGNOSIS — F33.42 DEPRESSION, MAJOR, RECURRENT, IN COMPLETE REMISSION (H): ICD-10-CM

## 2019-05-13 DIAGNOSIS — Z12.5 SCREENING FOR PROSTATE CANCER: ICD-10-CM

## 2019-05-13 DIAGNOSIS — M62.830 BACK MUSCLE SPASM: ICD-10-CM

## 2019-05-13 DIAGNOSIS — F90.0 ATTENTION DEFICIT HYPERACTIVITY DISORDER (ADHD), PREDOMINANTLY INATTENTIVE TYPE: ICD-10-CM

## 2019-05-13 DIAGNOSIS — E78.5 HYPERLIPIDEMIA LDL GOAL <100: ICD-10-CM

## 2019-05-13 DIAGNOSIS — F41.9 ANXIETY: ICD-10-CM

## 2019-05-13 DIAGNOSIS — K21.9 GASTROESOPHAGEAL REFLUX DISEASE WITHOUT ESOPHAGITIS: ICD-10-CM

## 2019-05-13 DIAGNOSIS — L91.8 SKIN TAG: ICD-10-CM

## 2019-05-13 LAB
BASOPHILS # BLD AUTO: 0 10E9/L (ref 0–0.2)
BASOPHILS NFR BLD AUTO: 0 %
DIFFERENTIAL METHOD BLD: NORMAL
EOSINOPHIL # BLD AUTO: 0.1 10E9/L (ref 0–0.7)
EOSINOPHIL NFR BLD AUTO: 1.6 %
ERYTHROCYTE [DISTWIDTH] IN BLOOD BY AUTOMATED COUNT: 13.1 % (ref 10–15)
HCT VFR BLD AUTO: 41.4 % (ref 40–53)
HGB BLD-MCNC: 14.3 G/DL (ref 13.3–17.7)
LYMPHOCYTES # BLD AUTO: 0.8 10E9/L (ref 0.8–5.3)
LYMPHOCYTES NFR BLD AUTO: 16.8 %
MCH RBC QN AUTO: 30.8 PG (ref 26.5–33)
MCHC RBC AUTO-ENTMCNC: 34.5 G/DL (ref 31.5–36.5)
MCV RBC AUTO: 89 FL (ref 78–100)
MONOCYTES # BLD AUTO: 0.5 10E9/L (ref 0–1.3)
MONOCYTES NFR BLD AUTO: 9.7 %
NEUTROPHILS # BLD AUTO: 3.5 10E9/L (ref 1.6–8.3)
NEUTROPHILS NFR BLD AUTO: 71.9 %
PLATELET # BLD AUTO: 217 10E9/L (ref 150–450)
RBC # BLD AUTO: 4.64 10E12/L (ref 4.4–5.9)
WBC # BLD AUTO: 4.9 10E9/L (ref 4–11)

## 2019-05-13 PROCEDURE — 80053 COMPREHEN METABOLIC PANEL: CPT | Performed by: FAMILY MEDICINE

## 2019-05-13 PROCEDURE — 99397 PER PM REEVAL EST PAT 65+ YR: CPT | Performed by: FAMILY MEDICINE

## 2019-05-13 PROCEDURE — 36415 COLL VENOUS BLD VENIPUNCTURE: CPT | Performed by: FAMILY MEDICINE

## 2019-05-13 PROCEDURE — 85025 COMPLETE CBC W/AUTO DIFF WBC: CPT | Performed by: FAMILY MEDICINE

## 2019-05-13 PROCEDURE — 81001 URINALYSIS AUTO W/SCOPE: CPT | Performed by: FAMILY MEDICINE

## 2019-05-13 PROCEDURE — G0103 PSA SCREENING: HCPCS | Performed by: FAMILY MEDICINE

## 2019-05-13 PROCEDURE — 80061 LIPID PANEL: CPT | Performed by: FAMILY MEDICINE

## 2019-05-13 RX ORDER — LISDEXAMFETAMINE DIMESYLATE 70 MG/1
70 CAPSULE ORAL DAILY
Qty: 30 CAPSULE | Refills: 0 | Status: SHIPPED | OUTPATIENT
Start: 2019-05-13 | End: 2019-07-24

## 2019-05-13 RX ORDER — LISDEXAMFETAMINE DIMESYLATE 70 MG/1
70 CAPSULE ORAL DAILY
Qty: 30 CAPSULE | Refills: 0 | Status: SHIPPED | OUTPATIENT
Start: 2019-06-13 | End: 2019-07-24

## 2019-05-13 RX ORDER — ATORVASTATIN CALCIUM 80 MG/1
80 TABLET, FILM COATED ORAL DAILY
Qty: 90 TABLET | Refills: 1 | Status: SHIPPED | OUTPATIENT
Start: 2019-05-13 | End: 2019-11-30

## 2019-05-13 RX ORDER — LISDEXAMFETAMINE DIMESYLATE 70 MG/1
70 CAPSULE ORAL DAILY
Qty: 30 CAPSULE | Refills: 0 | Status: SHIPPED | OUTPATIENT
Start: 2019-07-14 | End: 2019-08-26

## 2019-05-13 ASSESSMENT — ACTIVITIES OF DAILY LIVING (ADL): CURRENT_FUNCTION: NO ASSISTANCE NEEDED

## 2019-05-13 ASSESSMENT — MIFFLIN-ST. JEOR: SCORE: 1583.19

## 2019-05-13 NOTE — PROGRESS NOTES
"SUBJECTIVE:   Stuart Moran is a 69 year old male who presents for Preventive Visit.      Are you in the first 12 months of your Medicare coverage?  No    Healthy Habits:     In general, how would you rate your overall health?  Good    Frequency of exercise:  2-3 days/week    Duration of exercise:  45-60 minutes    Do you usually eat at least 4 servings of fruit and vegetables a day, include whole grains    & fiber and avoid regularly eating high fat or \"junk\" foods?  Yes    Taking medications regularly:  Yes    Medication side effects:  None    Ability to successfully perform activities of daily living:  No assistance needed    Home Safety:  Lack of grab bars in the bathroom    Hearing Impairment:  No hearing concerns    In the past 6 months, have you been bothered by leaking of urine?  No    In general, how would you rate your overall mental or emotional health?  Good      PHQ-2 Total Score: 0    Additional concerns today:  Yes    Do you feel safe in your environment? Yes    Do you have a Health Care Directive? Yes: Advance Directive has been received and scanned.      Fall risk  Fallen 2 or more times in the past year?: No  Any fall with injury in the past year?: No  click delete button to remove this line now  Cognitive Screening   1) Repeat 3 items (Leader, Season, Table)    2) Clock draw: NORMAL  3) 3 item recall: Recalls 3 objects  Results: 3 items recalled: COGNITIVE IMPAIRMENT LESS LIKELY    Mini-CogTM Copyright S [employer]ess[employer]. Licensed by the author for use in [facility]; reprinted with permission (main@.[facility]). All rights reserved.      Do you have sleep apnea, excessive snoring or daytime drowsiness?: yes    Reviewed and updated as needed this visit by clinical staff  Tobacco  Allergies  Meds  Med Hx  Surg Hx  Fam Hx  Soc Hx        Reviewed and updated as needed this visit by Provider        Social History     Tobacco Use     Smoking status: Never Smoker     Smokeless tobacco: Never Used "   Substance Use Topics     Alcohol use: No         No flowsheet data found.            Current providers sharing in care for this patient include:   Patient Care Team:  Lucas Tompkins MD as PCP - General (Family Practice)  Lucas Tompkins MD as Assigned PCP    The following health maintenance items are reviewed in Epic and correct as of today:  Health Maintenance   Topic Date Due     LIPID  1950     COLONOSCOPY  01/27/1960     AORTIC ANEURYSM SCREENING (SYSTEM ASSIGNED)  01/27/2015     ADVANCED DIRECTIVE PLANNING  08/22/2018     PHQ-9  09/28/2018     MEDICARE ANNUAL WELLNESS VISIT  05/13/2020     FALL RISK ASSESSMENT  05/13/2020     DTAP/TDAP/TD IMMUNIZATION (3 - Td) 07/25/2027     HEPATITIS C SCREENING  Completed     DEPRESSION ACTION PLAN  Completed     INFLUENZA VACCINE  Completed     ZOSTER IMMUNIZATION  Completed     IPV IMMUNIZATION  Aged Out     MENINGITIS IMMUNIZATION  Aged Out     Patient Active Problem List   Diagnosis     Hypertension goal BP (blood pressure) < 140/90     ADHD (attention deficit hyperactivity disorder)     Depression, major, recurrent, in complete remission (H)     Insomnia     Hyperlipidemia LDL goal <100     BPH (benign prostatic hyperplasia)     Spinal stenosis of cervical region     ACP (advance care planning)     Anxiety     Back muscle spasm     Gastroesophageal reflux disease without esophagitis     Screening for prostate cancer     Mandibular hypoplasia     Skin tag     Past Surgical History:   Procedure Laterality Date     APPENDECTOMY       CARPAL TUNNEL RELEASE RT/LT      bilateral     CATARACT IOL, RT/LT Bilateral 2016     COSMETIC OSTEOTOMY SAGITTAL SPLIT WITH MANDIBLE ADVANCEMENT N/A 11/29/2017    Procedure: COSMETIC OSTEOTOMY SAGITTAL SPLIT WITH MANDIBLE ADVANCEMENT;  (COSMETIC) SAGITTAL SPLIT OSTEOTOMY ;  Surgeon: Daniel Hyde DDS;  Location: [facility]     EYE SURGERY      bilateral strabismus     HERNIA REPAIR      umbilical     ORTHOPEDIC SURGERY    "   left knee meniscus       Social History     Tobacco Use     Smoking status: Never Smoker     Smokeless tobacco: Never Used   Substance Use Topics     Alcohol use: No     Family History   Problem Relation Age of Onset     Alzheimer Disease Father      C.A.D. Mother         PTCA     Thyroid Disease Mother         grave's disease     Heart Disease Mother         aortic valve replacement     Thyroid Disease Sister         grave's     Thyroid Disease Sister         graves             Review of Systems  CONSTITUTIONAL: NEGATIVE for fever, chills, change in weight  INTEGUMENTARY/SKIN: NEGATIVE for worrisome rashes, moles or lesions but does have a benign appearing skin tag on the leg  EYES: NEGATIVE for vision changes or irritation  ENT/MOUTH: NEGATIVE for ear, mouth and throat problems  RESP: NEGATIVE for significant cough or SOB  BREAST: NEGATIVE for masses, tenderness or discharge  CV: NEGATIVE for chest pain, palpitations or peripheral edema  GI: NEGATIVE for nausea, abdominal pain, heartburn, or change in bowel habits  : NEGATIVE for frequency, dysuria, or hematuria  MUSCULOSKELETAL:POSITIVE  for muscle spasm neck and back  NEURO: NEGATIVE for weakness, dizziness or paresthesias  ENDOCRINE: NEGATIVE for temperature intolerance, skin/hair changes  HEME: NEGATIVE for bleeding problems  PSYCHIATRIC: NEGATIVE for changes in mood or affect    OBJECTIVE:   /74 (Cuff Size: Adult Large)   Pulse 81   Temp 97.4  F (36.3  C) (Tympanic)   Ht 1.727 m (5' 8\")   Wt 84.4 kg (186 lb)   SpO2 98%   BMI 28.28 kg/m   Estimated body mass index is 28.28 kg/m  as calculated from the following:    Height as of this encounter: 1.727 m (5' 8\").    Weight as of this encounter: 84.4 kg (186 lb).  Physical Exam  GENERAL: healthy, alert and no distress  EYES: Eyes grossly normal to inspection, PERRL and conjunctivae and sclerae normal  HENT: ear canals and TM's normal, nose and mouth without ulcers or lesions  NECK: no adenopathy, " no asymmetry, masses, or scars and thyroid normal to palpation  RESP: lungs clear to auscultation - no rales, rhonchi or wheezes  CV: regular rate and rhythm, normal S1 S2, no S3 or S4, no murmur, click or rub, no peripheral edema and peripheral pulses strong  ABDOMEN: soft, nontender, no hepatosplenomegaly, no masses and bowel sounds normal   (male): normal male genitalia without lesions or urethral discharge, no hernia  RECTAL: normal sphincter tone, no rectal masses, prostate normal size, smooth, nontender without nodules or masses  MS: no gross musculoskeletal defects noted, no edema  SKIN: no suspicious lesions or rashes with skin tag on the leg  NEURO: Normal strength and tone, mentation intact and speech normal  PSYCH: mentation appears normal, affect normal/bright        ASSESSMENT / PLAN:       ICD-10-CM    1. Routine medical exam Z00.00    2. Hypertension goal BP (blood pressure) < 140/90 I10 UA with Microscopic     CBC with platelets differential     Comprehensive metabolic panel   3. Attention deficit hyperactivity disorder (ADHD), predominantly inattentive type F90.0 lisdexamfetamine (VYVANSE) 70 MG capsule     lisdexamfetamine (VYVANSE) 70 MG capsule     lisdexamfetamine (VYVANSE) 70 MG capsule   4. Anxiety F41.9    5. Depression, major, recurrent, in complete remission (H) F33.42    6. Benign prostatic hyperplasia without lower urinary tract symptoms N40.0    7. Hyperlipidemia LDL goal <100 E78.5 Lipid Profile     atorvastatin (LIPITOR) 80 MG tablet   8. Gastroesophageal reflux disease without esophagitis K21.9    9. Back muscle spasm M62.830    10. Screening for prostate cancer Z12.5 Prostate spec antigen screen   11. Skin tag L91.8 SKIN CARE REFERRAL       End of Life Planning:  Patient currently has an advanced directive: No.  I have verified the patient's ablity to prepare an advanced directive/make health care decisions.  Literature was provided to assist patient in preparing an advanced  "directive.    COUNSELING:  Reviewed preventive health counseling, as reflected in patient instructions       Regular exercise       Healthy diet/nutrition       Prostate cancer screening    Estimated body mass index is 28.28 kg/m  as calculated from the following:    Height as of this encounter: 1.727 m (5' 8\").    Weight as of this encounter: 84.4 kg (186 lb).         reports that he has never smoked. He has never used smokeless tobacco.      Appropriate preventive services were discussed with this patient, including applicable screening as appropriate for cardiovascular disease, diabetes, osteopenia/osteoporosis, and glaucoma.  As appropriate for age/gender, discussed screening for colorectal cancer, prostate cancer, breast cancer, and cervical cancer. Checklist reviewing preventive services available has been given to the patient.    Reviewed patients plan of care and provided an AVS. The Basic Care Plan (routine screening as documented in Health Maintenance) for Stuart meets the Care Plan requirement. This Care Plan has been established and reviewed with the Patient.    Counseling Resources:  ATP IV Guidelines  Pooled Cohorts Equation Calculator  Breast Cancer Risk Calculator  FRAX Risk Assessment  ICSI Preventive Guidelines  Dietary Guidelines for Americans, 2010  [employer]'s MyPlate  ASA Prophylaxis  Lung CA Screening    Lucas Tompkins MD  [facility]    Identified Health Risks:  "

## 2019-05-14 LAB
ALBUMIN SERPL-MCNC: 4.1 G/DL (ref 3.4–5)
ALBUMIN UR-MCNC: NEGATIVE MG/DL
ALP SERPL-CCNC: 75 U/L (ref 40–150)
ALT SERPL W P-5'-P-CCNC: 43 U/L (ref 0–70)
ANION GAP SERPL CALCULATED.3IONS-SCNC: 8 MMOL/L (ref 3–14)
APPEARANCE UR: CLEAR
AST SERPL W P-5'-P-CCNC: 34 U/L (ref 0–45)
BILIRUB SERPL-MCNC: 0.6 MG/DL (ref 0.2–1.3)
BILIRUB UR QL STRIP: NEGATIVE
BUN SERPL-MCNC: 17 MG/DL (ref 7–30)
CALCIUM SERPL-MCNC: 9.1 MG/DL (ref 8.5–10.1)
CHLORIDE SERPL-SCNC: 107 MMOL/L (ref 94–109)
CHOLEST SERPL-MCNC: 228 MG/DL
CO2 SERPL-SCNC: 24 MMOL/L (ref 20–32)
COLOR UR AUTO: YELLOW
CREAT SERPL-MCNC: 0.74 MG/DL (ref 0.66–1.25)
GFR SERPL CREATININE-BSD FRML MDRD: >90 ML/MIN/{1.73_M2}
GLUCOSE SERPL-MCNC: 83 MG/DL (ref 70–99)
GLUCOSE UR STRIP-MCNC: NEGATIVE MG/DL
HDLC SERPL-MCNC: 55 MG/DL
HGB UR QL STRIP: NEGATIVE
KETONES UR STRIP-MCNC: NEGATIVE MG/DL
LDLC SERPL CALC-MCNC: 151 MG/DL
LEUKOCYTE ESTERASE UR QL STRIP: NEGATIVE
NITRATE UR QL: NEGATIVE
NONHDLC SERPL-MCNC: 173 MG/DL
PH UR STRIP: 7 PH (ref 5–7)
POTASSIUM SERPL-SCNC: 4.3 MMOL/L (ref 3.4–5.3)
PROT SERPL-MCNC: 7.1 G/DL (ref 6.8–8.8)
PSA SERPL-ACNC: 4.14 UG/L (ref 0–4)
RBC #/AREA URNS AUTO: NORMAL /HPF
SODIUM SERPL-SCNC: 139 MMOL/L (ref 133–144)
SOURCE: NORMAL
SP GR UR STRIP: 1.02 (ref 1–1.03)
TRIGL SERPL-MCNC: 112 MG/DL
UROBILINOGEN UR STRIP-ACNC: 0.2 EU/DL (ref 0.2–1)
WBC #/AREA URNS AUTO: NORMAL /HPF

## 2019-05-20 ENCOUNTER — TELEPHONE (OUTPATIENT)
Dept: FAMILY MEDICINE | Facility: CLINIC | Age: 69
End: 2019-05-20

## 2019-05-20 NOTE — TELEPHONE ENCOUNTER
Prior Authorization Retail Medication Request    Medication/Dose:   ICD code (if different than what is on RX):     Previously Tried and Failed:     Rationale:       Insurance Name:     Insurance ID:         Pharmacy Information (if different than what is on RX)  Name:  [facility]   Phone:  [phone]  Reference # [id number]

## 2019-05-22 NOTE — TELEPHONE ENCOUNTER
Central Prior Authorization Team   Phone: [phone]      PA Initiation    Medication: lisdexamfetamine (VYVANSE) 70 MG capsule  Insurance Company: [employer] - Phone [phone] Fax [phone]  Pharmacy Filling the Rx: [facility] - [place], [place] - [address] AT PORTAL TO REGISTERED [facility] SITES  Filling Pharmacy Phone: [phone]  Filling Pharmacy Fax:    Start Date: 5/22/2019

## 2019-05-22 NOTE — TELEPHONE ENCOUNTER
Prior Authorization Approval    Authorization Effective Date: 2/21/2019  Authorization Expiration Date: 5/21/2020  Medication: lisdexamfetamine (VYVANSE) 70 MG capsule  Approved Dose/Quantity:    Reference #:     Insurance Company: [employer] - Phone [phone] Fax [phone]  Expected CoPay:       CoPay Card Available:      Foundation Assistance Needed:    Which Pharmacy is filling the prescription (Not needed for infusion/clinic administered): [facility] - [place], [place] - [address] AT PORTAL TO REGISTERED [facility]  Pharmacy Notified: No  Patient Notified: Yes - left message

## 2019-06-05 DIAGNOSIS — I10 ESSENTIAL HYPERTENSION WITH GOAL BLOOD PRESSURE LESS THAN 140/90: Chronic | ICD-10-CM

## 2019-06-05 DIAGNOSIS — N40.0 BENIGN PROSTATIC HYPERPLASIA WITHOUT LOWER URINARY TRACT SYMPTOMS: ICD-10-CM

## 2019-06-05 DIAGNOSIS — F33.42 MAJOR DEPRESSIVE DISORDER, RECURRENT EPISODE, IN FULL REMISSION (H): ICD-10-CM

## 2019-06-05 DIAGNOSIS — F51.01 PRIMARY INSOMNIA: ICD-10-CM

## 2019-06-05 RX ORDER — LISINOPRIL 10 MG/1
TABLET ORAL
Qty: 90 TABLET | Refills: 2 | Status: SHIPPED | OUTPATIENT
Start: 2019-06-05 | End: 2020-02-04

## 2019-06-05 RX ORDER — DUTASTERIDE 0.5 MG/1
CAPSULE, LIQUID FILLED ORAL
Qty: 90 CAPSULE | Refills: 2 | Status: SHIPPED | OUTPATIENT
Start: 2019-06-05 | End: 2020-02-04

## 2019-06-05 RX ORDER — VENLAFAXINE HYDROCHLORIDE 150 MG/1
CAPSULE, EXTENDED RELEASE ORAL
Qty: 180 CAPSULE | Refills: 0 | Status: SHIPPED | OUTPATIENT
Start: 2019-06-05 | End: 2020-02-04

## 2019-06-05 RX ORDER — DOXEPIN HYDROCHLORIDE 25 MG/1
CAPSULE ORAL
Qty: 270 CAPSULE | Refills: 0 | Status: SHIPPED | OUTPATIENT
Start: 2019-06-05 | End: 2020-02-04

## 2019-06-05 NOTE — TELEPHONE ENCOUNTER
"Requested Prescriptions   Pending Prescriptions Disp Refills     dutasteride (AVODART) 0.5 MG capsule [Pharmacy Med Name: DUTASTERIDE  CAP 0.5MG]  Last Written Prescription Date:  3/13/19  Last Fill Quantity: 90,  # refills: 0   Last office visit: 5/13/2019 with prescribing provider:  sekou   Future Office Visit:     90 capsule 0     Sig: TAKE 1 CAPSULE DAILY       Alpha Blockers Passed - 6/5/2019  5:31 AM        Passed - Blood pressure under 140/90 in past 12 months     BP Readings from Last 3 Encounters:   05/13/19 120/74   03/28/18 140/80   11/30/17 134/57                 Passed - Recent (12 mo) or future (30 days) visit within the authorizing provider's specialty     Patient had office visit in the last 12 months or has a visit in the next 30 days with authorizing provider or within the authorizing provider's specialty.  See \"Patient Info\" tab in inbasket, or \"Choose Columns\" in Meds & Orders section of the refill encounter.              Passed - Patient does not have Tadalafil, Vardenafil, or Sildenafil on their medication list        Passed - Medication is active on med list        Passed - Patient is 18 years of age or older        venlafaxine (EFFEXOR-XR) 150 MG 24 hr capsule [Pharmacy Med Name: VENLAFAXINE  CAP 150MG ER]  Last Written Prescription Date:  3/13/19  Last Fill Quantity: 180,  # refills: 0   Last office visit: 5/13/2019 with prescribing provider:  Sekou   Future Office Visit:     180 capsule 0     Sig: TAKE 1 CAPSULE TWICE DAILY       Serotonin-Norepinephrine Reuptake Inhibitors  Failed - 6/5/2019  5:31 AM        Failed - PHQ-9 score of less than 5 in past 6 months     Please review last PHQ-9 score.           Passed - Blood pressure under 140/90 in past 12 months     BP Readings from Last 3 Encounters:   05/13/19 120/74   03/28/18 140/80   11/30/17 134/57                 Passed - Medication is active on med list        Passed - Patient is age 18 or older        Passed - Normal serum " "creatinine on file in past 12 months     Recent Labs   Lab Test 05/13/19  1125   CR 0.74             Passed - Recent (6 mo) or future (30 days) visit within the authorizing provider's specialty     Patient had office visit in the last 6 months or has a visit in the next 30 days with authorizing provider or within the authorizing provider's specialty.  See \"Patient Info\" tab in inbasket, or \"Choose Columns\" in Meds & Orders section of the refill encounter.            doxepin (SINEQUAN) 25 MG capsule [Pharmacy Med Name: DOXEPIN HCL  CAP 25MG]  Last Written Prescription Date:  3/13/19  Last Fill Quantity: 270,  # refills: 0   Last office visit: 5/13/2019 with prescribing provider:  Leda   Future Office Visit:     270 capsule 0     Sig: TAKE 3 CAPSULES (75MG) AT  BEDTIME       Tricyclic Antidepressants Protocol Failed - 6/5/2019  5:31 AM        Failed - PHQ-9 score less than 5 in past 6 months     Please review last PHQ-9 score.           Passed - Blood pressure under 140/90 in past 12 months     BP Readings from Last 3 Encounters:   05/13/19 120/74   03/28/18 140/80   11/30/17 134/57                 Passed - Medication is active on med list        Passed - Patient is age 18 or older        Passed - Recent (6 mo) or future (30 days) visit within the authorizing provider's specialty     Patient had office visit in the last 6 months or has a visit in the next 30 days with authorizing provider or within the authorizing provider's specialty.  See \"Patient Info\" tab in inbasket, or \"Choose Columns\" in Meds & Orders section of the refill encounter.            lisinopril (PRINIVIL/ZESTRIL) 10 MG tablet [Pharmacy Med Name: LISINOPRIL TAB 10MG]  Last Written Prescription Date:  3/13/19  Last Fill Quantity: 90,  # refills: 0   Last office visit: 5/13/2019 with prescribing provider:  Leda   Future Office Visit:     90 tablet 0     Sig: TAKE 1 TABLET DAILY       ACE Inhibitors (Including Combos) Protocol Passed - 6/5/2019 " " 5:31 AM        Passed - Blood pressure under 140/90 in past 12 months     BP Readings from Last 3 Encounters:   05/13/19 120/74   03/28/18 140/80   11/30/17 134/57                 Passed - Recent (12 mo) or future (30 days) visit within the authorizing provider's specialty     Patient had office visit in the last 12 months or has a visit in the next 30 days with authorizing provider or within the authorizing provider's specialty.  See \"Patient Info\" tab in inbasket, or \"Choose Columns\" in Meds & Orders section of the refill encounter.              Passed - Medication is active on med list        Passed - Patient is age 18 or older        Passed - Normal serum creatinine on file in past 12 months     Recent Labs   Lab Test 05/13/19  1125   CR 0.74             Passed - Normal serum potassium on file in past 12 months     Recent Labs   Lab Test 05/13/19  1125   POTASSIUM 4.3           \  "

## 2019-06-05 NOTE — TELEPHONE ENCOUNTER
Pt needs   PHQ-9 SCORE 6/6/2016 7/25/2017 3/28/2018   PHQ-9 Total Score MyChart - - 1 (Minimal depression)   PHQ-9 Total Score 1 0 1     PHQ9 survey sent via my chart. Regine refill given.

## 2019-07-24 ENCOUNTER — OFFICE VISIT (OUTPATIENT)
Dept: FAMILY MEDICINE | Facility: CLINIC | Age: 69
End: 2019-07-24
Payer: COMMERCIAL

## 2019-07-24 VITALS
RESPIRATION RATE: 18 BRPM | SYSTOLIC BLOOD PRESSURE: 124 MMHG | BODY MASS INDEX: 28.49 KG/M2 | DIASTOLIC BLOOD PRESSURE: 78 MMHG | WEIGHT: 188 LBS | OXYGEN SATURATION: 97 % | HEIGHT: 68 IN | TEMPERATURE: 96.8 F | HEART RATE: 76 BPM

## 2019-07-24 DIAGNOSIS — S91.312D LACERATION OF LEFT FOOT, SUBSEQUENT ENCOUNTER: Primary | ICD-10-CM

## 2019-07-24 DIAGNOSIS — S90.32XD CONTUSION OF LEFT FOOT, SUBSEQUENT ENCOUNTER: ICD-10-CM

## 2019-07-24 PROCEDURE — 99213 OFFICE O/P EST LOW 20 MIN: CPT | Performed by: FAMILY MEDICINE

## 2019-07-24 ASSESSMENT — PATIENT HEALTH QUESTIONNAIRE - PHQ9: 5. POOR APPETITE OR OVEREATING: NOT AT ALL

## 2019-07-24 ASSESSMENT — ANXIETY QUESTIONNAIRES
6. BECOMING EASILY ANNOYED OR IRRITABLE: NOT AT ALL
1. FEELING NERVOUS, ANXIOUS, OR ON EDGE: NOT AT ALL
5. BEING SO RESTLESS THAT IT IS HARD TO SIT STILL: SEVERAL DAYS
7. FEELING AFRAID AS IF SOMETHING AWFUL MIGHT HAPPEN: NOT AT ALL
IF YOU CHECKED OFF ANY PROBLEMS ON THIS QUESTIONNAIRE, HOW DIFFICULT HAVE THESE PROBLEMS MADE IT FOR YOU TO DO YOUR WORK, TAKE CARE OF THINGS AT HOME, OR GET ALONG WITH OTHER PEOPLE: NOT DIFFICULT AT ALL
3. WORRYING TOO MUCH ABOUT DIFFERENT THINGS: NOT AT ALL
GAD7 TOTAL SCORE: 1
2. NOT BEING ABLE TO STOP OR CONTROL WORRYING: NOT AT ALL

## 2019-07-24 ASSESSMENT — MIFFLIN-ST. JEOR: SCORE: 1592.26

## 2019-07-24 NOTE — PROGRESS NOTES
Subjective     Stuart Moran is a 69 year old male who presents to clinic today for the following health issues:    \[facility]\""      ED/ Follow-Up    Facility:  [facility]  Date of visit: 07/15/2019  Reason for visit: Left Foot Laceration  Current Status: Doing Better    Laceration      Duration: 07/15/2019    Description (location/character/radiation): Cut on Left Foot    Intensity:  mild    Accompanying signs and symptoms: Swelling    History (similar episodes/previous evaluation): None    Precipitating or alleviating factors: Patient was moving a cement laundry tub that fell on his foot in the process.  He was seen in urgent care and had x-rays taken that were negative for fracture.  His laceration was repaired at urgent care.  He is now here for removal of the sutures.    Therapies tried and outcome: Sutures, Ibuprofen and Elevation/Relief        Patient Active Problem List   Diagnosis     Hypertension goal BP (blood pressure) < 140/90     ADHD (attention deficit hyperactivity disorder)     Depression, major, recurrent, in complete remission (H)     Insomnia     Hyperlipidemia LDL goal <100     BPH (benign prostatic hyperplasia)     Spinal stenosis of cervical region     ACP (advance care planning)     Anxiety     Back muscle spasm     Gastroesophageal reflux disease without esophagitis     Screening for prostate cancer     Mandibular hypoplasia     Skin tag     Past Surgical History:   Procedure Laterality Date     APPENDECTOMY       CARPAL TUNNEL RELEASE RT/LT      bilateral     CATARACT IOL, RT/LT Bilateral 2016     COSMETIC OSTEOTOMY SAGITTAL SPLIT WITH MANDIBLE ADVANCEMENT N/A 11/29/2017    Procedure: COSMETIC OSTEOTOMY SAGITTAL SPLIT WITH MANDIBLE ADVANCEMENT;  (COSMETIC) SAGITTAL SPLIT OSTEOTOMY ;  Surgeon: Daniel Hyde DDS;  Location: [facility] OR     EYE SURGERY      bilateral strabismus     HERNIA REPAIR      umbilical     ORTHOPEDIC SURGERY      left knee meniscus       Social History     Tobacco Use      "Smoking status: Never Smoker     Smokeless tobacco: Never Used   Substance Use Topics     Alcohol use: No     Family History   Problem Relation Age of Onset     Alzheimer Disease Father      C.A.D. Mother         PTCA     Thyroid Disease Mother         grave's disease     Heart Disease Mother         aortic valve replacement     Thyroid Disease Sister         grave's     Thyroid Disease Sister         graves             Reviewed and updated as needed this visit by Provider         Review of Systems   ROS COMP: Constitutional, HEENT, cardiovascular, pulmonary, gi and gu systems are negative, except as otherwise noted.  Patient continues to have pain in his left foot.  Continues to have slight swelling.  Has been using crutches and has been nonweightbearing.      Objective    /78   Pulse 76   Temp 96.8  F (36  C) (Tympanic)   Resp 18   Ht 1.727 m (5' 8\")   Wt 85.3 kg (188 lb)   SpO2 97%   BMI 28.59 kg/m    Body mass index is 28.59 kg/m .  Physical Exam   GENERAL APPEARANCE: healthy, alert and no distress  MS: Left foot is slightly swollen distally and there is some slight purplish discoloration to the same area.  The area is tender to touch.  Wound is clean and dry except a 4 mm triangular section about a centimeter proximal on the laceration on the lateral side there is a little bit of yellowish discharge.  SKIN: Laceration as noted above.            Assessment & Plan       ICD-10-CM    1. Laceration of left foot, subsequent encounter S91.312D    2. Contusion of left foot, subsequent encounter S90.32XD         BMI:   Estimated body mass index is 28.59 kg/m  as calculated from the following:    Height as of this encounter: 1.727 m (5' 8\").    Weight as of this encounter: 85.3 kg (188 lb).           Patient Instructions   Sutures were removed without too much difficulty.  Wound is clean and dry except the triangular area a centimeter into the proximal area of the laceration laterally where there is a " yellowish discharge.  The distal 1-1/2 cm of the laceration is slightly .  Steri-Strips were placed.  Patient tolerated the procedure well.  He will start weightbearing as the pain allows.    Patient will return sometime in the next couple of months for his annual wellness exam.      Return in about 2 months (around 9/24/2019) for wellness exam.    Lucas Tompkins MD  [facility]

## 2019-07-24 NOTE — NURSING NOTE
"Chief Complaint   Patient presents with     Urgent Care     Laceration     Suture Removal     /78   Pulse 76   Temp 96.8  F (36  C) (Tympanic)   Resp 18   Ht 1.727 m (5' 8\")   Wt 85.3 kg (188 lb)   SpO2 97%   BMI 28.59 kg/m   Estimated body mass index is 28.59 kg/m  as calculated from the following:    Height as of this encounter: 1.727 m (5' 8\").    Weight as of this encounter: 85.3 kg (188 lb).  BP completed using cuff size: susana Carranza CMA    Health Maintenance Due   Topic Date Due     NATHALIA ASSESSMENT  1950     AORTIC ANEURYSM SCREENING (SYSTEM ASSIGNED)  01/27/2015     ADVANCE CARE PLANNING  08/22/2018     Health Maintenance reviewed at today's visit patient asked to schedule/complete:   Depression:  Patient agrees to schedule    "

## 2019-07-24 NOTE — PATIENT INSTRUCTIONS
Sutures were removed without too much difficulty.  Wound is clean and dry except the triangular area a centimeter into the proximal area of the laceration laterally where there is a yellowish discharge.  The distal 1-1/2 cm of the laceration is slightly .  Steri-Strips were placed.  Patient tolerated the procedure well.  He will start weightbearing as the pain allows.    Patient will return sometime in the next couple of months for his annual wellness exam.

## 2019-07-25 ASSESSMENT — ANXIETY QUESTIONNAIRES: GAD7 TOTAL SCORE: 1

## 2019-08-26 ENCOUNTER — OFFICE VISIT (OUTPATIENT)
Dept: FAMILY MEDICINE | Facility: CLINIC | Age: 69
End: 2019-08-26
Payer: COMMERCIAL

## 2019-08-26 VITALS
HEART RATE: 71 BPM | SYSTOLIC BLOOD PRESSURE: 130 MMHG | BODY MASS INDEX: 28.89 KG/M2 | WEIGHT: 190 LBS | DIASTOLIC BLOOD PRESSURE: 82 MMHG | OXYGEN SATURATION: 97 % | TEMPERATURE: 97.5 F

## 2019-08-26 DIAGNOSIS — F90.0 ATTENTION DEFICIT HYPERACTIVITY DISORDER (ADHD), PREDOMINANTLY INATTENTIVE TYPE: ICD-10-CM

## 2019-08-26 DIAGNOSIS — Z12.5 SCREENING FOR PROSTATE CANCER: ICD-10-CM

## 2019-08-26 DIAGNOSIS — E78.5 HYPERLIPIDEMIA LDL GOAL <100: Primary | ICD-10-CM

## 2019-08-26 DIAGNOSIS — I10 ESSENTIAL HYPERTENSION: ICD-10-CM

## 2019-08-26 PROCEDURE — 80061 LIPID PANEL: CPT | Performed by: FAMILY MEDICINE

## 2019-08-26 PROCEDURE — 99214 OFFICE O/P EST MOD 30 MIN: CPT | Performed by: FAMILY MEDICINE

## 2019-08-26 PROCEDURE — 36415 COLL VENOUS BLD VENIPUNCTURE: CPT | Performed by: FAMILY MEDICINE

## 2019-08-26 PROCEDURE — 84460 ALANINE AMINO (ALT) (SGPT): CPT | Performed by: FAMILY MEDICINE

## 2019-08-26 PROCEDURE — G0103 PSA SCREENING: HCPCS | Performed by: FAMILY MEDICINE

## 2019-08-26 RX ORDER — LISDEXAMFETAMINE DIMESYLATE 70 MG/1
70 CAPSULE ORAL DAILY
Qty: 30 CAPSULE | Refills: 0 | Status: SHIPPED | OUTPATIENT
Start: 2019-08-26 | End: 2019-10-13

## 2019-08-26 RX ORDER — LISDEXAMFETAMINE DIMESYLATE 70 MG/1
70 CAPSULE ORAL DAILY
Qty: 30 CAPSULE | Refills: 0 | Status: SHIPPED | OUTPATIENT
Start: 2019-08-26 | End: 2019-08-26

## 2019-08-26 RX ORDER — ATORVASTATIN CALCIUM 80 MG/1
80 TABLET, FILM COATED ORAL DAILY
Qty: 90 TABLET | Refills: 1 | Status: CANCELLED | OUTPATIENT
Start: 2019-08-26

## 2019-08-26 NOTE — PATIENT INSTRUCTIONS
I refilled the patient's Vyvanse.  He will check with his mail-order pharmacy to see if they will allow him a prescription for that for 90 days.  We will check a lipid profile ALT and PSA today.  The remainder of his tests were just done a couple months ago.

## 2019-08-26 NOTE — PROGRESS NOTES
Subjective     Stuart Moran is a 69 year old male who presents to clinic today for the following health issues:    HPI   Hyperlipidemia Follow-Up      Are you having any of the following symptoms? (Select all that apply)  No complaints of shortness of breath, chest pain or pressure.  No increased sweating or nausea with activity.  No left-sided neck or arm pain.  No complaints of pain in calves when walking 1-2 blocks.    Are you regularly taking any medication or supplement to lower your cholesterol?   Yes- statin    Are you having muscle aches or other side effects that you think could be caused by your cholesterol lowering medication?  No          How many servings of fruits and vegetables do you eat daily?  4 or more    On average, how many sweetened beverages do you drink each day (soda, juice, sweet tea, etc)?   0    How many days per week do you miss taking your medication? 0        Medication Followup of vyvanse    Taking Medication as prescribed: NO-pt ran out this month    Side Effects:  None    Medication Helping Symptoms:  yes     Pt hurt foot 7-15-19 and had sutures place, came to clinic on 7-24-19 and removed sutures and is back today to follow up and make sure healing is good    Patient Active Problem List   Diagnosis     Hypertension goal BP (blood pressure) < 140/90     ADHD (attention deficit hyperactivity disorder)     Depression, major, recurrent, in complete remission (H)     Insomnia     Hyperlipidemia LDL goal <100     BPH (benign prostatic hyperplasia)     Spinal stenosis of cervical region     ACP (advance care planning)     Anxiety     Back muscle spasm     Gastroesophageal reflux disease without esophagitis     Screening for prostate cancer     Mandibular hypoplasia     Skin tag     Past Surgical History:   Procedure Laterality Date     APPENDECTOMY       CARPAL TUNNEL RELEASE RT/LT      bilateral     CATARACT IOL, RT/LT Bilateral 2016     COSMETIC OSTEOTOMY SAGITTAL SPLIT WITH MANDIBLE  ADVANCEMENT N/A 11/29/2017    Procedure: COSMETIC OSTEOTOMY SAGITTAL SPLIT WITH MANDIBLE ADVANCEMENT;  (COSMETIC) SAGITTAL SPLIT OSTEOTOMY ;  Surgeon: Daniel Hyde DDS;  Location: [facility] OR     EYE SURGERY      bilateral strabismus     HERNIA REPAIR      umbilical     ORTHOPEDIC SURGERY      left knee meniscus       Social History     Tobacco Use     Smoking status: Never Smoker     Smokeless tobacco: Never Used   Substance Use Topics     Alcohol use: No     Family History   Problem Relation Age of Onset     Alzheimer Disease Father      C.A.D. Mother         PTCA     Thyroid Disease Mother         grave's disease     Heart Disease Mother         aortic valve replacement     Thyroid Disease Sister         grave's     Thyroid Disease Sister         graves           Reviewed and updated as needed this visit by Provider         Review of Systems   ROS COMP: Constitutional, HEENT, cardiovascular, pulmonary, gi and gu systems are negative, except as otherwise noted.  Patient still has some left foot pain but it is improving on a daily basis.  He thinks is pretty much back to normal in terms of his gait.      Objective    /82 (Cuff Size: Adult Large)   Pulse 71   Temp 97.5  F (36.4  C) (Tympanic)   Wt 86.2 kg (190 lb)   SpO2 97%   BMI 28.89 kg/m    Body mass index is 28.89 kg/m .  Physical Exam   GENERAL APPEARANCE: healthy, alert and no distress  MS: Scar on the left foot continues to heal nicely.  His foot is still a little bit tender to palpation on the left.            Assessment & Plan       ICD-10-CM    1. Hyperlipidemia LDL goal <100 E78.5 Lipid Profile     ALT   2. Attention deficit hyperactivity disorder (ADHD), predominantly inattentive type F90.0 lisdexamfetamine (VYVANSE) 70 MG capsule     DISCONTINUED: lisdexamfetamine (VYVANSE) 70 MG capsule   3. Screening for prostate cancer Z12.5 Prostate spec antigen screen   4. Essential hypertension I10         BMI:   Estimated body mass index is 28.89 kg/m  as  "calculated from the following:    Height as of 7/24/19: 1.727 m (5' 8\").    Weight as of this encounter: 86.2 kg (190 lb).           Patient Instructions   I refilled the patient's Vyvanse.  He will check with his mail-order pharmacy to see if they will allow him a prescription for that for 90 days.  We will check a lipid profile ALT and PSA today.  The remainder of his tests were just done a couple months ago.      Return in about 6 months (around 2/26/2020) for dyslipidemia, hypertension, labs.    Lucas Tompkins MD  [facility]    "

## 2019-08-27 LAB
ALT SERPL W P-5'-P-CCNC: 53 U/L (ref 0–70)
CHOLEST SERPL-MCNC: 156 MG/DL
HDLC SERPL-MCNC: 50 MG/DL
LDLC SERPL CALC-MCNC: 85 MG/DL
NONHDLC SERPL-MCNC: 106 MG/DL
PSA SERPL-ACNC: 3.85 UG/L (ref 0–4)
TRIGL SERPL-MCNC: 103 MG/DL

## 2019-09-03 DIAGNOSIS — F33.42 MAJOR DEPRESSIVE DISORDER, RECURRENT EPISODE, IN FULL REMISSION (H): ICD-10-CM

## 2019-09-03 DIAGNOSIS — F51.01 PRIMARY INSOMNIA: ICD-10-CM

## 2019-09-03 NOTE — TELEPHONE ENCOUNTER
"Requested Prescriptions   Pending Prescriptions Disp Refills     doxepin (SINEQUAN) 25 MG capsule [Pharmacy Med Name: DOXEPIN HCL  CAP 25MG]  Last Written Prescription Date:  6/5/2019  Last Fill Quantity: 270 capsule,  # refills: 0   Last office visit: 8/26/2019 with prescribing provider:  Leda   Future Office Visit:     270 capsule 0     Sig: FOR DIRECTIONS ON HOW TO   TAKE THIS MEDICINE, READ   THE ENCLOSED MEDICATION    INFORMATION FORM       Tricyclic Antidepressants Protocol Passed - 9/3/2019  1:38 AM        Passed - Blood pressure under 140/90 in past 12 months     BP Readings from Last 3 Encounters:   08/26/19 130/82   07/24/19 124/78   05/13/19 120/74                 Passed - PHQ-9 score less than 5 in past 6 months     Please review last PHQ-9 score.   PHQ-9 SCORE 7/25/2017 3/28/2018 6/5/2019   PHQ-9 Total Score MyChart - 1 (Minimal depression) 1 (Minimal depression)   PHQ-9 Total Score 0 1 1     NATHALIA-7 SCORE 7/24/2019   Total Score 1             Passed - Medication is active on med list        Passed - Patient is age 18 or older        Passed - Recent (6 mo) or future (30 days) visit within the authorizing provider's specialty     Patient had office visit in the last 6 months or has a visit in the next 30 days with authorizing provider or within the authorizing provider's specialty.  See \"Patient Info\" tab in inbasket, or \"Choose Columns\" in Meds & Orders section of the refill encounter.            venlafaxine (EFFEXOR-XR) 150 MG 24 hr capsule [Pharmacy Med Name: VENLAFAXINE  CAP 150MG ER]  Last Written Prescription Date:  6/5/2019  Last Fill Quantity: 180 capsule,  # refills: 0   Last office visit: 8/26/2019 with prescribing provider:  Leda   Future Office Visit:     180 capsule 0     Sig: FOR DIRECTIONS ON HOW TO   TAKE THIS MEDICINE, READ   THE ENCLOSED MEDICATION    INFORMATION FORM       Serotonin-Norepinephrine Reuptake Inhibitors  Passed - 9/3/2019  1:38 AM        Passed - Blood pressure " "under 140/90 in past 12 months     BP Readings from Last 3 Encounters:   08/26/19 130/82   07/24/19 124/78   05/13/19 120/74                 Passed - PHQ-9 score of less than 5 in past 6 months     Please review last PHQ-9 score.   PHQ-9 SCORE 7/25/2017 3/28/2018 6/5/2019   PHQ-9 Total Score MyChart - 1 (Minimal depression) 1 (Minimal depression)   PHQ-9 Total Score 0 1 1     NATHALIA-7 SCORE 7/24/2019   Total Score 1             Passed - Medication is active on med list        Passed - Patient is age 18 or older        Passed - Normal serum creatinine on file in past 12 months     Recent Labs   Lab Test 05/13/19  1125   CR 0.74             Passed - Recent (6 mo) or future (30 days) visit within the authorizing provider's specialty     Patient had office visit in the last 6 months or has a visit in the next 30 days with authorizing provider or within the authorizing provider's specialty.  See \"Patient Info\" tab in inbasket, or \"Choose Columns\" in Meds & Orders section of the refill encounter.               "

## 2019-09-04 RX ORDER — VENLAFAXINE HYDROCHLORIDE 150 MG/1
CAPSULE, EXTENDED RELEASE ORAL
Qty: 180 CAPSULE | Refills: 1 | Status: SHIPPED | OUTPATIENT
Start: 2019-09-04 | End: 2020-02-04

## 2019-09-04 RX ORDER — DOXEPIN HYDROCHLORIDE 25 MG/1
CAPSULE ORAL
Qty: 270 CAPSULE | Refills: 1 | Status: SHIPPED | OUTPATIENT
Start: 2019-09-04 | End: 2020-02-04

## 2019-09-04 NOTE — TELEPHONE ENCOUNTER
Prescription approved per [facility] Refill Protocol for 6 months of refills since last appointment, which was 8/26/2019.

## 2019-10-13 ENCOUNTER — MYC REFILL (OUTPATIENT)
Dept: FAMILY MEDICINE | Facility: CLINIC | Age: 69
End: 2019-10-13

## 2019-10-13 DIAGNOSIS — F90.0 ATTENTION DEFICIT HYPERACTIVITY DISORDER (ADHD), PREDOMINANTLY INATTENTIVE TYPE: ICD-10-CM

## 2019-10-16 RX ORDER — LISDEXAMFETAMINE DIMESYLATE 70 MG/1
70 CAPSULE ORAL DAILY
Qty: 30 CAPSULE | Refills: 0 | Status: SHIPPED | OUTPATIENT
Start: 2019-10-16 | End: 2019-12-20

## 2019-10-16 NOTE — TELEPHONE ENCOUNTER
[place] reviewed:  reviewed- no concerns    [place]:  [email or web address]    Routing refill request to provider for review/approval because:  Drug not on the [facility] refill protocol     Requesting 90 days

## 2019-10-28 ENCOUNTER — HEALTH MAINTENANCE LETTER (OUTPATIENT)
Age: 69
End: 2019-10-28

## 2019-11-30 DIAGNOSIS — E78.5 HYPERLIPIDEMIA LDL GOAL <100: ICD-10-CM

## 2019-11-30 NOTE — TELEPHONE ENCOUNTER
"Requested Prescriptions   Pending Prescriptions Disp Refills     atorvastatin (LIPITOR) 80 MG tablet [Pharmacy Med Name: ATORVASTATIN TAB 80MG]    Last Written Prescription Date:  05/13/2019  Last Fill Quantity: 90 tablet,  # refills: 1   Last office visit: 8/26/2019 with prescribing provider:  Leda   Future Office Visit:     90 tablet 1     Sig: TAKE 1 TABLET DAILY       Statins Protocol Passed - 11/30/2019  3:51 AM        Passed - LDL on file in past 12 months     Recent Labs   Lab Test 08/26/19  1021   LDL 85             Passed - No abnormal creatine kinase in past 12 months     No lab results found.             Passed - Recent (12 mo) or future (30 days) visit within the authorizing provider's specialty     Patient has had an office visit with the authorizing provider or a provider within the authorizing providers department within the previous 12 mos or has a future within next 30 days. See \"Patient Info\" tab in inbasket, or \"Choose Columns\" in Meds & Orders section of the refill encounter.              Passed - Medication is active on med list        Passed - Patient is age 18 or older           "

## 2019-12-03 RX ORDER — ATORVASTATIN CALCIUM 80 MG/1
TABLET, FILM COATED ORAL
Qty: 90 TABLET | Refills: 1 | Status: SHIPPED | OUTPATIENT
Start: 2019-12-03 | End: 2020-05-14

## 2020-02-04 ENCOUNTER — OFFICE VISIT (OUTPATIENT)
Dept: FAMILY MEDICINE | Facility: CLINIC | Age: 70
End: 2020-02-04
Payer: MEDICARE

## 2020-02-04 VITALS
WEIGHT: 195 LBS | BODY MASS INDEX: 29.55 KG/M2 | TEMPERATURE: 96.7 F | RESPIRATION RATE: 16 BRPM | HEIGHT: 68 IN | SYSTOLIC BLOOD PRESSURE: 122 MMHG | HEART RATE: 71 BPM | OXYGEN SATURATION: 100 % | DIASTOLIC BLOOD PRESSURE: 76 MMHG

## 2020-02-04 DIAGNOSIS — R53.83 FATIGUE, UNSPECIFIED TYPE: ICD-10-CM

## 2020-02-04 DIAGNOSIS — F51.01 PRIMARY INSOMNIA: ICD-10-CM

## 2020-02-04 DIAGNOSIS — E78.5 HYPERLIPIDEMIA LDL GOAL <100: Primary | ICD-10-CM

## 2020-02-04 DIAGNOSIS — K21.9 GASTROESOPHAGEAL REFLUX DISEASE WITHOUT ESOPHAGITIS: ICD-10-CM

## 2020-02-04 DIAGNOSIS — I10 ESSENTIAL HYPERTENSION WITH GOAL BLOOD PRESSURE LESS THAN 140/90: Chronic | ICD-10-CM

## 2020-02-04 DIAGNOSIS — F41.9 ANXIETY: ICD-10-CM

## 2020-02-04 DIAGNOSIS — N40.0 BENIGN PROSTATIC HYPERPLASIA WITHOUT LOWER URINARY TRACT SYMPTOMS: ICD-10-CM

## 2020-02-04 DIAGNOSIS — F33.42 MAJOR DEPRESSIVE DISORDER, RECURRENT EPISODE, IN FULL REMISSION (H): ICD-10-CM

## 2020-02-04 LAB
ALBUMIN UR-MCNC: NEGATIVE MG/DL
APPEARANCE UR: CLEAR
BASOPHILS # BLD AUTO: 0 10E9/L (ref 0–0.2)
BASOPHILS NFR BLD AUTO: 0.2 %
BILIRUB UR QL STRIP: NEGATIVE
COLOR UR AUTO: YELLOW
DIFFERENTIAL METHOD BLD: NORMAL
EOSINOPHIL # BLD AUTO: 0.1 10E9/L (ref 0–0.7)
EOSINOPHIL NFR BLD AUTO: 2 %
ERYTHROCYTE [DISTWIDTH] IN BLOOD BY AUTOMATED COUNT: 13.1 % (ref 10–15)
FOLATE SERPL-MCNC: 17 NG/ML
GLUCOSE UR STRIP-MCNC: NEGATIVE MG/DL
HCT VFR BLD AUTO: 42.3 % (ref 40–53)
HGB BLD-MCNC: 13.9 G/DL (ref 13.3–17.7)
HGB UR QL STRIP: NEGATIVE
KETONES UR STRIP-MCNC: NEGATIVE MG/DL
LEUKOCYTE ESTERASE UR QL STRIP: NEGATIVE
LYMPHOCYTES # BLD AUTO: 0.8 10E9/L (ref 0.8–5.3)
LYMPHOCYTES NFR BLD AUTO: 15.4 %
MCH RBC QN AUTO: 30 PG (ref 26.5–33)
MCHC RBC AUTO-ENTMCNC: 32.9 G/DL (ref 31.5–36.5)
MCV RBC AUTO: 91 FL (ref 78–100)
MONOCYTES # BLD AUTO: 0.5 10E9/L (ref 0–1.3)
MONOCYTES NFR BLD AUTO: 8.6 %
NEUTROPHILS # BLD AUTO: 4 10E9/L (ref 1.6–8.3)
NEUTROPHILS NFR BLD AUTO: 73.8 %
NITRATE UR QL: NEGATIVE
PH UR STRIP: 6.5 PH (ref 5–7)
PLATELET # BLD AUTO: 211 10E9/L (ref 150–450)
RBC # BLD AUTO: 4.64 10E12/L (ref 4.4–5.9)
RBC #/AREA URNS AUTO: NORMAL /HPF
SOURCE: NORMAL
SP GR UR STRIP: 1.01 (ref 1–1.03)
UROBILINOGEN UR STRIP-ACNC: 0.2 EU/DL (ref 0.2–1)
VIT B12 SERPL-MCNC: 407 PG/ML (ref 193–986)
WBC # BLD AUTO: 5.4 10E9/L (ref 4–11)
WBC #/AREA URNS AUTO: NORMAL /HPF

## 2020-02-04 PROCEDURE — 82746 ASSAY OF FOLIC ACID SERUM: CPT | Performed by: FAMILY MEDICINE

## 2020-02-04 PROCEDURE — 80061 LIPID PANEL: CPT | Performed by: FAMILY MEDICINE

## 2020-02-04 PROCEDURE — 80050 GENERAL HEALTH PANEL: CPT | Performed by: FAMILY MEDICINE

## 2020-02-04 PROCEDURE — 82607 VITAMIN B-12: CPT | Performed by: FAMILY MEDICINE

## 2020-02-04 PROCEDURE — 36415 COLL VENOUS BLD VENIPUNCTURE: CPT | Performed by: FAMILY MEDICINE

## 2020-02-04 PROCEDURE — 99214 OFFICE O/P EST MOD 30 MIN: CPT | Performed by: FAMILY MEDICINE

## 2020-02-04 PROCEDURE — 81001 URINALYSIS AUTO W/SCOPE: CPT | Performed by: FAMILY MEDICINE

## 2020-02-04 RX ORDER — VENLAFAXINE HYDROCHLORIDE 150 MG/1
CAPSULE, EXTENDED RELEASE ORAL
Qty: 180 CAPSULE | Refills: 0 | Status: SHIPPED | OUTPATIENT
Start: 2020-02-04 | End: 2020-03-02

## 2020-02-04 RX ORDER — DOXEPIN HYDROCHLORIDE 25 MG/1
75 CAPSULE ORAL AT BEDTIME
Qty: 270 CAPSULE | Refills: 0 | Status: SHIPPED | OUTPATIENT
Start: 2020-02-04 | End: 2020-03-02

## 2020-02-04 RX ORDER — LISINOPRIL 10 MG/1
10 TABLET ORAL DAILY
Qty: 90 TABLET | Refills: 0 | Status: SHIPPED | OUTPATIENT
Start: 2020-02-04 | End: 2020-03-02

## 2020-02-04 RX ORDER — DUTASTERIDE 0.5 MG/1
CAPSULE, LIQUID FILLED ORAL
Qty: 90 CAPSULE | Refills: 0 | Status: SHIPPED | OUTPATIENT
Start: 2020-02-04 | End: 2020-03-02

## 2020-02-04 ASSESSMENT — PATIENT HEALTH QUESTIONNAIRE - PHQ9
10. IF YOU CHECKED OFF ANY PROBLEMS, HOW DIFFICULT HAVE THESE PROBLEMS MADE IT FOR YOU TO DO YOUR WORK, TAKE CARE OF THINGS AT HOME, OR GET ALONG WITH OTHER PEOPLE: SOMEWHAT DIFFICULT
SUM OF ALL RESPONSES TO PHQ QUESTIONS 1-9: 2
SUM OF ALL RESPONSES TO PHQ QUESTIONS 1-9: 2

## 2020-02-04 ASSESSMENT — ACTIVITIES OF DAILY LIVING (ADL): CURRENT_FUNCTION: NO ASSISTANCE NEEDED

## 2020-02-04 ASSESSMENT — MIFFLIN-ST. JEOR: SCORE: 1619.01

## 2020-02-04 NOTE — PROGRESS NOTES
Subjective     Stuart Moran is a 70 year old male who presents to clinic today for the following health issues:    HPI   Dizziness      Duration: Months    Description   Feeling faint:  YES  Feeling like the surroundings are moving: no   Loss of consciousness or falls: no     Intensity:  mild    Accompanying signs and symptoms:   Nausea/vomitting: no   Palpitations: no   Weakness in arms or legs: no   Vision or speech changes: no   Ringing in ears (Tinnitus): no   Hearing loss related to dizziness: no   Other (fevers/chills/sweating/dyspnea): no     History (similar episodes/head trauma/previous evaluation/recent bleeding): None    Precipitating or alleviating factors (new meds/chemicals): None  Worse with activity/head movement: no     Therapies tried and outcome: None    cough      Duration: Months    Description (location/character/radiation): Dry cough slowly improving    Intensity:  mild    Accompanying signs and symptoms: None    History (similar episodes/previous evaluation): None    Precipitating or alleviating factors: None    Therapies tried and outcome: None     Fatigue      Duration: Months    Description (location/character/radiation): Diffuse fatigue and has been getting virtually no exercise.    Intensity:  moderate    Accompanying signs and symptoms: Weight gain    History (similar episodes/previous evaluation): None    Precipitating or alleviating factors: None    Therapies tried and outcome: None       Patient Active Problem List   Diagnosis     Hypertension goal BP (blood pressure) < 140/90     ADHD (attention deficit hyperactivity disorder)     Depression, major, recurrent, in complete remission (H)     Insomnia     Hyperlipidemia LDL goal <100     BPH (benign prostatic hyperplasia)     Spinal stenosis of cervical region     ACP (advance care planning)     Anxiety     Back muscle spasm     Gastroesophageal reflux disease without esophagitis     Screening for prostate cancer     Mandibular  "hypoplasia     Skin tag     Past Surgical History:   Procedure Laterality Date     APPENDECTOMY       CARPAL TUNNEL RELEASE RT/LT      bilateral     CATARACT IOL, RT/LT Bilateral 2016     COSMETIC OSTEOTOMY SAGITTAL SPLIT WITH MANDIBLE ADVANCEMENT N/A 11/29/2017    Procedure: COSMETIC OSTEOTOMY SAGITTAL SPLIT WITH MANDIBLE ADVANCEMENT;  (COSMETIC) SAGITTAL SPLIT OSTEOTOMY ;  Surgeon: Daniel Hyde DDS;  Location: [facility]     EYE SURGERY      bilateral strabismus     HERNIA REPAIR      umbilical     ORTHOPEDIC SURGERY      left knee meniscus       Social History     Tobacco Use     Smoking status: Never Smoker     Smokeless tobacco: Never Used   Substance Use Topics     Alcohol use: No     Family History   Problem Relation Age of Onset     Alzheimer Disease Father      C.A.D. Mother         PTCA     Thyroid Disease Mother         grave's disease     Heart Disease Mother         aortic valve replacement     Thyroid Disease Sister         grave's     Thyroid Disease Sister         graves             Reviewed and updated as needed this visit by Provider         Review of Systems   ROS COMP: Constitutional, HEENT, cardiovascular, pulmonary, gi and gu systems are negative, except as otherwise noted.      Objective    /76   Pulse 71   Temp 96.7  F (35.9  C) (Tympanic)   Resp 16   Ht 1.727 m (5' 8\")   Wt 88.5 kg (195 lb)   SpO2 100%   BMI 29.65 kg/m    Body mass index is 29.65 kg/m .  Physical Exam   GENERAL APPEARANCE: healthy, alert and no distress  EYES: Eyes grossly normal to inspection, PERRL and conjunctivae and sclerae normal  HENT: ear canals and TM's normal and nose and mouth without ulcers or lesions  NECK: no adenopathy and no asymmetry, masses, or scars  RESP: lungs clear to auscultation - no rales, rhonchi or wheezes  CV: regular rates and rhythm, normal S1 S2, no S3 or S4 and no murmur, click or rub  LYMPHATICS: no cervical adenopathy  MS: extremities normal- no gross deformities noted  NEURO: " Normal strength and tone, mentation intact, speech normal, cranial nerves 2-12 intact and Romberg negative            Assessment & Plan       ICD-10-CM    1. Hyperlipidemia LDL goal <100 E78.5 Comprehensive metabolic panel     Lipid Profile   2. Anxiety F41.9    3. Gastroesophageal reflux disease without esophagitis K21.9 CBC with platelets differential   4. Fatigue, unspecified type R53.83 UA with Microscopic     TSH     Vitamin B12     Folate   5. Essential hypertension with goal blood pressure less than 140/90 I10 lisinopril (PRINIVIL/ZESTRIL) 10 MG tablet   6. Benign prostatic hyperplasia without lower urinary tract symptoms N40.0 dutasteride (AVODART) 0.5 MG capsule   7. Primary insomnia F51.01 doxepin (SINEQUAN) 25 MG capsule   8. Major depressive disorder, recurrent episode, in full remission (H) F33.42 venlafaxine (EFFEXOR-XR) 150 MG 24 hr capsule          Patient Instructions   I refilled the patient's medications as noted.  His physical exam today was nonfocal in terms of his complaints.  We will draw tests as noted.  He is due in 3 months for his annual wellness exam and will follow-up at that time assuming that his tests come back normal.  Recheck otherwise will be as needed.      Return in about 3 months (around 5/4/2020) for wellness exam.    Lucas Tompkins MD  [facility]

## 2020-02-04 NOTE — PROGRESS NOTES
"SUBJECTIVE:   Stuart Moran is a 70 year old male who presents for Preventive Visit.  Are you in the first 12 months of your Medicare coverage?  No    Healthy Habits:     In general, how would you rate your overall health?  Fair    Frequency of exercise:  2-3 days/week    Duration of exercise:  30-45 minutes    Do you usually eat at least 4 servings of fruit and vegetables a day, include whole grains    & fiber and avoid regularly eating high fat or \"junk\" foods?  Yes    Taking medications regularly:  Yes    Medication side effects:  None    Ability to successfully perform activities of daily living:  No assistance needed    Home Safety:  Lack of grab bars in the bathroom    Hearing Impairment:  Difficulty following a conversation in a noisy restaurant or crowded room    In the past 6 months, have you been bothered by leaking of urine?  No    In general, how would you rate your overall mental or emotional health?  Good      PHQ-2 Total Score: 1    Additional concerns today:  No    Do you feel safe in your environment? Yes    Have you ever done Advance Care Planning? (For example, a Health Directive, POLST, or a discussion with a medical provider or your loved ones about your wishes): Yes, advance care planning is on file.      Fall risk  Fallen 2 or more times in the past year?: No  Any fall with injury in the past year?: No    Cognitive Screening   1) Repeat 3 items (Leader, Season, Table)    2) Clock draw: NORMAL  3) 3 item recall: Recalls 3 objects  Results: 3 items recalled: COGNITIVE IMPAIRMENT LESS LIKELY    Mini-CogTM Copyright DOUGLAS Juan. Licensed by the author for use in [facility]; reprinted with permission (main@.[facility]). All rights reserved.          Reviewed and updated as needed this visit by clinical staff  Tobacco  Allergies  Meds  Med Hx  Surg Hx  Fam Hx  Soc Hx        Reviewed and updated as needed this visit by Provider        Social History     Tobacco Use     Smoking status: " Never Smoker     Smokeless tobacco: Never Used   Substance Use Topics     Alcohol use: No     If you drink alcohol do you typically have >3 drinks per day or >7 drinks per week? No    Alcohol Use 2/4/2020   Prescreen: >3 drinks/day or >7 drinks/week? Not Applicable   Prescreen: >3 drinks/day or >7 drinks/week? -     persistant cough, ongoing long time    Current providers sharing in care for this patient include:   Patient Care Team:  Lucas Tompkins MD as PCP - General (Family Practice)  Lucas Tompkins MD as Assigned PCP    The following health maintenance items are reviewed in Epic and correct as of today:  Health Maintenance   Topic Date Due     AORTIC ANEURYSM SCREENING (SYSTEM ASSIGNED)  01/27/2015     PNEUMOCOCCAL IMMUNIZATION 65+ LOW/MEDIUM RISK (2 of 2 - PPSV23) 10/15/2016     ADVANCE CARE PLANNING  08/22/2018     PHQ-9  12/05/2019     COLONOSCOPY  03/02/2020     MEDICARE ANNUAL WELLNESS VISIT  05/13/2020     FALL RISK ASSESSMENT  05/13/2020     NATHALIA ASSESSMENT  07/24/2020     LIPID  08/26/2020     DTAP/TDAP/TD IMMUNIZATION (3 - Td) 07/25/2027     HEPATITIS C SCREENING  Completed     DEPRESSION ACTION PLAN  Completed     INFLUENZA VACCINE  Completed     ZOSTER IMMUNIZATION  Completed     IPV IMMUNIZATION  Aged Out     MENINGITIS IMMUNIZATION  Aged Out     Patient Active Problem List   Diagnosis     Hypertension goal BP (blood pressure) < 140/90     ADHD (attention deficit hyperactivity disorder)     Depression, major, recurrent, in complete remission (H)     Insomnia     Hyperlipidemia LDL goal <100     BPH (benign prostatic hyperplasia)     Spinal stenosis of cervical region     ACP (advance care planning)     Anxiety     Back muscle spasm     Gastroesophageal reflux disease without esophagitis     Screening for prostate cancer     Mandibular hypoplasia     Skin tag     Past Surgical History:   Procedure Laterality Date     APPENDECTOMY       CARPAL TUNNEL RELEASE RT/LT      bilateral      "CATARACT IOL, RT/LT Bilateral 2016     COSMETIC OSTEOTOMY SAGITTAL SPLIT WITH MANDIBLE ADVANCEMENT N/A 2017    Procedure: COSMETIC OSTEOTOMY SAGITTAL SPLIT WITH MANDIBLE ADVANCEMENT;  (COSMETIC) SAGITTAL SPLIT OSTEOTOMY ;  Surgeon: Daniel Hyde DDS;  Location: [facility] OR     EYE SURGERY      bilateral strabismus     HERNIA REPAIR      umbilical     ORTHOPEDIC SURGERY      left knee meniscus       Social History     Tobacco Use     Smoking status: Never Smoker     Smokeless tobacco: Never Used   Substance Use Topics     Alcohol use: No     Family History   Problem Relation Age of Onset     Alzheimer Disease Father      C.A.D. Mother         PTCA     Thyroid Disease Mother         grave's disease     Heart Disease Mother         aortic valve replacement     Thyroid Disease Sister         grave's     Thyroid Disease Sister         graves         {Decision Support (Optional):253160}    Review of Systems  Constitutional, HEENT, cardiovascular, pulmonary, GI, , musculoskeletal, neuro, skin, endocrine and psych systems are negative, except as otherwise noted.  The patient is complaining of chronic fatigue.  He is also had a nagging cough.    OBJECTIVE:   /76   Pulse 71   Temp 96.7  F (35.9  C) (Tympanic)   Resp 16   Ht 1.727 m (5' 8\")   Wt 88.5 kg (195 lb)   SpO2 100%   BMI 29.65 kg/m   Estimated body mass index is 29.65 kg/m  as calculated from the following:    Height as of this encounter: 1.727 m (5' 8\").    Weight as of this encounter: 88.5 kg (195 lb).  Physical Exam  {Exam (Optional) :494481}    {Diagnostic Test Results (Optional):954331::\"Diagnostic Test Results:\",\"Labs reviewed in Epic\"}    ASSESSMENT / PLAN:   {Diag Picklist:884860}    COUNSELING:  {Medicare Counselin}    Estimated body mass index is 29.65 kg/m  as calculated from the following:    Height as of this encounter: 1.727 m (5' 8\").    Weight as of this encounter: 88.5 kg (195 lb).    {Weight Management Plan (ACO) Complete if " BMI is abnormal-  Ages 18-64  BMI >24.9.  Age 65+ with BMI <23 or >30 (Optional):594361}     reports that he has never smoked. He has never used smokeless tobacco.  {Tobacco Cessation -- Complete if patient is a smoker (Optional):005009}    Appropriate preventive services were discussed with this patient, including applicable screening as appropriate for cardiovascular disease, diabetes, osteopenia/osteoporosis, and glaucoma.  As appropriate for age/gender, discussed screening for colorectal cancer, prostate cancer, breast cancer, and cervical cancer. Checklist reviewing preventive services available has been given to the patient.    Reviewed patients plan of care and provided an AVS. The {CarePlan:029845} for Stuart meets the Care Plan requirement. This Care Plan has been established and reviewed with the {PATIENT, FAMILY MEMBER, CAREGIVER:276555}.    Counseling Resources:  ATP IV Guidelines  Pooled Cohorts Equation Calculator  Breast Cancer Risk Calculator  FRAX Risk Assessment  ICSI Preventive Guidelines  Dietary Guidelines for Americans, 2010  [employer]'s MyPlate  ASA Prophylaxis  Lung CA Screening    Lucas Tompkins MD  [facility]    Identified Health Risks:  Answers for HPI/ROS submitted by the patient on 2/4/2020   Annual Exam:  If you checked off any problems, how difficult have these problems made it for you to do your work, take care of things at home, or get along with other people?: Somewhat difficult  PHQ9 TOTAL SCORE: 2

## 2020-02-04 NOTE — PATIENT INSTRUCTIONS
I refilled the patient's medications as noted.  His physical exam today was nonfocal in terms of his complaints.  We will draw tests as noted.  He is due in 3 months for his annual wellness exam and will follow-up at that time assuming that his tests come back normal.  Recheck otherwise will be as needed.

## 2020-02-05 LAB
ALBUMIN SERPL-MCNC: 4.4 G/DL (ref 3.4–5)
ALP SERPL-CCNC: 71 U/L (ref 40–150)
ALT SERPL W P-5'-P-CCNC: 48 U/L (ref 0–70)
ANION GAP SERPL CALCULATED.3IONS-SCNC: 6 MMOL/L (ref 3–14)
AST SERPL W P-5'-P-CCNC: 35 U/L (ref 0–45)
BILIRUB SERPL-MCNC: 0.8 MG/DL (ref 0.2–1.3)
BUN SERPL-MCNC: 12 MG/DL (ref 7–30)
CALCIUM SERPL-MCNC: 8.9 MG/DL (ref 8.5–10.1)
CHLORIDE SERPL-SCNC: 103 MMOL/L (ref 94–109)
CHOLEST SERPL-MCNC: 170 MG/DL
CO2 SERPL-SCNC: 26 MMOL/L (ref 20–32)
CREAT SERPL-MCNC: 0.79 MG/DL (ref 0.66–1.25)
GFR SERPL CREATININE-BSD FRML MDRD: >90 ML/MIN/{1.73_M2}
GLUCOSE SERPL-MCNC: 86 MG/DL (ref 70–99)
HDLC SERPL-MCNC: 45 MG/DL
LDLC SERPL CALC-MCNC: 102 MG/DL
NONHDLC SERPL-MCNC: 125 MG/DL
POTASSIUM SERPL-SCNC: 3.9 MMOL/L (ref 3.4–5.3)
PROT SERPL-MCNC: 7.4 G/DL (ref 6.8–8.8)
SODIUM SERPL-SCNC: 135 MMOL/L (ref 133–144)
TRIGL SERPL-MCNC: 116 MG/DL
TSH SERPL DL<=0.005 MIU/L-ACNC: 5.28 MU/L (ref 0.4–4)

## 2020-05-13 ENCOUNTER — VIRTUAL VISIT (OUTPATIENT)
Dept: FAMILY MEDICINE | Facility: CLINIC | Age: 70
End: 2020-05-13
Payer: MEDICARE

## 2020-05-13 DIAGNOSIS — Z00.00 ROUTINE MEDICAL EXAM: Primary | ICD-10-CM

## 2020-05-13 DIAGNOSIS — F33.42 DEPRESSION, MAJOR, RECURRENT, IN COMPLETE REMISSION (H): ICD-10-CM

## 2020-05-13 DIAGNOSIS — R94.6 ABNORMAL FINDING ON THYROID FUNCTION TEST: ICD-10-CM

## 2020-05-13 DIAGNOSIS — I10 HYPERTENSION GOAL BP (BLOOD PRESSURE) < 140/90: ICD-10-CM

## 2020-05-13 DIAGNOSIS — F90.0 ATTENTION DEFICIT HYPERACTIVITY DISORDER (ADHD), PREDOMINANTLY INATTENTIVE TYPE: ICD-10-CM

## 2020-05-13 DIAGNOSIS — K21.9 GASTROESOPHAGEAL REFLUX DISEASE WITHOUT ESOPHAGITIS: ICD-10-CM

## 2020-05-13 DIAGNOSIS — E78.5 HYPERLIPIDEMIA LDL GOAL <100: ICD-10-CM

## 2020-05-13 DIAGNOSIS — F41.9 ANXIETY: ICD-10-CM

## 2020-05-13 PROCEDURE — G0439 PPPS, SUBSEQ VISIT: HCPCS | Performed by: FAMILY MEDICINE

## 2020-05-13 PROCEDURE — 99213 OFFICE O/P EST LOW 20 MIN: CPT | Mod: 25 | Performed by: FAMILY MEDICINE

## 2020-05-13 RX ORDER — LISDEXAMFETAMINE DIMESYLATE 70 MG/1
70 CAPSULE ORAL DAILY
Qty: 90 CAPSULE | Refills: 0 | Status: SHIPPED | OUTPATIENT
Start: 2020-05-13 | End: 2020-09-19

## 2020-05-13 NOTE — PROGRESS NOTES
"Stuart Moran is a 70 year old male who is being evaluated via a billable video visit.      The patient has been notified of following:     \"This video visit will be conducted via a call between you and your physician/provider. We have found that certain health care needs can be provided without the need for an in-person physical exam.  This service lets us provide the care you need with a video conversation.  If a prescription is necessary we can send it directly to your pharmacy.  If lab work is needed we can place an order for that and you can then stop by our lab to have the test done at a later time.    Video visits are billed at different rates depending on your insurance coverage.  Please reach out to your insurance provider with any questions.    If during the course of the call the physician/provider feels a video visit is not appropriate, you will not be charged for this service.\"    Patient has given verbal consent for Video visit? Yes    How would you like to obtain your AVS? [place]    Patient would like the video invitation sent by: Send to e-mail at: [email or web address]    Will anyone else be joining your video visit? No    Subjective     Stuart Moran is a 70 year old male who presents today via video visit for the following health issues:    HPI  Annual Wellness Visit    Are you in the first 12 months of your Medicare Part B coverage?  No    Physical Health:    In general, how would you rate your overall physical health? good    Outside of work, how many days during the week do you exercise?6-7 days/week    Outside of work, approximately how many minutes a day do you exercise?45-60 minutes    If you drink alcohol do you typically have >3 drinks per day or >7 drinks per week? No    Do you usually eat at least 4 servings of fruit and vegetables a day, include whole grains & fiber and avoid regularly eating high fat or \"junk\" foods? Yes    Do you have any problems taking medications regularly? " YES-sometimes forgets to refill meds right away    Do you have any side effects from medications? None    Needs assistance for the following daily activities: no assistance needed    Which of the following safety concerns are present in your home?  none identified     Hearing impairment: No but has ringing in his ears    In the past 6 months, have you been bothered by leaking of urine? no    Mental Health:    In general, how would you rate your overall mental or emotional health? good  PHQ-2 Score:      Do you feel safe in your environment? Yes    Have you ever done Advance Care Planning? (For example, a Health Directive, POLST, or a discussion with a medical provider or your loved ones about your wishes)? Yes, advance care planning is on file.    Fall risk:  Fallen 2 or more times in the past year?: No  Any fall with injury in the past year?: No    Cognitive Screenin) Repeat 3 items (Leader, Season, Table)    2) Clock draw: NORMAL  3) 3 item recall: Recalls 3 objects  Results: NORMAL clock, 1-2 items recalled: COGNITIVE IMPAIRMENT LESS LIKELY    Mini-CogTM Copyright S Yess[employer]. Licensed by the author for use in [employer]; reprinted with permission (main@[facility]). All rights reserved.      Do you have sleep apnea, excessive snoring or daytime drowsiness?: no    Current providers sharing in care for this patient include:   Patient Care Team:  Lucas Tompkins MD as PCP - General (Family Practice)  Lucas Tompkins MD as Assigned PCP    Preventive Health Recommendations  See your health care provider every year to    Review health changes.     Discuss preventive care.      Review your medicines if your doctor has prescribed any.    Talk with your health care provider about whether you should have a test to screen for prostate cancer (PSA).    Every 3 years, have a diabetes test (fasting glucose). If you are at risk for diabetes, you should have this test more often.    Every 5 years,  have a cholesterol test. Have this test more often if you are at risk for high cholesterol or heart disease.     Every 10 years, have a colonoscopy. Or, have a yearly FIT test (stool test). These exams will check for colon cancer.    Talk to with your health care provider about screening for Abdominal Aortic Aneurysm if you have a family history of AAA or have a history of smoking.    Shots:     Get a flu shot each year.     Get a tetanus shot every 10 years.     Talk to your doctor about your pneumonia vaccines. There are now two you should receive - Pneumovax (PPSV 23) and Prevnar (PCV 13).    Talk to your pharmacist about a shingles vaccine.     Talk to your doctor about the hepatitis B vaccine.    Nutrition:     Eat at least 5 servings of fruits and vegetables each day.     Eat whole-grain bread, whole-wheat pasta and brown rice instead of white grains and rice.     Get adequate Calcium and Vitamin D.     Lifestyle    Exercise for at least 150 minutes a week (30 minutes a day, 5 days a week). This will help you control your weight and prevent disease.     Limit alcohol to one drink per day.     No smoking.     Wear sunscreen to prevent skin cancer.     See your dentist every six months for an exam and cleaning.     See your eye doctor every 1 to 2 years to screen for conditions such as glaucoma, macular degeneration and cataracts.    Personalized Prevention Plan  You are due for the preventive services outlined below.  Your care team is available to assist you in scheduling these services.  If you have already completed any of these items, please share that information with your care team to update in your medical record.  Health Maintenance Due   Topic Date Due     AORTIC ANEURYSM SCREENING (SYSTEM ASSIGNED)  01/27/2015     Pneumococcal Vaccine (2 of 2 - PPSV23) 10/15/2016     Discuss Advance Care Planning  08/22/2018     Colorectal Cancer Screening  03/02/2020     Annual Wellness Visit  05/13/2020                     Video Start Time: 9:35    Medication Followup of Vyvanse    Taking Medication as prescribed: yes    Side Effects:  None    Medication Helping Symptoms:  yes       Patient Active Problem List   Diagnosis     Hypertension goal BP (blood pressure) < 140/90     ADHD (attention deficit hyperactivity disorder)     Depression, major, recurrent, in complete remission (H)     Insomnia     Hyperlipidemia LDL goal <100     BPH (benign prostatic hyperplasia)     Spinal stenosis of cervical region     ACP (advance care planning)     Anxiety     Back muscle spasm     Gastroesophageal reflux disease without esophagitis     Screening for prostate cancer     Mandibular hypoplasia     Skin tag     Past Surgical History:   Procedure Laterality Date     APPENDECTOMY       CARPAL TUNNEL RELEASE RT/LT      bilateral     CATARACT IOL, RT/LT Bilateral 2016     COSMETIC OSTEOTOMY SAGITTAL SPLIT WITH MANDIBLE ADVANCEMENT N/A 11/29/2017    Procedure: COSMETIC OSTEOTOMY SAGITTAL SPLIT WITH MANDIBLE ADVANCEMENT;  (COSMETIC) SAGITTAL SPLIT OSTEOTOMY ;  Surgeon: Daniel Hyde DDS;  Location: [facility] OR     EYE SURGERY      bilateral strabismus     HERNIA REPAIR      umbilical     ORTHOPEDIC SURGERY      left knee meniscus       Social History     Tobacco Use     Smoking status: Never Smoker     Smokeless tobacco: Never Used   Substance Use Topics     Alcohol use: No     Family History   Problem Relation Age of Onset     Alzheimer Disease Father      C.A.D. Mother         PTCA     Thyroid Disease Mother         grave's disease     Heart Disease Mother         aortic valve replacement     Thyroid Disease Sister         grave's     Thyroid Disease Sister         graves           Reviewed and updated as needed this visit by Provider         Review of Systems   Constitutional, HEENT, cardiovascular, pulmonary, GI, , musculoskeletal, neuro, skin, endocrine and psych systems are negative, except as otherwise noted.      Objective   "  There were no vitals taken for this visit.  Estimated body mass index is 29.65 kg/m  as calculated from the following:    Height as of 2/4/20: 1.727 m (5' 8\").    Weight as of 2/4/20: 88.5 kg (195 lb).  Physical Exam     GENERAL: Healthy, alert and no distress  EYES: Eyes grossly normal to inspection.  No discharge or erythema, or obvious scleral/conjunctival abnormalities.  RESP: No audible wheeze, cough, or visible cyanosis.  No visible retractions or increased work of breathing.    SKIN: Visible skin clear. No significant rash, abnormal pigmentation or lesions.  NEURO: Cranial nerves grossly intact.  Mentation and speech appropriate for age.  PSYCH: Mentation appears normal, affect normal/bright, judgement and insight intact, normal speech and appearance well-groomed.              Assessment & Plan       ICD-10-CM    1. Routine medical exam  Z00.00    2. Attention deficit hyperactivity disorder (ADHD), predominantly inattentive type  F90.0 lisdexamfetamine (VYVANSE) 70 MG capsule   3. Abnormal finding on thyroid function test  R94.6 TSH     T4 FREE   4. Gastroesophageal reflux disease without esophagitis  K21.9    5. Anxiety  F41.9    6. Hypertension goal BP (blood pressure) < 140/90  I10    7. Depression, major, recurrent, in complete remission (H)  F33.42    8. Hyperlipidemia LDL goal <100  E78.5         BMI:   Estimated body mass index is 29.65 kg/m  as calculated from the following:    Height as of 2/4/20: 1.727 m (5' 8\").    Weight as of 2/4/20: 88.5 kg (195 lb).           MEDICATIONS:  Continue current medications without change  FURTHER TESTING:       - thyroid tests  There are no Patient Instructions on file for this visit.    No follow-ups on file.    Lucas Tompkins MD  [facility]      Video-Visit Details    Type of service:  Video Visit    Video End Time:10:05    Originating Location (pt. Location): Home    Distant Location (provider location):  [facility]     Platform used for Video Visit: Allen    No follow-ups on file.       Lucas Tompkins MD

## 2020-05-14 DIAGNOSIS — E78.5 HYPERLIPIDEMIA LDL GOAL <100: ICD-10-CM

## 2020-05-14 RX ORDER — ATORVASTATIN CALCIUM 80 MG/1
TABLET, FILM COATED ORAL
Qty: 90 TABLET | Refills: 3 | Status: SHIPPED | OUTPATIENT
Start: 2020-05-14 | End: 2021-05-07

## 2020-05-22 DIAGNOSIS — R94.6 ABNORMAL FINDING ON THYROID FUNCTION TEST: ICD-10-CM

## 2020-05-22 PROCEDURE — 36415 COLL VENOUS BLD VENIPUNCTURE: CPT | Performed by: FAMILY MEDICINE

## 2020-05-22 PROCEDURE — 84443 ASSAY THYROID STIM HORMONE: CPT | Performed by: FAMILY MEDICINE

## 2020-05-22 PROCEDURE — 84439 ASSAY OF FREE THYROXINE: CPT | Performed by: FAMILY MEDICINE

## 2020-05-23 LAB
T4 FREE SERPL-MCNC: 0.84 NG/DL (ref 0.76–1.46)
TSH SERPL DL<=0.005 MIU/L-ACNC: 5.17 MU/L (ref 0.4–4)

## 2020-05-28 ENCOUNTER — MYC MEDICAL ADVICE (OUTPATIENT)
Dept: FAMILY MEDICINE | Facility: CLINIC | Age: 70
End: 2020-05-28

## 2020-05-28 DIAGNOSIS — E03.9 HYPOTHYROIDISM, UNSPECIFIED TYPE: Primary | ICD-10-CM

## 2020-05-29 DIAGNOSIS — E03.9 HYPOTHYROIDISM, UNSPECIFIED TYPE: Primary | ICD-10-CM

## 2020-05-29 DIAGNOSIS — E03.9 HYPOTHYROIDISM, UNSPECIFIED TYPE: ICD-10-CM

## 2020-05-29 RX ORDER — LEVOTHYROXINE SODIUM 25 UG/1
TABLET ORAL
Qty: 90 TABLET | OUTPATIENT
Start: 2020-05-29

## 2020-05-29 RX ORDER — LEVOTHYROXINE SODIUM 25 UG/1
25 TABLET ORAL DAILY
Qty: 90 TABLET | Refills: 0 | Status: SHIPPED | OUTPATIENT
Start: 2020-05-29 | End: 2020-07-27

## 2020-05-29 RX ORDER — LEVOTHYROXINE SODIUM 25 UG/1
25 TABLET ORAL DAILY
Qty: 30 TABLET | Refills: 3 | Status: SHIPPED | OUTPATIENT
Start: 2020-05-29 | End: 2020-07-27

## 2020-05-29 NOTE — TELEPHONE ENCOUNTER
DUPLICATE--  See Leticiat TE from 5/28--  RX was sent to [facility] by [place].

## 2020-05-29 NOTE — TELEPHONE ENCOUNTER
Will start on low dose thyrooid replacement, sent to mail order pharmacy.  Will need follow up in 2 months.

## 2020-05-29 NOTE — TELEPHONE ENCOUNTER
Please see patient mychart.     Update on current symptoms and family history.     Last lab note (5/28) states to recheck TSH in 3 months

## 2020-05-29 NOTE — TELEPHONE ENCOUNTER
"Requested Prescriptions   Pending Prescriptions Disp Refills     levothyroxine (SYNTHROID/LEVOTHROID) 25 MCG tablet [Pharmacy Med Name: LEVOTHYROXINE 0.025MG (25MCG) TAB] 90 tablet      Sig: TAKE 1 TABLET(25 MCG) BY MOUTH DAILY       Thyroid Protocol Failed - 5/29/2020  9:31 AM        Failed - Normal TSH on file in past 12 months     Recent Labs   Lab Test 05/22/20  1040   TSH 5.17*              Passed - Patient is 12 years or older        Passed - Recent (12 mo) or future (30 days) visit within the authorizing provider's specialty     Patient has had an office visit with the authorizing provider or a provider within the authorizing providers department within the previous 12 mos or has a future within next 30 days. See \"Patient Info\" tab in inbasket, or \"Choose Columns\" in Meds & Orders section of the refill encounter.              Passed - Medication is active on med list             "

## 2020-06-01 ENCOUNTER — TELEPHONE (OUTPATIENT)
Dept: FAMILY MEDICINE | Facility: CLINIC | Age: 70
End: 2020-06-01

## 2020-06-01 DIAGNOSIS — F51.01 PRIMARY INSOMNIA: ICD-10-CM

## 2020-06-01 NOTE — TELEPHONE ENCOUNTER
doxepin (SINEQUAN) 25 MG capsule  If your patient is not in a titration period for their medication, please consider this med.

## 2020-06-02 ENCOUNTER — MYC MEDICAL ADVICE (OUTPATIENT)
Dept: FAMILY MEDICINE | Facility: CLINIC | Age: 70
End: 2020-06-02

## 2020-06-02 DIAGNOSIS — E03.9 HYPOTHYROIDISM, UNSPECIFIED TYPE: ICD-10-CM

## 2020-06-03 RX ORDER — LEVOTHYROXINE SODIUM 25 UG/1
25 TABLET ORAL DAILY
Qty: 90 TABLET | Refills: 0 | Status: CANCELLED | OUTPATIENT
Start: 2020-06-03

## 2020-06-03 NOTE — TELEPHONE ENCOUNTER
Left voice message asking pt to call triage back. On call back , please find how we can help pt. Does pt need a short supply rx sent to a local pharmacy until he is able to get medication from [facility]?

## 2020-06-04 NOTE — TELEPHONE ENCOUNTER
Pt calling back into clinic.    Pt actually already has short term supply at [facility].  No further action needed at this time.

## 2020-07-08 ENCOUNTER — TELEPHONE (OUTPATIENT)
Dept: FAMILY MEDICINE | Facility: CLINIC | Age: 70
End: 2020-07-08

## 2020-07-08 DIAGNOSIS — E03.9 ACQUIRED HYPOTHYROIDISM: ICD-10-CM

## 2020-07-08 NOTE — TELEPHONE ENCOUNTER
Reason for Call: Request for an order or referral:    Order or referral being requested: Lab orders to check thyroid levels    Date needed: as soon as possible    Has the patient been seen by the PCP for this problem? YES    Additional comments: The patient called and stated that he would like Dr. Tompkins to put in lab orders so he can have his thyroid levels checked. He would like a call back once the orders have been put in so he knows he can schedule a lab only appointment.     Phone number Patient can be reached at:  Cell number on file:    Telephone Information:   Mobile [phone]       Best Time: Any    Can we leave a detailed message on this number?  YES    Call taken on 7/8/2020 at 4:08 PM by Angella Peck

## 2020-07-09 DIAGNOSIS — E03.9 ACQUIRED HYPOTHYROIDISM: ICD-10-CM

## 2020-07-09 PROCEDURE — 84443 ASSAY THYROID STIM HORMONE: CPT | Performed by: FAMILY MEDICINE

## 2020-07-09 PROCEDURE — 36415 COLL VENOUS BLD VENIPUNCTURE: CPT | Performed by: FAMILY MEDICINE

## 2020-07-09 PROCEDURE — 84439 ASSAY OF FREE THYROXINE: CPT | Performed by: FAMILY MEDICINE

## 2020-07-10 LAB
T4 FREE SERPL-MCNC: 0.84 NG/DL (ref 0.76–1.46)
TSH SERPL DL<=0.005 MIU/L-ACNC: 4.8 MU/L (ref 0.4–4)

## 2020-07-15 NOTE — TELEPHONE ENCOUNTER
Controlled Substance Refill Request for lisdexamfetamine (VYVANSE) 70 MG capsule   Problem List Complete:  Yes    No CSA on file    check 1/30/19 Provider to review     Last Written Prescription Date:  8/26/2019  Last Fill Quantity: 30 capsule,  # refills: 0   Last office visit: 8/26/2019 with prescribing provider:  Leda   Future Office Visit:        Controlled substance agreement:   Encounter-Level CSA:    There are no encounter-level csa.     Patient-Level CSA:    There are no patient-level csa.         Last Urine Drug Screen: No results found for: CDAUT, No results found for: COMDAT, No results found for: THC13, PCP13, COC13, MAMP13, OPI13, AMP13, BZO13, TCA13, MTD13, BAR13, OXY13, PPX13, BUP13         [email or web address]   checked in past 3 months?  No, route to RN             Discharge planning issues

## 2020-07-27 ENCOUNTER — VIRTUAL VISIT (OUTPATIENT)
Dept: FAMILY MEDICINE | Facility: CLINIC | Age: 70
End: 2020-07-27
Payer: MEDICARE

## 2020-07-27 DIAGNOSIS — E03.9 ACQUIRED HYPOTHYROIDISM: Primary | ICD-10-CM

## 2020-07-27 PROCEDURE — 99442 ZZC PHYSICIAN TELEPHONE EVALUATION 11-20 MIN: CPT | Performed by: FAMILY MEDICINE

## 2020-07-27 RX ORDER — LEVOTHYROXINE SODIUM 25 UG/1
TABLET ORAL
Qty: 90 TABLET | Refills: 1 | Status: SHIPPED | OUTPATIENT
Start: 2020-07-27 | End: 2020-07-29

## 2020-07-27 ASSESSMENT — ANXIETY QUESTIONNAIRES
GAD7 TOTAL SCORE: 0
1. FEELING NERVOUS, ANXIOUS, OR ON EDGE: NOT AT ALL
5. BEING SO RESTLESS THAT IT IS HARD TO SIT STILL: NOT AT ALL
6. BECOMING EASILY ANNOYED OR IRRITABLE: NOT AT ALL
3. WORRYING TOO MUCH ABOUT DIFFERENT THINGS: NOT AT ALL
7. FEELING AFRAID AS IF SOMETHING AWFUL MIGHT HAPPEN: NOT AT ALL
2. NOT BEING ABLE TO STOP OR CONTROL WORRYING: NOT AT ALL
IF YOU CHECKED OFF ANY PROBLEMS ON THIS QUESTIONNAIRE, HOW DIFFICULT HAVE THESE PROBLEMS MADE IT FOR YOU TO DO YOUR WORK, TAKE CARE OF THINGS AT HOME, OR GET ALONG WITH OTHER PEOPLE: NOT DIFFICULT AT ALL

## 2020-07-27 ASSESSMENT — PATIENT HEALTH QUESTIONNAIRE - PHQ9
5. POOR APPETITE OR OVEREATING: NOT AT ALL
SUM OF ALL RESPONSES TO PHQ QUESTIONS 1-9: 2

## 2020-07-27 NOTE — PROGRESS NOTES
"Stuart Moran is a 70 year old male who is being evaluated via a billable video visit.      The patient has been notified of following:     \"This video visit will be conducted via a call between you and your physician/provider. We have found that certain health care needs can be provided without the need for an in-person physical exam.  This service lets us provide the care you need with a video conversation.  If a prescription is necessary we can send it directly to your pharmacy.  If lab work is needed we can place an order for that and you can then stop by our lab to have the test done at a later time.    Video visits are billed at different rates depending on your insurance coverage.  Please reach out to your insurance provider with any questions.    If during the course of the call the physician/provider feels a video visit is not appropriate, you will not be charged for this service.\"    Patient has given verbal consent for Video visit? Yes  How would you like to obtain your AVS? MyChart  If you are dropped from the video visit, the video invite should be resent to: Text to cell phone: [phone]  Will anyone else be joining your video visit? No    Subjective     Stuart Moran is a 70 year old male who presents today via video visit for the following health issues:    HPI    Hypothyroidism Follow-up      Since last visit, patient describes the following symptoms: Weight stable, no hair loss, no skin changes, no constipation, no loose stools      How many servings of fruits and vegetables do you eat daily?  4 or more    On average, how many sweetened beverages do you drink each day (Examples: soda, juice, sweet tea, etc.  Do NOT count diet or artificially sweetened beverages)?   0    How many days per week do you exercise enough to make your heart beat faster? 5    How many minutes a day do you exercise enough to make your heart beat faster? 60 or more    How many days per week do you miss taking your " medication? 0         Video Start Time: Canceled        Patient Active Problem List   Diagnosis     Hypertension goal BP (blood pressure) < 140/90     ADHD (attention deficit hyperactivity disorder)     Depression, major, recurrent, in complete remission (H)     Insomnia     Hyperlipidemia LDL goal <100     BPH (benign prostatic hyperplasia)     Spinal stenosis of cervical region     ACP (advance care planning)     Anxiety     Back muscle spasm     Gastroesophageal reflux disease without esophagitis     Screening for prostate cancer     Mandibular hypoplasia     Skin tag     Acquired hypothyroidism     Past Surgical History:   Procedure Laterality Date     APPENDECTOMY       CARPAL TUNNEL RELEASE RT/LT      bilateral     CATARACT IOL, RT/LT Bilateral 2016     COSMETIC OSTEOTOMY SAGITTAL SPLIT WITH MANDIBLE ADVANCEMENT N/A 11/29/2017    Procedure: COSMETIC OSTEOTOMY SAGITTAL SPLIT WITH MANDIBLE ADVANCEMENT;  (COSMETIC) SAGITTAL SPLIT OSTEOTOMY ;  Surgeon: Daniel Hyde DDS;  Location: [facility]     EYE SURGERY      bilateral strabismus     HERNIA REPAIR      umbilical     ORTHOPEDIC SURGERY      left knee meniscus       Social History     Tobacco Use     Smoking status: Never Smoker     Smokeless tobacco: Never Used   Substance Use Topics     Alcohol use: No     Family History   Problem Relation Age of Onset     Alzheimer Disease Father      C.A.D. Mother         PTCA     Thyroid Disease Mother         grave's disease     Heart Disease Mother         aortic valve replacement     Thyroid Disease Sister         grave's     Thyroid Disease Sister         graves           Reviewed and updated as needed this visit by Provider         Review of Systems   Constitutional, HEENT, cardiovascular, pulmonary, gi and gu systems are negative, except as otherwise noted.      Objective             Physical Exam     GENERAL: Healthy, alert and no distress  RESP: No audible wheeze, cough, or visible cyanosis.  No visible retractions or  "increased work of breathing.    PSYCH: Mentation appears normal, affect normal/bright, judgement and insight intact, normal speech and appearance well-groomed.              Assessment & Plan       ICD-10-CM    1. Acquired hypothyroidism  E03.9 TSH     T4 FREE     DISCONTINUED: levothyroxine (SYNTHROID/LEVOTHROID) 25 MCG tablet        BMI:   Estimated body mass index is 29.65 kg/m  as calculated from the following:    Height as of 2/4/20: 1.727 m (5' 8\").    Weight as of 2/4/20: 88.5 kg (195 lb).           Patient Instructions   I will have him increase his levothyroxine to 2 tablets Mondays and Fridays and 1 tablet all the other days of the week.  He will follow-up in 1-2 months with repeat thyroid testing.  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      Return in about 2 months (around 9/27/2020) for labs.    Lucas Tompkins MD  [facility]      Video-Visit Details    Type of service:  phone visit lasting 18 minutes    Video End Time: Cancel    Originating Location (pt. Location): Home    Distant Location (provider location):  [facility]     Platform used for Video Visit: Doximity    Return in about 2 months (around 9/27/2020) for labs.       Lucas Tompkins MD        "

## 2020-07-27 NOTE — LETTER
[facility]  [address]  [place] [address]  Phone: [phone]  Fax: [phone]    July 27, 2020        Stuart Moran  [address] [place] [address]          To whom it may concern:    RE: Stuart Moran    Due to the patient's age, his myriad health issues and the fact that he takes care of his 97-year-old mother 3 days a week, this patient should not be traveling during the coronavirus pandemic.    Please contact me for questions or concerns.      Sincerely,        Lucas Tompkisn MD

## 2020-07-27 NOTE — PATIENT INSTRUCTIONS
I will have him increase his levothyroxine to 2 tablets Mondays and Fridays and 1 tablet all the other days of the week.  He will follow-up in 1-2 months with repeat thyroid testing.  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

## 2020-07-28 ASSESSMENT — ANXIETY QUESTIONNAIRES: GAD7 TOTAL SCORE: 0

## 2020-08-18 DIAGNOSIS — F51.01 PRIMARY INSOMNIA: ICD-10-CM

## 2020-08-18 DIAGNOSIS — F33.42 MAJOR DEPRESSIVE DISORDER, RECURRENT EPISODE, IN FULL REMISSION (H): ICD-10-CM

## 2020-08-18 RX ORDER — VENLAFAXINE HYDROCHLORIDE 150 MG/1
CAPSULE, EXTENDED RELEASE ORAL
Qty: 180 CAPSULE | Refills: 1 | Status: SHIPPED | OUTPATIENT
Start: 2020-08-18 | End: 2021-02-15

## 2020-08-18 RX ORDER — DOXEPIN HYDROCHLORIDE 25 MG/1
CAPSULE ORAL
Qty: 270 CAPSULE | Refills: 1 | Status: SHIPPED | OUTPATIENT
Start: 2020-08-18 | End: 2021-02-15

## 2020-08-25 ENCOUNTER — MYC MEDICAL ADVICE (OUTPATIENT)
Dept: FAMILY MEDICINE | Facility: CLINIC | Age: 70
End: 2020-08-25

## 2020-08-25 NOTE — TELEPHONE ENCOUNTER
Pneumovax is due and can be done when he comes in to get his TSH done.  He can hold off on getting his flu shot for the time being.

## 2020-08-26 ENCOUNTER — ALLIED HEALTH/NURSE VISIT (OUTPATIENT)
Dept: NURSING | Facility: CLINIC | Age: 70
End: 2020-08-26
Payer: MEDICARE

## 2020-08-26 DIAGNOSIS — E03.9 ACQUIRED HYPOTHYROIDISM: ICD-10-CM

## 2020-08-26 DIAGNOSIS — Z23 NEED FOR VACCINATION: Primary | ICD-10-CM

## 2020-08-26 PROCEDURE — 99207 ZZC NO CHARGE LOS: CPT

## 2020-08-26 PROCEDURE — 36415 COLL VENOUS BLD VENIPUNCTURE: CPT | Performed by: FAMILY MEDICINE

## 2020-08-26 PROCEDURE — G0009 ADMIN PNEUMOCOCCAL VACCINE: HCPCS

## 2020-08-26 PROCEDURE — 90732 PPSV23 VACC 2 YRS+ SUBQ/IM: CPT

## 2020-08-26 PROCEDURE — 84439 ASSAY OF FREE THYROXINE: CPT | Performed by: FAMILY MEDICINE

## 2020-08-26 PROCEDURE — 84443 ASSAY THYROID STIM HORMONE: CPT | Performed by: FAMILY MEDICINE

## 2020-08-26 NOTE — PROGRESS NOTES
Prior to immunization administration, verified patients identity using patient s name and date of birth. Please see Immunization Activity for additional information.     Screening Questionnaire for Adult Immunization    Are you sick today?   No   Do you have allergies to medications, food, a vaccine component or latex?   No   Have you ever had a serious reaction after receiving a vaccination?   No   Do you have a long-term health problem with heart, lung, kidney, or metabolic disease (e.g., diabetes), asthma, a blood disorder, no spleen, complement component deficiency, a cochlear implant, or a spinal fluid leak?  Are you on long-term aspirin therapy?   No   Do you have cancer, leukemia, HIV/AIDS, or any other immune system problem?   No   Do you have a parent, brother, or sister with an immune system problem?   No   In the past 3 months, have you taken medications that affect  your immune system, such as prednisone, other steroids, or anticancer drugs; drugs for the treatment of rheumatoid arthritis, Crohn s disease, or psoriasis; or have you had radiation treatments?   No   Have you had a seizure, or a brain or other nervous system problem?   No   During the past year, have you received a transfusion of blood or blood    products, or been given immune (gamma) globulin or antiviral drug?   No   For women: Are you pregnant or is there a chance you could become       pregnant during the next month?   No   Have you received any vaccinations in the past 4 weeks?   No     Immunization questionnaire answers were all negative.        Per orders of Dr. Tompkins, injection of pneumo 23 given by Araceli Young CMA. Patient instructed to remain in clinic for 15 minutes afterwards, and to report any adverse reaction to me immediately.       Screening performed by Araceli Young CMA on 8/26/2020 at 10:56 AM.

## 2020-08-27 LAB
T4 FREE SERPL-MCNC: 0.85 NG/DL (ref 0.76–1.46)
TSH SERPL DL<=0.005 MIU/L-ACNC: 4.44 MU/L (ref 0.4–4)

## 2020-09-01 DIAGNOSIS — E03.9 ACQUIRED HYPOTHYROIDISM: Primary | ICD-10-CM

## 2020-09-11 DIAGNOSIS — E03.9 ACQUIRED HYPOTHYROIDISM: ICD-10-CM

## 2020-09-11 RX ORDER — LEVOTHYROXINE SODIUM 25 UG/1
TABLET ORAL
Qty: 129 TABLET | Refills: 0
Start: 2020-09-11 | End: 2020-09-19

## 2020-09-19 ENCOUNTER — MYC REFILL (OUTPATIENT)
Dept: FAMILY MEDICINE | Facility: CLINIC | Age: 70
End: 2020-09-19

## 2020-09-19 DIAGNOSIS — E03.9 ACQUIRED HYPOTHYROIDISM: ICD-10-CM

## 2020-09-19 DIAGNOSIS — F90.0 ATTENTION DEFICIT HYPERACTIVITY DISORDER (ADHD), PREDOMINANTLY INATTENTIVE TYPE: ICD-10-CM

## 2020-09-21 RX ORDER — LEVOTHYROXINE SODIUM 25 UG/1
TABLET ORAL
Qty: 129 TABLET | Refills: 0 | Status: SHIPPED | OUTPATIENT
Start: 2020-09-21 | End: 2020-11-02

## 2020-09-21 RX ORDER — LISDEXAMFETAMINE DIMESYLATE 70 MG/1
70 CAPSULE ORAL DAILY
Qty: 90 CAPSULE | Refills: 0 | Status: SHIPPED | OUTPATIENT
Start: 2020-09-21 | End: 2021-01-25

## 2020-09-21 NOTE — TELEPHONE ENCOUNTER
levothyroxine (SYNTHROID/LEVOTHROID) 25 MCG tablet     Routing refill request to provider for review/approval because:  Thyroid Protocol [id number]/19/2020 06:19 PM   Normal TSH on file in past 12 months     Controlled Substance Refill Request for: lisdexamfetamine (VYVANSE) 70 MG capsule   Problem List Complete:  No     PROVIDER TO CONSIDER COMPLETION OF PROBLEM LIST AND OVERVIEW/CONTROLLED SUBSTANCE AGREEMENT    Controlled substance agreement:   Encounter-Level CSA:    There are no encounter-level csa.     Patient-Level CSA:    There are no patient-level csa.         Last Urine Drug Screen: No results found for: CDAUT, No results found for: COMDAT, No results found for: THC13, PCP13, COC13, MAMP13, OPI13, AMP13, BZO13, TCA13, MTD13, BAR13, OXY13, PPX13, BUP13     Processing:  Rx to be electronically transmitted to pharmacy by provider      [email or web address]     checked in past 3 months?  Yes 9/21/20

## 2020-09-22 DIAGNOSIS — F90.0 ATTENTION DEFICIT HYPERACTIVITY DISORDER (ADHD), PREDOMINANTLY INATTENTIVE TYPE: ICD-10-CM

## 2020-09-22 RX ORDER — LISDEXAMFETAMINE DIMESYLATE 70 MG/1
70 CAPSULE ORAL DAILY
Qty: 90 CAPSULE | Refills: 0 | Status: CANCELLED | OUTPATIENT
Start: 2020-09-22

## 2020-10-06 ENCOUNTER — TELEPHONE (OUTPATIENT)
Dept: FAMILY MEDICINE | Facility: CLINIC | Age: 70
End: 2020-10-06

## 2020-10-06 ENCOUNTER — MYC MEDICAL ADVICE (OUTPATIENT)
Dept: FAMILY MEDICINE | Facility: CLINIC | Age: 70
End: 2020-10-06

## 2020-10-06 NOTE — TELEPHONE ENCOUNTER
Prior Authorization Specialty Medication Request          Medication/Dose:   ICD code (if different than what is on RX):    Previously Tried and Failed:      Important Lab Values:   Rationale:     Insurance Name:   Insurance ID:   Insurance Phone Number:     Pharmacy Information (if different than what is on RX)  Name:    Phone:

## 2020-10-06 NOTE — TELEPHONE ENCOUNTER
Central Prior Authorization Team   Phone: [phone]      PA Initiation    Medication:   Insurance Company: Medicare Blue - Phone [phone] Fax [phone]  Pharmacy Filling the Rx: [facility] - [place], [place] - [address] AT PORTAL TO REGISTERED [facility] SITES  Filling Pharmacy Phone: [phone]  Filling Pharmacy Fax:    Start Date: 10/6/2020    Started PA on CMM and a response of A pending Prior Authorization request for the Patient and Medication exist.  Called and spoke to Juan Antonio at [facility], answered clinical questions.

## 2020-10-06 NOTE — TELEPHONE ENCOUNTER
Prior Authorization Approval    Authorization Effective Date: 10/6/2020  Authorization Expiration Date: 10/6/2021  Medication: vyvanse-APPROVED  Approved Dose/Quantity:   Reference #:     Insurance Company: Medicare Blue - Phone [phone] Fax [phone]  Expected CoPay:       CoPay Card Available:      Foundation Assistance Needed:    Which Pharmacy is filling the prescription (Not needed for infusion/clinic administered): [facility] - [place], [place] - [address] AT PORTAL TO [facility] [address] [facility]  Pharmacy Notified: Yes  Patient Notified: No    Got verbal approval.  Pharmacy will notify patient when medication is ready.

## 2020-10-06 NOTE — TELEPHONE ENCOUNTER
Reason for Call:  Form, our goal is to have forms completed with 72 hours, however, some forms may require a visit or additional information.    Type of letter, form or note:  medical    Who is the form from?: Insurance comp    Where did the form come from: form was faxed in    What clinic location was the form placed at?: [facility]    Where the form was placed: B Box/Folder    What number is listed as a contact on the form?: [phone]       Additional comments: [facility]: Additional Information needed for PA    Call taken on 10/6/2020 at 2:55 PM by Angella Bethea

## 2020-10-29 DIAGNOSIS — E03.9 ACQUIRED HYPOTHYROIDISM: ICD-10-CM

## 2020-10-29 LAB
T4 FREE SERPL-MCNC: 0.96 NG/DL (ref 0.76–1.46)
TSH SERPL DL<=0.005 MIU/L-ACNC: 4.2 MU/L (ref 0.4–4)

## 2020-10-29 PROCEDURE — 36415 COLL VENOUS BLD VENIPUNCTURE: CPT | Performed by: FAMILY MEDICINE

## 2020-10-29 PROCEDURE — 84443 ASSAY THYROID STIM HORMONE: CPT | Performed by: FAMILY MEDICINE

## 2020-10-29 PROCEDURE — 84439 ASSAY OF FREE THYROXINE: CPT | Performed by: FAMILY MEDICINE

## 2020-11-02 DIAGNOSIS — E03.9 ACQUIRED HYPOTHYROIDISM: ICD-10-CM

## 2020-11-02 RX ORDER — LEVOTHYROXINE SODIUM 25 UG/1
37.5 TABLET ORAL DAILY
Qty: 135 TABLET | Refills: 0 | Status: SHIPPED | OUTPATIENT
Start: 2020-11-02 | End: 2020-12-03

## 2020-12-18 ENCOUNTER — TELEPHONE (OUTPATIENT)
Dept: LAB | Facility: CLINIC | Age: 70
End: 2020-12-18

## 2020-12-18 ENCOUNTER — MYC MEDICAL ADVICE (OUTPATIENT)
Dept: INTERNAL MEDICINE | Facility: CLINIC | Age: 70
End: 2020-12-18

## 2020-12-18 ENCOUNTER — APPOINTMENT (OUTPATIENT)
Dept: LAB | Facility: CLINIC | Age: 70
End: 2020-12-18
Payer: MEDICARE

## 2020-12-18 DIAGNOSIS — E03.9 ACQUIRED HYPOTHYROIDISM: Primary | ICD-10-CM

## 2020-12-18 NOTE — TELEPHONE ENCOUNTER
Needs future lab order.   The only thing he appears due for is the thyroid lab.    Order placed on behalf of Dr. Tompkins.     (this was done 19 minutes after he sent his [employer] message)

## 2020-12-18 NOTE — TELEPHONE ENCOUNTER
Left message for patient to call back. Huddled with Dr. Moseley. Patient just needs thyroid labs right now. Orders were entered. Was he able to get done? Or can we help him reschedule?    Also will need other labs this spring including PSA.

## 2020-12-18 NOTE — TELEPHONE ENCOUNTER
Dr. Tompkins your patient is coming to [facility] this am for a blood draw.. but there are no orders in his chart . The notes say tsh needed... would you like to order something?    Thanks lab

## 2020-12-21 DIAGNOSIS — E03.9 ACQUIRED HYPOTHYROIDISM: ICD-10-CM

## 2020-12-21 PROCEDURE — 36415 COLL VENOUS BLD VENIPUNCTURE: CPT | Performed by: FAMILY MEDICINE

## 2020-12-21 PROCEDURE — 84443 ASSAY THYROID STIM HORMONE: CPT | Performed by: FAMILY MEDICINE

## 2020-12-22 LAB — TSH SERPL DL<=0.005 MIU/L-ACNC: 3.55 MU/L (ref 0.4–4)

## 2020-12-23 ENCOUNTER — MYC MEDICAL ADVICE (OUTPATIENT)
Dept: INTERNAL MEDICINE | Facility: CLINIC | Age: 70
End: 2020-12-23

## 2020-12-23 DIAGNOSIS — E03.9 ACQUIRED HYPOTHYROIDISM: ICD-10-CM

## 2020-12-24 NOTE — TELEPHONE ENCOUNTER
PCP please see MyChart message regarding additional question on thyroid labs.     Did patient not need T4 Free?    Kanika MORALEZN, RN, PHN

## 2021-01-25 ENCOUNTER — MYC REFILL (OUTPATIENT)
Dept: FAMILY MEDICINE | Facility: CLINIC | Age: 71
End: 2021-01-25

## 2021-01-25 DIAGNOSIS — F90.0 ATTENTION DEFICIT HYPERACTIVITY DISORDER (ADHD), PREDOMINANTLY INATTENTIVE TYPE: ICD-10-CM

## 2021-01-27 RX ORDER — LISDEXAMFETAMINE DIMESYLATE 70 MG/1
70 CAPSULE ORAL DAILY
Qty: 90 CAPSULE | Refills: 0 | Status: SHIPPED | OUTPATIENT
Start: 2021-01-27 | End: 2021-05-18

## 2021-01-27 NOTE — TELEPHONE ENCOUNTER
Controlled Substance Refill Request for: lisdexamfetamine (VYVANSE) 70 MG capsule  Problem List Complete:  No     PROVIDER TO CONSIDER COMPLETION OF PROBLEM LIST AND OVERVIEW/CONTROLLED SUBSTANCE AGREEMENT    Controlled substance agreement:   Encounter-Level CSA:    There are no encounter-level csa.     Patient-Level CSA:    There are no patient-level csa.         Last Urine Drug Screen: No results found for: CDAUT, No results found for: COMDAT, No results found for: THC13, PCP13, COC13, MAMP13, OPI13, AMP13, BZO13, TCA13, MTD13, BAR13, OXY13, PPX13, BUP13     Processing:  Rx to be electronically transmitted to pharmacy by provider      [email or web address]       checked in past 3 months?  Yes 1/27/21 - no concerns

## 2021-02-12 DIAGNOSIS — I10 ESSENTIAL HYPERTENSION WITH GOAL BLOOD PRESSURE LESS THAN 140/90: Chronic | ICD-10-CM

## 2021-02-12 DIAGNOSIS — N40.0 BENIGN PROSTATIC HYPERPLASIA WITHOUT LOWER URINARY TRACT SYMPTOMS: ICD-10-CM

## 2021-02-12 DIAGNOSIS — F33.42 MAJOR DEPRESSIVE DISORDER, RECURRENT EPISODE, IN FULL REMISSION (H): ICD-10-CM

## 2021-02-12 DIAGNOSIS — F51.01 PRIMARY INSOMNIA: ICD-10-CM

## 2021-02-15 RX ORDER — LISINOPRIL 10 MG/1
TABLET ORAL
Qty: 90 TABLET | Refills: 0 | Status: SHIPPED | OUTPATIENT
Start: 2021-02-15 | End: 2021-05-07

## 2021-02-15 RX ORDER — VENLAFAXINE HYDROCHLORIDE 150 MG/1
CAPSULE, EXTENDED RELEASE ORAL
Qty: 180 CAPSULE | Refills: 0 | Status: SHIPPED | OUTPATIENT
Start: 2021-02-15 | End: 2021-05-07

## 2021-02-15 RX ORDER — DOXEPIN HYDROCHLORIDE 25 MG/1
CAPSULE ORAL
Qty: 270 CAPSULE | Refills: 0 | Status: SHIPPED | OUTPATIENT
Start: 2021-02-15 | End: 2021-05-07

## 2021-02-15 RX ORDER — DUTASTERIDE 0.5 MG/1
CAPSULE, LIQUID FILLED ORAL
Qty: 90 CAPSULE | Refills: 0 | Status: SHIPPED | OUTPATIENT
Start: 2021-02-15 | End: 2021-05-07

## 2021-02-22 DIAGNOSIS — E03.9 ACQUIRED HYPOTHYROIDISM: ICD-10-CM

## 2021-02-23 RX ORDER — LEVOTHYROXINE SODIUM 25 UG/1
TABLET ORAL
Qty: 135 TABLET | Refills: 1 | Status: SHIPPED | OUTPATIENT
Start: 2021-02-23 | End: 2021-07-27

## 2021-03-07 ENCOUNTER — IMMUNIZATION (OUTPATIENT)
Dept: NURSING | Facility: CLINIC | Age: 71
End: 2021-03-07
Payer: MEDICARE

## 2021-03-07 PROCEDURE — 0031A PR COVID VAC JANSSEN AD26 0.5ML: CPT

## 2021-03-07 PROCEDURE — 91303 PR COVID VAC JANSSEN AD26 0.5ML: CPT

## 2021-05-06 DIAGNOSIS — I10 ESSENTIAL HYPERTENSION WITH GOAL BLOOD PRESSURE LESS THAN 140/90: Chronic | ICD-10-CM

## 2021-05-06 DIAGNOSIS — F33.42 MAJOR DEPRESSIVE DISORDER, RECURRENT EPISODE, IN FULL REMISSION (H): ICD-10-CM

## 2021-05-06 DIAGNOSIS — N40.0 BENIGN PROSTATIC HYPERPLASIA WITHOUT LOWER URINARY TRACT SYMPTOMS: ICD-10-CM

## 2021-05-06 DIAGNOSIS — F51.01 PRIMARY INSOMNIA: ICD-10-CM

## 2021-05-06 DIAGNOSIS — E78.5 HYPERLIPIDEMIA LDL GOAL <100: ICD-10-CM

## 2021-05-06 NOTE — LETTER
[facility]  [address] [id number] [phone]            Stuart Moran  [address] [place] [address]        May 12, 2021    Dear Stuart,    While refilling your prescription today, we noticed that you are due to have labs drawn and see your provider.  We will refill your prescription for 90  days, but a follow-up appointment must be made before any additional refills can be approved.     Taking care of your health is important to us and we look forward to seeing you in the near future.  Please call us at [phone] or [phone] (or use [employer]) to schedule an appointment.     Please disregard this notice if you have already made an appointment.    Sincerely,        [facility]

## 2021-05-07 RX ORDER — DUTASTERIDE 0.5 MG/1
CAPSULE, LIQUID FILLED ORAL
Qty: 90 CAPSULE | Refills: 0 | Status: SHIPPED | OUTPATIENT
Start: 2021-05-07 | End: 2021-05-20

## 2021-05-07 RX ORDER — VENLAFAXINE HYDROCHLORIDE 150 MG/1
CAPSULE, EXTENDED RELEASE ORAL
Qty: 180 CAPSULE | Refills: 0 | Status: SHIPPED | OUTPATIENT
Start: 2021-05-07 | End: 2021-08-02

## 2021-05-07 RX ORDER — ATORVASTATIN CALCIUM 80 MG/1
TABLET, FILM COATED ORAL
Qty: 90 TABLET | Refills: 0 | Status: SHIPPED | OUTPATIENT
Start: 2021-05-07 | End: 2021-05-20

## 2021-05-07 RX ORDER — LISINOPRIL 10 MG/1
TABLET ORAL
Qty: 90 TABLET | Refills: 0 | Status: SHIPPED | OUTPATIENT
Start: 2021-05-07 | End: 2021-05-20

## 2021-05-07 RX ORDER — DOXEPIN HYDROCHLORIDE 25 MG/1
CAPSULE ORAL
Qty: 270 CAPSULE | Refills: 0 | Status: SHIPPED | OUTPATIENT
Start: 2021-05-07 | End: 2021-05-20

## 2021-05-07 NOTE — TELEPHONE ENCOUNTER
Dutasteride: Medication is being filled for 1 time refill only due to:  Patient needs to be seen because it has been more than one year since last visit.

## 2021-05-18 ASSESSMENT — ACTIVITIES OF DAILY LIVING (ADL): CURRENT_FUNCTION: NO ASSISTANCE NEEDED

## 2021-05-19 ENCOUNTER — OFFICE VISIT (OUTPATIENT)
Dept: INTERNAL MEDICINE | Facility: CLINIC | Age: 71
End: 2021-05-19
Payer: MEDICARE

## 2021-05-19 VITALS
TEMPERATURE: 98.5 F | WEIGHT: 185 LBS | HEART RATE: 74 BPM | DIASTOLIC BLOOD PRESSURE: 66 MMHG | OXYGEN SATURATION: 99 % | BODY MASS INDEX: 28.13 KG/M2 | SYSTOLIC BLOOD PRESSURE: 138 MMHG

## 2021-05-19 DIAGNOSIS — E78.5 HYPERLIPIDEMIA LDL GOAL <100: Chronic | ICD-10-CM

## 2021-05-19 DIAGNOSIS — E03.9 ACQUIRED HYPOTHYROIDISM: ICD-10-CM

## 2021-05-19 DIAGNOSIS — F90.0 ATTENTION DEFICIT HYPERACTIVITY DISORDER (ADHD), PREDOMINANTLY INATTENTIVE TYPE: ICD-10-CM

## 2021-05-19 DIAGNOSIS — N40.0 BENIGN PROSTATIC HYPERPLASIA WITHOUT LOWER URINARY TRACT SYMPTOMS: ICD-10-CM

## 2021-05-19 DIAGNOSIS — I10 HYPERTENSION GOAL BP (BLOOD PRESSURE) < 140/90: Chronic | ICD-10-CM

## 2021-05-19 DIAGNOSIS — K21.9 GASTROESOPHAGEAL REFLUX DISEASE WITHOUT ESOPHAGITIS: ICD-10-CM

## 2021-05-19 DIAGNOSIS — F51.01 PRIMARY INSOMNIA: ICD-10-CM

## 2021-05-19 DIAGNOSIS — Z00.00 ROUTINE MEDICAL EXAM: Primary | ICD-10-CM

## 2021-05-19 DIAGNOSIS — I10 ESSENTIAL HYPERTENSION WITH GOAL BLOOD PRESSURE LESS THAN 140/90: Chronic | ICD-10-CM

## 2021-05-19 DIAGNOSIS — F41.9 ANXIETY: ICD-10-CM

## 2021-05-19 DIAGNOSIS — F33.42 DEPRESSION, MAJOR, RECURRENT, IN COMPLETE REMISSION (H): ICD-10-CM

## 2021-05-19 DIAGNOSIS — Z12.5 SCREENING FOR PROSTATE CANCER: ICD-10-CM

## 2021-05-19 LAB
ALBUMIN UR-MCNC: NEGATIVE MG/DL
APPEARANCE UR: CLEAR
BASOPHILS # BLD AUTO: 0 10E9/L (ref 0–0.2)
BASOPHILS NFR BLD AUTO: 0.1 %
BILIRUB UR QL STRIP: NEGATIVE
COLOR UR AUTO: YELLOW
DIFFERENTIAL METHOD BLD: NORMAL
EOSINOPHIL # BLD AUTO: 0.1 10E9/L (ref 0–0.7)
EOSINOPHIL NFR BLD AUTO: 0.8 %
ERYTHROCYTE [DISTWIDTH] IN BLOOD BY AUTOMATED COUNT: 13.4 % (ref 10–15)
GLUCOSE UR STRIP-MCNC: NEGATIVE MG/DL
HCT VFR BLD AUTO: 42.2 % (ref 40–53)
HGB BLD-MCNC: 14.1 G/DL (ref 13.3–17.7)
HGB UR QL STRIP: NEGATIVE
KETONES UR STRIP-MCNC: NEGATIVE MG/DL
LEUKOCYTE ESTERASE UR QL STRIP: NEGATIVE
LYMPHOCYTES # BLD AUTO: 1.1 10E9/L (ref 0.8–5.3)
LYMPHOCYTES NFR BLD AUTO: 13.7 %
MCH RBC QN AUTO: 31.1 PG (ref 26.5–33)
MCHC RBC AUTO-ENTMCNC: 33.4 G/DL (ref 31.5–36.5)
MCV RBC AUTO: 93 FL (ref 78–100)
MONOCYTES # BLD AUTO: 0.7 10E9/L (ref 0–1.3)
MONOCYTES NFR BLD AUTO: 8.4 %
NEUTROPHILS # BLD AUTO: 6.1 10E9/L (ref 1.6–8.3)
NEUTROPHILS NFR BLD AUTO: 77 %
NITRATE UR QL: NEGATIVE
NON-SQ EPI CELLS #/AREA URNS LPF: NORMAL /LPF
PH UR STRIP: 6 PH (ref 5–7)
PLATELET # BLD AUTO: 206 10E9/L (ref 150–450)
RBC # BLD AUTO: 4.54 10E12/L (ref 4.4–5.9)
RBC #/AREA URNS AUTO: NORMAL /HPF
SOURCE: NORMAL
SP GR UR STRIP: 1.01 (ref 1–1.03)
UROBILINOGEN UR STRIP-ACNC: 0.2 EU/DL (ref 0.2–1)
WBC # BLD AUTO: 7.9 10E9/L (ref 4–11)
WBC #/AREA URNS AUTO: NORMAL /HPF

## 2021-05-19 PROCEDURE — 36415 COLL VENOUS BLD VENIPUNCTURE: CPT | Performed by: FAMILY MEDICINE

## 2021-05-19 PROCEDURE — 85025 COMPLETE CBC W/AUTO DIFF WBC: CPT | Performed by: FAMILY MEDICINE

## 2021-05-19 PROCEDURE — G0103 PSA SCREENING: HCPCS | Performed by: FAMILY MEDICINE

## 2021-05-19 PROCEDURE — 80053 COMPREHEN METABOLIC PANEL: CPT | Performed by: FAMILY MEDICINE

## 2021-05-19 PROCEDURE — 80061 LIPID PANEL: CPT | Performed by: FAMILY MEDICINE

## 2021-05-19 PROCEDURE — 81001 URINALYSIS AUTO W/SCOPE: CPT | Performed by: FAMILY MEDICINE

## 2021-05-19 PROCEDURE — 84439 ASSAY OF FREE THYROXINE: CPT | Performed by: FAMILY MEDICINE

## 2021-05-19 PROCEDURE — 99214 OFFICE O/P EST MOD 30 MIN: CPT | Mod: 25 | Performed by: FAMILY MEDICINE

## 2021-05-19 PROCEDURE — G0439 PPPS, SUBSEQ VISIT: HCPCS | Performed by: FAMILY MEDICINE

## 2021-05-19 PROCEDURE — 84443 ASSAY THYROID STIM HORMONE: CPT | Performed by: FAMILY MEDICINE

## 2021-05-19 ASSESSMENT — ACTIVITIES OF DAILY LIVING (ADL): CURRENT_FUNCTION: NO ASSISTANCE NEEDED

## 2021-05-19 NOTE — PROGRESS NOTES
"SUBJECTIVE:   Stuart Moran is a 71 year old male who presents for Preventive Visit.      Patient has been advised of split billing requirements and indicates understanding: Yes   Are you in the first 12 months of your Medicare coverage?  No    Healthy Habits:     In general, how would you rate your overall health?  Good    Frequency of exercise:  6-7 days/week    Duration of exercise:  30-45 minutes    Do you usually eat at least 4 servings of fruit and vegetables a day, include whole grains    & fiber and avoid regularly eating high fat or \"junk\" foods?  Yes    Taking medications regularly:  Yes    Medication side effects:  None    Ability to successfully perform activities of daily living:  No assistance needed    Home Safety:  Lack of grab bars in the bathroom    Hearing Impairment:  Difficulty following a conversation in a noisy restaurant or crowded room    In the past 6 months, have you been bothered by leaking of urine?  No    In general, how would you rate your overall mental or emotional health?  Good      PHQ-2 Total Score: 0    Additional concerns today:  No    Do you feel safe in your environment? Yes    Have you ever done Advance Care Planning? (For example, a Health Directive, POLST, or a discussion with a medical provider or your loved ones about your wishes): Yes, advance care planning is on file.       Fall risk  Fallen 2 or more times in the past year?: No  Any fall with injury in the past year?: No    Cognitive Screening   1) Repeat 3 items (Leader, Season, Table)    2) Clock draw: NORMAL  3) 3 item recall: Recalls 3 objects  Results: 3 items recalled: COGNITIVE IMPAIRMENT LESS LIKELY    Mini-CogTM Diane Juan. Licensed by the author for use in [facility]; reprinted with permission (main@.[facility]). All rights reserved.      Do you have sleep apnea, excessive snoring or daytime drowsiness?: no    Reviewed and updated as needed this visit by clinical staff             "     Reviewed and updated as needed this visit by Provider                Social History     Tobacco Use     Smoking status: Never Smoker     Smokeless tobacco: Never Used   Substance Use Topics     Alcohol use: No     If you drink alcohol do you typically have >3 drinks per day or >7 drinks per week? Not applicable    Alcohol Use 5/18/2021   Prescreen: >3 drinks/day or >7 drinks/week? Not Applicable   Prescreen: >3 drinks/day or >7 drinks/week? -           Hyperlipidemia Follow-Up      Are you regularly taking any medication or supplement to lower your cholesterol?   Yes- atorvastatin    Are you having muscle aches or other side effects that you think could be caused by your cholesterol lowering medication?  No    Hypertension Follow-up      Do you check your blood pressure regularly outside of the clinic? No     Are you following a low salt diet? No    Are your blood pressures ever more than 140 on the top number (systolic) OR more   than 90 on the bottom number (diastolic), for example 140/90? No    Depression and Anxiety Follow-Up    How are you doing with your depression since your last visit? No change - stable    How are you doing with your anxiety since your last visit?  No change    Are you having other symptoms that might be associated with depression or anxiety? No    Have you had a significant life event? No     Do you have any concerns with your use of alcohol or other drugs? No    Social History     Tobacco Use     Smoking status: Never Smoker     Smokeless tobacco: Never Used   Substance Use Topics     Alcohol use: No     Drug use: No     PHQ 6/5/2019 2/4/2020 7/27/2020   PHQ-9 Total Score 1 2 2   Q9: Thoughts of better off dead/self-harm past 2 weeks Not at all Not at all Not at all     NATHALIA-7 SCORE 7/24/2019 7/27/2020   Total Score 1 0     Last PHQ-9 7/27/2020   1.  Little interest or pleasure in doing things 0   2.  Feeling down, depressed, or hopeless 0   3.  Trouble falling or staying asleep, or  sleeping too much 1   4.  Feeling tired or having little energy 1   5.  Poor appetite or overeating 0   6.  Feeling bad about yourself 0   7.  Trouble concentrating 0   8.  Moving slowly or restless 0   Q9: Thoughts of better off dead/self-harm past 2 weeks 0   PHQ-9 Total Score 2   Difficulty at work, home, or with people Not difficult at all     NATHALIA-7  7/27/2020   1. Feeling nervous, anxious, or on edge 0   2. Not being able to stop or control worrying 0   3. Worrying too much about different things 0   4. Trouble relaxing 0   5. Being so restless that it is hard to sit still 0   6. Becoming easily annoyed or irritable 0   7. Feeling afraid, as if something awful might happen 0   NATHALIA-7 Total Score 0   If you checked any problems, how difficult have they made it for you to do your work, take care of things at home, or get along with other people? Not difficult at all       Suicide Assessment Five-step Evaluation and Treatment (SAFE-T)      Current providers sharing in care for this patient include:   Patient Care Team:  Lucas Tompkins MD as PCP - General (Family Practice)  Lucas Tompkins MD as Assigned PCP    The following health maintenance items are reviewed in Epic and correct as of today:  Health Maintenance Due   Topic Date Due     ANNUAL REVIEW OF HM ORDERS  Never done     AORTIC ANEURYSM SCREENING (SYSTEM ASSIGNED)  Never done     COLORECTAL CANCER SCREENING  03/02/2020     PHQ-9  01/27/2021     LIPID  02/04/2021     FALL RISK ASSESSMENT  05/13/2021     MEDICARE ANNUAL WELLNESS VISIT  05/13/2021     Lab work is in process  Patient Active Problem List   Diagnosis     Hypertension goal BP (blood pressure) < 140/90     ADHD (attention deficit hyperactivity disorder)     Depression, major, recurrent, in complete remission (H)     Insomnia     Hyperlipidemia LDL goal <100     BPH (benign prostatic hyperplasia)     Spinal stenosis of cervical region     ACP (advance care planning)     Anxiety      "Back muscle spasm     Gastroesophageal reflux disease without esophagitis     Screening for prostate cancer     Mandibular hypoplasia     Skin tag     Acquired hypothyroidism     Past Surgical History:   Procedure Laterality Date     APPENDECTOMY       CARPAL TUNNEL RELEASE RT/LT      bilateral     CATARACT IOL, RT/LT Bilateral 2016     COSMETIC OSTEOTOMY SAGITTAL SPLIT WITH MANDIBLE ADVANCEMENT N/A 11/29/2017    Procedure: COSMETIC OSTEOTOMY SAGITTAL SPLIT WITH MANDIBLE ADVANCEMENT;  (COSMETIC) SAGITTAL SPLIT OSTEOTOMY ;  Surgeon: Daniel Hyde DDS;  Location: [facility]     EYE SURGERY      bilateral strabismus     HERNIA REPAIR      umbilical     ORTHOPEDIC SURGERY      left knee meniscus       Social History     Tobacco Use     Smoking status: Never Smoker     Smokeless tobacco: Never Used   Substance Use Topics     Alcohol use: No     Family History   Problem Relation Age of Onset     Alzheimer Disease Father      C.A.D. Mother         PTCA     Thyroid Disease Mother         grave's disease     Heart Disease Mother         aortic valve replacement     Hypothyroidism Sister      Hypothyroidism Sister                  Review of Systems  Constitutional, HEENT, cardiovascular, pulmonary, GI, , musculoskeletal, neuro, skin, endocrine and psych systems are negative, except as otherwise noted.    OBJECTIVE:   There were no vitals taken for this visit. Estimated body mass index is 29.65 kg/m  as calculated from the following:    Height as of 2/4/20: 1.727 m (5' 8\").    Weight as of 2/4/20: 88.5 kg (195 lb).  Physical Exam  GENERAL: healthy, alert and no distress  EYES: Eyes grossly normal to inspection, PERRL and conjunctivae and sclerae normal  HENT: ear canals and TM's normal, nose and mouth without ulcers or lesions  NECK: no adenopathy, no asymmetry, masses, or scars and thyroid normal to palpation  RESP: lungs clear to auscultation - no rales, rhonchi or wheezes  CV: regular rate and rhythm, normal S1 S2, no S3 or " S4, no murmur, click or rub, no peripheral edema and peripheral pulses strong  ABDOMEN: soft, nontender, no hepatosplenomegaly, no masses and bowel sounds normal   (male): normal male genitalia without lesions or urethral discharge, no hernia  RECTAL: normal sphincter tone, no rectal masses, prostate normal size, smooth, nontender without nodules or masses  MS: no gross musculoskeletal defects noted, no edema  SKIN: no suspicious lesions or rashes  NEURO: Normal strength and tone, mentation intact and speech normal  PSYCH: mentation appears normal, affect normal/bright  LYMPH: no cervical, supraclavicular, axillary, or inguinal adenopathy        ASSESSMENT / PLAN:       ICD-10-CM    1. Routine medical exam  Z00.00    2. Hyperlipidemia LDL goal <100  E78.5 Lipid Profile     atorvastatin (LIPITOR) 80 MG tablet   3. Hypertension goal BP (blood pressure) < 140/90  I10 UA with Microscopic     CBC with platelets differential     Comprehensive metabolic panel   4. Acquired hypothyroidism  E03.9 TSH     T4 FREE   5. Screening for prostate cancer  Z12.5 Prostate spec antigen screen   6. Depression, major, recurrent, in complete remission (H)  F33.42    7. Anxiety  F41.9    8. Gastroesophageal reflux disease without esophagitis  K21.9    9. Attention deficit hyperactivity disorder (ADHD), predominantly inattentive type  F90.0    10. Primary insomnia  F51.01 doxepin (SINEQUAN) 25 MG capsule   11. Benign prostatic hyperplasia without lower urinary tract symptoms  N40.0 dutasteride (AVODART) 0.5 MG capsule   12. Essential hypertension with goal blood pressure less than 140/90  I10 lisinopril (ZESTRIL) 10 MG tablet       Patient has been advised of split billing requirements and indicates understanding: Yes  COUNSELING:  Reviewed preventive health counseling, as reflected in patient instructions       Regular exercise       Healthy diet/nutrition       Immunizations    Are up-to-date        Estimated body mass index is 29.65  "kg/m  as calculated from the following:    Height as of 2/4/20: 1.727 m (5' 8\").    Weight as of 2/4/20: 88.5 kg (195 lb).        He reports that he has never smoked. He has never used smokeless tobacco.      Appropriate preventive services were discussed with this patient, including applicable screening as appropriate for cardiovascular disease, diabetes, osteopenia/osteoporosis, and glaucoma.  As appropriate for age/gender, discussed screening for colorectal cancer, prostate cancer, breast cancer, and cervical cancer. Checklist reviewing preventive services available has been given to the patient.    Reviewed patients plan of care and provided an AVS. The Basic Care Plan (routine screening as documented in Health Maintenance) for Stuart meets the Care Plan requirement. This Care Plan has been established and reviewed with the Patient.    Counseling Resources:  ATP IV Guidelines  Pooled Cohorts Equation Calculator  Breast Cancer Risk Calculator  Breast Cancer: Medication to Reduce Risk  FRAX Risk Assessment  ICSI Preventive Guidelines  Dietary Guidelines for Americans, 2010  [employer] MyPlate  ASA Prophylaxis  Lung CA Screening    Lucas Tompkins MD  [facility]    Identified Health Risks:  "

## 2021-05-20 LAB
ALBUMIN SERPL-MCNC: 4.3 G/DL (ref 3.4–5)
ALP SERPL-CCNC: 68 U/L (ref 40–150)
ALT SERPL W P-5'-P-CCNC: 62 U/L (ref 0–70)
ANION GAP SERPL CALCULATED.3IONS-SCNC: 3 MMOL/L (ref 3–14)
AST SERPL W P-5'-P-CCNC: 38 U/L (ref 0–45)
BILIRUB SERPL-MCNC: 0.8 MG/DL (ref 0.2–1.3)
BUN SERPL-MCNC: 14 MG/DL (ref 7–30)
CALCIUM SERPL-MCNC: 9 MG/DL (ref 8.5–10.1)
CHLORIDE SERPL-SCNC: 104 MMOL/L (ref 94–109)
CHOLEST SERPL-MCNC: 174 MG/DL
CO2 SERPL-SCNC: 28 MMOL/L (ref 20–32)
CREAT SERPL-MCNC: 0.88 MG/DL (ref 0.66–1.25)
GFR SERPL CREATININE-BSD FRML MDRD: 86 ML/MIN/{1.73_M2}
GLUCOSE SERPL-MCNC: 83 MG/DL (ref 70–99)
HDLC SERPL-MCNC: 55 MG/DL
LDLC SERPL CALC-MCNC: 90 MG/DL
NONHDLC SERPL-MCNC: 119 MG/DL
POTASSIUM SERPL-SCNC: 4.1 MMOL/L (ref 3.4–5.3)
PROT SERPL-MCNC: 7 G/DL (ref 6.8–8.8)
PSA SERPL-ACNC: 4.37 UG/L (ref 0–4)
SODIUM SERPL-SCNC: 135 MMOL/L (ref 133–144)
T4 FREE SERPL-MCNC: 0.85 NG/DL (ref 0.76–1.46)
TRIGL SERPL-MCNC: 145 MG/DL
TSH SERPL DL<=0.005 MIU/L-ACNC: 3.36 MU/L (ref 0.4–4)

## 2021-05-20 RX ORDER — ATORVASTATIN CALCIUM 80 MG/1
80 TABLET, FILM COATED ORAL DAILY
Qty: 90 TABLET | Refills: 1 | Status: SHIPPED | OUTPATIENT
Start: 2021-05-20 | End: 2021-12-06

## 2021-05-20 RX ORDER — DOXEPIN HYDROCHLORIDE 25 MG/1
CAPSULE ORAL
Qty: 270 CAPSULE | Refills: 1 | Status: SHIPPED | OUTPATIENT
Start: 2021-05-20 | End: 2021-12-06

## 2021-05-20 RX ORDER — LISINOPRIL 10 MG/1
10 TABLET ORAL DAILY
Qty: 90 TABLET | Refills: 1 | Status: SHIPPED | OUTPATIENT
Start: 2021-05-20 | End: 2021-12-06

## 2021-05-20 RX ORDER — DUTASTERIDE 0.5 MG/1
1 CAPSULE, LIQUID FILLED ORAL DAILY
Qty: 90 CAPSULE | Refills: 1 | Status: SHIPPED | OUTPATIENT
Start: 2021-05-20 | End: 2021-12-06

## 2021-06-06 ENCOUNTER — MYC MEDICAL ADVICE (OUTPATIENT)
Dept: INTERNAL MEDICINE | Facility: CLINIC | Age: 71
End: 2021-06-06

## 2021-06-06 DIAGNOSIS — L30.9 ECZEMA, UNSPECIFIED TYPE: Primary | ICD-10-CM

## 2021-06-07 RX ORDER — CLOBETASOL PROPIONATE 0.5 MG/G
CREAM TOPICAL 2 TIMES DAILY
Qty: 15 G | Refills: 1 | Status: SHIPPED | OUTPATIENT
Start: 2021-06-07

## 2021-06-07 NOTE — TELEPHONE ENCOUNTER
Clobetasol for occasional flare ups of  ECZEMA.      Routing refill request to provider for review/approval because:  Drug not active on patient's medication list

## 2021-07-31 DIAGNOSIS — F33.42 MAJOR DEPRESSIVE DISORDER, RECURRENT EPISODE, IN FULL REMISSION (H): ICD-10-CM

## 2021-08-02 RX ORDER — VENLAFAXINE HYDROCHLORIDE 150 MG/1
CAPSULE, EXTENDED RELEASE ORAL
Qty: 180 CAPSULE | Refills: 0 | Status: SHIPPED | OUTPATIENT
Start: 2021-08-02 | End: 2021-11-17

## 2021-08-02 NOTE — TELEPHONE ENCOUNTER
Routing refill request to provider for review/approval because:  PHQ 6/5/2019 2/4/2020 7/27/2020   PHQ-9 Total Score 1 2 2   Q9: Thoughts of better off dead/self-harm past 2 weeks Not at all Not at all Not at all         Lisha Juan, RN  [facility]

## 2021-08-20 ENCOUNTER — MYC REFILL (OUTPATIENT)
Dept: INTERNAL MEDICINE | Facility: CLINIC | Age: 71
End: 2021-08-20

## 2021-08-20 DIAGNOSIS — F90.0 ATTENTION DEFICIT HYPERACTIVITY DISORDER (ADHD), PREDOMINANTLY INATTENTIVE TYPE: ICD-10-CM

## 2021-08-22 RX ORDER — LISDEXAMFETAMINE DIMESYLATE 70 MG/1
70 CAPSULE ORAL DAILY
Qty: 90 CAPSULE | Refills: 0 | Status: SHIPPED | OUTPATIENT
Start: 2021-08-22 | End: 2021-11-17

## 2021-10-01 ENCOUNTER — TRANSFERRED RECORDS (OUTPATIENT)
Dept: MULTI SPECIALTY CLINIC | Facility: CLINIC | Age: 71
End: 2021-10-01

## 2021-10-24 ENCOUNTER — HEALTH MAINTENANCE LETTER (OUTPATIENT)
Age: 71
End: 2021-10-24

## 2021-11-02 DIAGNOSIS — F33.42 MAJOR DEPRESSIVE DISORDER, RECURRENT EPISODE, IN FULL REMISSION (H): ICD-10-CM

## 2021-11-02 RX ORDER — VENLAFAXINE HYDROCHLORIDE 150 MG/1
CAPSULE, EXTENDED RELEASE ORAL
Qty: 180 CAPSULE | Refills: 0 | OUTPATIENT
Start: 2021-11-02

## 2021-11-02 NOTE — TELEPHONE ENCOUNTER
Routing refill request to provider for review/approval because:  PHQ-9 score:    PHQ 7/27/2020   PHQ-9 Total Score 2   Q9: Thoughts of better off dead/self-harm past 2 weeks Not at all       Patient needs to establish care with a new PCP         Nessa Brown, RN

## 2021-11-02 NOTE — LETTER
DELF[facility] [address] [address]  [phone]  November 5, 2021  Stuart Moran  [address]  [place] [address]    Dear Stuart,    I am contacting you regarding the refill request we received for you. After reviewing your chart it looks like you are overdue  for a med check. Please call [phone] or schedule this through my chart to continue to receive refills. If you anticipate running out before your appointment let us know and we can send in a hollis refill.       Thank you,     [facility]LF[facility] nursing staff

## 2021-11-17 ENCOUNTER — MYC REFILL (OUTPATIENT)
Dept: INTERNAL MEDICINE | Facility: CLINIC | Age: 71
End: 2021-11-17
Payer: COMMERCIAL

## 2021-11-17 DIAGNOSIS — F33.42 MAJOR DEPRESSIVE DISORDER, RECURRENT EPISODE, IN FULL REMISSION (H): ICD-10-CM

## 2021-11-17 DIAGNOSIS — F90.0 ATTENTION DEFICIT HYPERACTIVITY DISORDER (ADHD), PREDOMINANTLY INATTENTIVE TYPE: ICD-10-CM

## 2021-11-18 RX ORDER — LISDEXAMFETAMINE DIMESYLATE 70 MG/1
70 CAPSULE ORAL DAILY
Qty: 30 CAPSULE | Refills: 0 | Status: SHIPPED | OUTPATIENT
Start: 2021-11-18 | End: 2021-12-06 | Stop reason: DRUGHIGH

## 2021-11-18 RX ORDER — VENLAFAXINE HYDROCHLORIDE 150 MG/1
150 CAPSULE, EXTENDED RELEASE ORAL DAILY
Qty: 180 CAPSULE | Refills: 0 | Status: SHIPPED | OUTPATIENT
Start: 2021-11-18 | End: 2021-12-06

## 2021-11-18 NOTE — TELEPHONE ENCOUNTER
RX for venlafaxine sent to his mail order [facility].     I have serious questions as to prescribing the max dose Vyvanse in a 71 year old patient, especailly one with history of HJTN< insomnia, and anxiety.   1 month prescription sent electronically to the specified pharmacy.     RXs will be re-evaluated at the time of his upcoming appointment.

## 2021-11-21 ENCOUNTER — MYC REFILL (OUTPATIENT)
Dept: INTERNAL MEDICINE | Facility: CLINIC | Age: 71
End: 2021-11-21
Payer: COMMERCIAL

## 2021-11-21 DIAGNOSIS — F90.0 ATTENTION DEFICIT HYPERACTIVITY DISORDER (ADHD), PREDOMINANTLY INATTENTIVE TYPE: ICD-10-CM

## 2021-11-23 RX ORDER — LISDEXAMFETAMINE DIMESYLATE 70 MG/1
70 CAPSULE ORAL DAILY
Qty: 30 CAPSULE | Refills: 0 | OUTPATIENT
Start: 2021-11-23

## 2021-11-23 NOTE — TELEPHONE ENCOUNTER
lisdexamfetamine (VYVANSE) 70 MG capsule 30 capsule 0 11/18/2021  No   Sig - Route: Take 1 capsule (70 mg) by mouth daily - Oral   Sent to pharmacy as: Lisdexamfetamine Dimesylate 70 MG Oral Capsule (Vyvanse)   Class: E-Prescribe   Earliest Fill Date: 11/18/2021   Order: [id number]   E-Prescribing Status: Receipt confirmed by pharmacy (11/18/2021  1:33 PM

## 2021-11-23 NOTE — TELEPHONE ENCOUNTER
Routing refill request to provider for review/approval because:  Drug not on the [facility] refill protocol   Appointment scheduled on 12/6    Vasiliy Carlos RN  [facility] Triage Nurse

## 2021-11-30 ASSESSMENT — ANXIETY QUESTIONNAIRES
GAD7 TOTAL SCORE: 2
6. BECOMING EASILY ANNOYED OR IRRITABLE: NOT AT ALL
7. FEELING AFRAID AS IF SOMETHING AWFUL MIGHT HAPPEN: NOT AT ALL
3. WORRYING TOO MUCH ABOUT DIFFERENT THINGS: SEVERAL DAYS
5. BEING SO RESTLESS THAT IT IS HARD TO SIT STILL: NOT AT ALL
8. IF YOU CHECKED OFF ANY PROBLEMS, HOW DIFFICULT HAVE THESE MADE IT FOR YOU TO DO YOUR WORK, TAKE CARE OF THINGS AT HOME, OR GET ALONG WITH OTHER PEOPLE?: SOMEWHAT DIFFICULT
4. TROUBLE RELAXING: NOT AT ALL
GAD7 TOTAL SCORE: 2
7. FEELING AFRAID AS IF SOMETHING AWFUL MIGHT HAPPEN: NOT AT ALL
GAD7 TOTAL SCORE: 2
1. FEELING NERVOUS, ANXIOUS, OR ON EDGE: SEVERAL DAYS
2. NOT BEING ABLE TO STOP OR CONTROL WORRYING: NOT AT ALL

## 2021-11-30 ASSESSMENT — PATIENT HEALTH QUESTIONNAIRE - PHQ9: SUM OF ALL RESPONSES TO PHQ QUESTIONS 1-9: 5

## 2021-12-01 ENCOUNTER — TELEPHONE (OUTPATIENT)
Dept: INTERNAL MEDICINE | Facility: CLINIC | Age: 71
End: 2021-12-01
Payer: COMMERCIAL

## 2021-12-01 ASSESSMENT — ANXIETY QUESTIONNAIRES: GAD7 TOTAL SCORE: 2

## 2021-12-01 NOTE — TELEPHONE ENCOUNTER
Central Prior Authorization Team   Phone: [phone]    PA Initiation    Medication: VYVANSE 70 MG capsule  Insurance Company: [employer] - Phone [phone] Fax [phone]  Pharmacy Filling the Rx: [facility] - [place], [place] - [address] AT PORTAL TO REGISTERED [facility] SITES  Filling Pharmacy Phone: [phone]  Filling Pharmacy Fax:    Start Date: 12/1/2021

## 2021-12-01 NOTE — TELEPHONE ENCOUNTER
Prior Authorization Not Needed per Insurance    Medication: venlafaxine (EFFEXOR-XR) 150 MG 24 hr capsule  Insurance Company: [employer] - Phone [phone] Fax [phone]  Expected CoPay:      [facility] the Rx[facility] [place], [place] - [address] AT PORTAL TO REGISTERED [facility] SITES  Pharmacy Notified: Yes  Patient Notified: No    Medication is covered under plan.  Called [facility]

## 2021-12-01 NOTE — TELEPHONE ENCOUNTER
Prior Authorization Retail Medication Request    Medication/Dose: venlafaxine (EFFEXOR-XR) 150 MG 24 hr capsule  ICD code (if different than what is on RX):    Previously Tried and Failed:    Rationale:      Insurance Name:  [facility]  Insurance ID:  [id number]        Pharmacy Information (if different than what is on RX)  [facility]   Phone:  [phone]

## 2021-12-01 NOTE — TELEPHONE ENCOUNTER
Prior Authorization Retail Medication Request    Medication/Dose: VYVANSE 70 MG capsule  ICD code (if different than what is on RX):    Previously Tried and Failed:    Rationale:      Insurance Name:  [employer]  Insurance ID:  [id number]      Pharmacy Information (if different than what is on RX)  Name:  [facility]   Phone:  [phone]

## 2021-12-01 NOTE — TELEPHONE ENCOUNTER
Central Prior Authorization Team   Phone: [phone]    PA Initiation    Medication: venlafaxine (EFFEXOR-XR) 150 MG 24 hr capsule  Insurance Company: [employer] - Phone [phone] Fax [phone]  Pharmacy Filling the Rx: [facility] - [place], [place] - [address] AT PORTAL TO REGISTERED [facility] SITES  Filling Pharmacy Phone: [phone]  Filling Pharmacy Fax:    Start Date: 12/1/2021

## 2021-12-02 NOTE — TELEPHONE ENCOUNTER
Prior Authorization Approval    Authorization Effective Date: 9/2/2021  Authorization Expiration Date: 12/1/2022  Medication: VYVANSE 70 MG capsule  Approved Dose/Quantity:   Reference #:     Insurance Company: [employer] - Phone [phone] Fax [phone]  Expected CoPay:       CoPay Card Available:      Foundation Assistance Needed:    Which Pharmacy is filling the prescription (Not needed for infusion/clinic administered): [facility] - [place], [place] - [facility] [address] AT PORTAL TO REGISTERED [facility]  Pharmacy Notified: No  Patient Notified: Yes

## 2021-12-02 NOTE — TELEPHONE ENCOUNTER
Prior Authorization Not Needed per Insurance    Medication: venlafaxine (EFFEXOR-XR) 150 MG 24 hr capsule  Insurance Company: [employer] - Phone [phone] Fax [phone]  Expected CoPay:      Pharmacy Filling the Rx: [facility] - [place], [place] - [address] AT PORTAL TO REGISTERED [facility]  Pharmacy Notified: Yes  Patient Notified: No    Called [facility] Rx mailorder service and confirmed with technician that this medication does NOT need a prior authorization. Patient received a shipment of this medication on 11/19.      No further action needed, no prior authorization required.

## 2021-12-06 ENCOUNTER — OFFICE VISIT (OUTPATIENT)
Dept: INTERNAL MEDICINE | Facility: CLINIC | Age: 71
End: 2021-12-06
Payer: MEDICARE

## 2021-12-06 VITALS
TEMPERATURE: 97.2 F | OXYGEN SATURATION: 100 % | HEART RATE: 70 BPM | WEIGHT: 188.4 LBS | HEIGHT: 68 IN | SYSTOLIC BLOOD PRESSURE: 135 MMHG | BODY MASS INDEX: 28.55 KG/M2 | RESPIRATION RATE: 14 BRPM | DIASTOLIC BLOOD PRESSURE: 65 MMHG

## 2021-12-06 DIAGNOSIS — N40.0 BENIGN PROSTATIC HYPERPLASIA WITHOUT LOWER URINARY TRACT SYMPTOMS: ICD-10-CM

## 2021-12-06 DIAGNOSIS — F33.42 MAJOR DEPRESSIVE DISORDER, RECURRENT EPISODE, IN FULL REMISSION (H): ICD-10-CM

## 2021-12-06 DIAGNOSIS — Z13.6 CARDIOVASCULAR SCREENING; LDL GOAL LESS THAN 100: ICD-10-CM

## 2021-12-06 DIAGNOSIS — E03.8 SUBCLINICAL HYPOTHYROIDISM: ICD-10-CM

## 2021-12-06 DIAGNOSIS — I10 ESSENTIAL HYPERTENSION WITH GOAL BLOOD PRESSURE LESS THAN 140/90: Chronic | ICD-10-CM

## 2021-12-06 DIAGNOSIS — F51.01 PRIMARY INSOMNIA: ICD-10-CM

## 2021-12-06 DIAGNOSIS — Z12.5 SCREENING FOR PROSTATE CANCER: ICD-10-CM

## 2021-12-06 DIAGNOSIS — F90.0 ATTENTION DEFICIT HYPERACTIVITY DISORDER (ADHD), PREDOMINANTLY INATTENTIVE TYPE: Primary | ICD-10-CM

## 2021-12-06 DIAGNOSIS — E78.5 HYPERLIPIDEMIA LDL GOAL <100: Chronic | ICD-10-CM

## 2021-12-06 PROCEDURE — 99214 OFFICE O/P EST MOD 30 MIN: CPT | Performed by: INTERNAL MEDICINE

## 2021-12-06 RX ORDER — VENLAFAXINE HYDROCHLORIDE 150 MG/1
300 CAPSULE, EXTENDED RELEASE ORAL DAILY
Qty: 180 CAPSULE | Refills: 1 | Status: SHIPPED | OUTPATIENT
Start: 2021-12-06 | End: 2022-06-16

## 2021-12-06 RX ORDER — LISDEXAMFETAMINE DIMESYLATE 50 MG/1
50 CAPSULE ORAL EVERY MORNING
Qty: 90 CAPSULE | Refills: 0 | Status: SHIPPED | OUTPATIENT
Start: 2021-12-06 | End: 2022-03-13

## 2021-12-06 RX ORDER — LISDEXAMFETAMINE DIMESYLATE 70 MG/1
70 CAPSULE ORAL DAILY
Qty: 90 CAPSULE | Refills: 0 | Status: CANCELLED | OUTPATIENT
Start: 2021-12-06

## 2021-12-06 RX ORDER — DUTASTERIDE 0.5 MG/1
1 CAPSULE, LIQUID FILLED ORAL DAILY
Qty: 90 CAPSULE | Refills: 1 | Status: SHIPPED | OUTPATIENT
Start: 2021-12-06 | End: 2022-06-16

## 2021-12-06 RX ORDER — DOXEPIN HYDROCHLORIDE 25 MG/1
75 CAPSULE ORAL AT BEDTIME
Qty: 270 CAPSULE | Refills: 1 | Status: SHIPPED | OUTPATIENT
Start: 2021-12-06 | End: 2022-06-28

## 2021-12-06 RX ORDER — LISINOPRIL 10 MG/1
10 TABLET ORAL DAILY
Qty: 90 TABLET | Refills: 3 | Status: SHIPPED | OUTPATIENT
Start: 2021-12-06 | End: 2022-11-07

## 2021-12-06 RX ORDER — ATORVASTATIN CALCIUM 80 MG/1
80 TABLET, FILM COATED ORAL DAILY
Qty: 90 TABLET | Refills: 1 | Status: SHIPPED | OUTPATIENT
Start: 2021-12-06 | End: 2022-06-16

## 2021-12-06 ASSESSMENT — PATIENT HEALTH QUESTIONNAIRE - PHQ9
10. IF YOU CHECKED OFF ANY PROBLEMS, HOW DIFFICULT HAVE THESE PROBLEMS MADE IT FOR YOU TO DO YOUR WORK, TAKE CARE OF THINGS AT HOME, OR GET ALONG WITH OTHER PEOPLE: SOMEWHAT DIFFICULT
SUM OF ALL RESPONSES TO PHQ QUESTIONS 1-9: 3
SUM OF ALL RESPONSES TO PHQ QUESTIONS 1-9: 3

## 2021-12-06 ASSESSMENT — MIFFLIN-ST. JEOR: SCORE: 1584.08

## 2021-12-06 NOTE — LETTER
[facility]  12/06/21  Patient: Stuart Moran  YOB: 1950  Medical Record Number: [id number]                                                                                  Non-Opioid Controlled Substance Agreement    This is an agreement between you and your provider regarding safe and appropriate use of controlled substances prescribed by your care team. Controlled substances are?medicines that can cause physical and mental dependence (abuse).     There are strict laws about having and using these medicines. We here at [facility] are  committed to working with you in your efforts to get better. To support you in this work, we'll help you schedule regular office appointments for medicine refills. If we must cancel or change your appointment for any reason, we'll make sure you have enough medicine to last until your next appointment.     As a Provider, I will:     Listen carefully to your concerns while treating you with respect.     Recommend a treatment plan that I believe is in your best interest and may involve therapies other than medicine.      Talk with you often about the possible benefits and the risk of harm of any medicine that we prescribe for you.    Assess the safety of this medicine and check how well it works.      Provide a plan on how to taper (discontinue or go off) using this medicine if the decision is made to stop its use.      ::  As a Patient, I understand controlled substances:       Are prescribed by my care provider to help me function or work and manage my condition(s).?    Are strong medicines and can cause serious side effects.       Need to be taken exactly as prescribed.?Combining controlled substances with certain medicines or chemicals (such as illegal drugs, alcohol, sedatives, sleeping pills, and benzodiazepines) can be dangerous or even fatal.? If I stop taking my medicines suddenly, I may have severe withdrawal symptoms.     The risks,  benefits, and side effects of these medicine(s) were explained to me. I agree that:    1. I will take part in other treatments as advised by my care team. This may be psychiatry or counseling, physical therapy, behavioral therapy, group treatment or a referral to specialist.    2. I will keep all my appointments and understand this is part of the monitoring of controlled substances.?My care team may require an office visit for EVERY controlled substance refill. If I miss appointments or don t follow instructions, my care team may stop my medicine    3. I will take my medicines as prescribed. I will not change the dose or schedule unless my care team tells me to. There will be no refills if I run out early.      4. I may be asked to come to the clinic and complete a urine drug test or complete a pill count. If I don t give a urine sample or participate in a pill count, the care team may stop my medicine.    5. I will only receive controlled substance prescriptions from this clinic. If I am treated by another provider, I will tell them that I am taking controlled substances and that I have a treatment agreement with this provider. I will inform my [facility] care team within one business day if I am given a prescription for any controlled substance by another healthcare provider. My [facility] care team can contact other providers and pharmacists about my use of any medicines.    6. It is up to me to make sure that I don't run out of my medicines on weekends or holidays.?If my care team is willing to refill my prescription without a visit, I must request refills only during office hours. Refills may take up to 3 business days to process. I will use one pharmacy to fill all my controlled substance prescriptions. I will notify the clinic about any changes to my insurance or medicine availability.    7. I am responsible for my prescriptions. If the medicine/prescription is lost, stolen or destroyed, it will  not be replaced.?I also agree not to share controlled substance medicines with anyone.     8. I am aware I should not use any illegal or recreational drugs. I agree not to drink alcohol unless my care team says I can.     9. If I enroll in the [place] Medical Cannabis program, I will tell my care team before my next refill.    10. I will tell my care team right away if I become pregnant, have a new medical problem treated outside of my regular clinic, or have a change in my medicines.     11. I understand that this medicine can affect my thinking, judgment and reaction time.? Alcohol and drugs affect the brain and body, which can affect the safety of my driving. Being under the influence of alcohol or drugs can affect my decision-making, behaviors, personal safety and the safety of others. Driving while impaired (DWI) can occur if a person is driving, operating or in physical control of a car, motorcycle, boat, snowmobile, ATV, motorbike, off-road vehicle or any other motor vehicle ([place] Statute 169A.20). I understand the risk if I choose to drive or operate any vehicle or machinery.    I understand that if I do not follow any of the conditions above, my prescriptions or treatment may be stopped or changed.   I agree that my provider, clinic care team and pharmacy may work with any city, state or federal law enforcement agency that investigates the misuse, sale or other diversion of my controlled medicine. I will allow my provider to discuss my care with, or share a copy of, this agreement with any other treating provider, pharmacy or emergency room where I receive care.     I have read this agreement and have asked questions about anything I did not understand.    ________________________________________________________  Patient Signature - Stuart Moran     ___________________                   Date     ________________________________________________________  Provider Signature - Marcelo Moseley MD        ___________________                   Date     ________________________________________________________  Witness Signature (required if provider not present while patient signing)          ___________________                   Date

## 2021-12-06 NOTE — PATIENT INSTRUCTIONS
"*  Reduce the dose of Vyvanse to 50 mg once per day    *  Continue all other medications at the same doses.  Contact your usual pharmacy if you need refills.         5 GOALS TO PREVENT VASCULAR DISEASE:     1.  Aggressive blood pressure control, under 130/80 ideally.  Using medications if needed.    Your blood pressure is under good control    BP Readings from Last 4 Encounters:   12/06/21 135/65   05/19/21 138/66   02/04/20 122/76   08/26/19 130/82       2.  Aggressive LDL cholesterol (\"bad cholesterol\") lowering as indicated.    Your goal is an LDL under 130 for sure, preferably under 100.  (If you have diabetes or previous vascular disease, the the LDL goals would be under 100 for sure, preferably under 70.)    New guidelines identify four high-risk groups who could benefit from statins:   *people with pre-existing heart disease, such as those who have had a heart attack;   *people ages 40 to 75 who have diabetes of any type  *patients ages 40 to 75 with at least a 7.5% risk of developing cardiovascular disease over the next decade, according to a formula described in the guidelines  *patients with the sort of super-high cholesterol that sometimes runs in families, as evidenced by an LDL of 190 milligrams per deciliter or higher    Your cholesterol levels are well controlled.    Recent Labs   Lab Test 05/19/21  1619 02/04/20  1220 06/06/16  0844 10/15/15  0840 08/07/14  1146   CHOL 174 170   < > 178 161   HDL 55 45   < > 60 62   LDL 90 102*   < > 91 83   TRIG 145 116   < > 135 81   CHOLHDLRATIO  --   --   --  3.0 2.6    < > = values in this interval not displayed.       3.  Aggressive diabetic prevention, screening and/or management.      You do not have diabetes as of the most recent blood tests.     4.  No smoking    5.  Consider daily preventative aspirin over age 50 if you have enough cardiac risk factors to place you at higher risk for the presence of vascular disease.    If you have any reason not to take " aspirin such easy bruising or bleeding, stomach problems, other anticoagulant medications, or any other side effects, then you should not take Aspirin.     --Based on your current cardiac disease risk profile and/or age over 75, you do NOT need to take daily preventative aspirin.

## 2021-12-06 NOTE — PROGRESS NOTES
Assessment & Plan     (F90.0) Attention deficit hyperactivity disorder (ADHD), predominantly inattentive type  (primary encounter diagnosis)  Comment: ON vyvanse for many years (20+) at this max dose.   He retired years ago and does not require the same kind of concentration, and never had dose readjusted.   Reviewed the risks and benefits of taking this medication, and specifically reviewed the potential side effects including increased cardiac side effects when used in age over 65.   I strongly recommended reducing the dose to 50 mg once per day, then ever further downwards if truly does not need this medication, which I suspect is the case.     Plan: lisdexamfetamine (VYVANSE) 50 MG capsule            (E78.5) Hyperlipidemia LDL goal <100  Comment: This condition is currently controlled on the current medical regimen.  Continue current therapy.   Plan: Lipid panel reflex to direct LDL Fasting,         Comprehensive metabolic panel, atorvastatin         (LIPITOR) 80 MG tablet            (I10) Essential hypertension with goal blood pressure less than 140/90  Comment: This condition is currently controlled on the current medical regimen.  Continue current therapy.   Plan: lisinopril (ZESTRIL) 10 MG tablet,         Comprehensive metabolic panel, CBC with         platelets            (F33.42) Major depressive disorder, recurrent episode, in full remission (H)  Comment: This condition is currently controlled on the current medical regimen.  Continue current therapy.   Refill meds, he feels it is helping  Told him nbever to miss a dose.   Plan: venlafaxine (EFFEXOR-XR) 150 MG 24 hr capsule            (F51.01) Primary insomnia  Comment: has taken this medicatio for years, reported no side effects   Reviewed poetential side effects  Plan: doxepin (SINEQUAN) 25 MG capsule            (N40.0) Benign prostatic hyperplasia without lower urinary tract symptoms  Comment: This condition is currently controlled on the current  "medical regimen.  Continue current therapy.   Plan: dutasteride (AVODART) 0.5 MG capsule            (Z12.5) Screening for prostate cancer  Comment:   Plan: Prostate Specific Antigen Screen            (E03.8) Subclinical hypothyroidism  Comment: This condition is currently controlled on the current medical regimen.  Continue current therapy.   Change to 1/2 of 75 mcg tablet at next refill or ease of use.   Plan: TSH with free T4 reflex            (Z13.6) CARDIOVASCULAR SCREENING; LDL GOAL LESS THAN 100  Comment: Discussed cardiac disease risk factors and cardiac disease risk factor modification.   Plan: Lipid panel reflex to direct LDL Fasting,         Comprehensive metabolic panel, CBC with         platelets                        BMI:   Estimated body mass index is 28.65 kg/m  as calculated from the following:    Height as of this encounter: 1.727 m (5' 8\").    Weight as of this encounter: 85.5 kg (188 lb 6.4 oz).           Return in about 6 months (around 6/6/2022) for Medicare Annual Wellness Exam, Blood pressure, with pre-visit fasting labs.    Marcelo Moseley MD  [facility]    Pancho Davidson is a 71 year old who presents for the following health issues     History of Present Illness       Mental Health Follow-up:  Patient presents to follow-up on Depression & Anxiety.Patient's depression since last visit has been:  Good  The patient is having other symptoms associated with depression.  Patient's anxiety since last visit has been:  Medium  The patient is having other symptoms associated with anxiety.  Any significant life events: relationship concerns  Patient is not feeling anxious or having panic attacks.  Patient has no concerns about alcohol or drug use.     Social History  Tobacco Use    Smoking status: Never Smoker    Smokeless tobacco: Never Used  Alcohol use: No  Drug use: No      Today's PHQ-9         PHQ-9 Total Score:     (P) 3   PHQ-9 Q9 Thoughts of better " "off dead/self-harm past 2 weeks :   (P) Not at all   Thoughts of suicide or self harm:      Self-harm Plan:        Self-harm Action:          Safety concerns for self or others:           Hyperlipidemia:  He presents for follow up of hyperlipidemia.  He is taking medication to lower cholesterol. He is not having myalgia or other side effects to statin medications.    Hypertension: He presents for follow up of hypertension.  He does not check blood pressure  regularly outside of the clinic. Outside blood pressures have been over 140/90. He does not follow a low salt diet.     Hypothyroidism:     Since last visit, patient describes the following symptoms::  Anxiety, Depression and Fatigue    Migraines:   Since the patient's last clinic visit, headaches are: no change  The patient is getting headaches:  At least once a week. Duration one to three days.  He is able to do normal daily activities when he has a migraine.  The patient is taking the following rescue/relief medications:  Ibuprofen (Advil, Motrin), Naproxyn (Aleve) and Tylenol   Patient states \"I get some relief\" from the rescue/relief medications.   The patient is taking the following medications to prevent migraines:  No medications to prevent migraines  In the past 4 weeks, the patient has gone to an Urgent Care or Emergency Room 0 times times due to headaches.    He eats 4 or more servings of fruits and vegetables daily.He consumes 0 sweetened beverage(s) daily.He exercises with enough effort to increase his heart rate 30 to 60 minutes per day.  He exercises with enough effort to increase his heart rate 7 days per week.   He is taking medications regularly.     Reports takign vyvanse for many years at this dose fo 70 mg daily.   Was formerly working as  requiring intense concentration.   rertied years ago, and never sadjusted the dose of m,edication  Denies side effects from vyvnase.     Depression:  Has known history of depression.  Has " been on medication for this.  The patient does not report any significant side effects of this medication.  The prior symptoms leading to the original diagnosis and decision to start medication therapy are better.     Appetite is stable.  Sleeping patterns are stable.    No reported thoughts of suicide or homicide.    PHQ 7/27/2020 11/30/2021 12/6/2021   PHQ-9 Total Score 2 5 3   Q9: Thoughts of better off dead/self-harm past 2 weeks Not at all Not at all Not at all        2.  Elevated PSA:  Lab Results   Component Value Date    PSA 4.37 05/19/2021    PSA 3.85 08/26/2019    PSA 4.14 05/13/2019    PSA 3.48 07/25/2017    PSA 2.1 10/13/2015    PSA 2.2 08/13/2014    PSA 2.26 08/15/2013       3.    Hypertension:  History of hypertension, on medication.  No reported side effects from medications.    Reviewed last 6 BP readings in chart:  BP Readings from Last 6 Encounters:   12/06/21 135/65   05/19/21 138/66   02/04/20 122/76   08/26/19 130/82   07/24/19 124/78   05/13/19 120/74     No active cardiac complaints or symptoms with exercise.     4.  History of hypothyroidism.  On replacement therapy.  He has not experienced any significant side effects of this medication.   The patient denies of fatigue, weight changes, heat/cold intolerance, hair changes, nail changes, bowel changes.     Latest labs reviewed:    Lab Results   Component Value Date    TSH 3.36 05/19/2021    TSH 3.55 12/21/2020    TSH 4.20 10/29/2020    TSH 4.44 08/26/2020    TSH 4.80 07/09/2020    TSH 5.17 05/22/2020    TSH 5.28 02/04/2020           **I reviewed the information recorded in the patient's EPIC chart (including but not limited to medical history, surgical history, family history, problem list, medication list, and allergy list) and updated the information as indicated based on the patients reported information.         **I reviewed the information recorded in the patient's EPIC chart (including but not limited to medical history, surgical  "history, family history, problem list, medication list, and allergy list) and updated the information as indicated based on the patients reported information.         Review of Systems   Constitutional, HEENT, cardiovascular, pulmonary, gi and gu systems are negative, except as otherwise noted.      Objective    /65   Pulse 70   Temp 97.2  F (36.2  C) (Temporal)   Resp 14   Ht 1.727 m (5' 8\")   Wt 85.5 kg (188 lb 6.4 oz)   SpO2 100%   BMI 28.65 kg/m    Body mass index is 28.65 kg/m .  Physical Exam   GENERAL alert and no distress  EYES:  Normal sclera,conjunctiva, EOMI  HENT: oral and posterior pharynx without lesions or erythema, facies symmetric  NECK: Neck supple. No LAD, without thyroidmegaly.  RESP: Clear to ausculation bilaterally without wheezes or crackles. Normal BS in all fields.  CV: RRR normal S1S2 without murmurs, rubs or gallops.  LYMPH: no cervical lymph adenopathy appreciated  MS: extremities- no gross deformities of the visible extremities noted,   EXT:  no lower extremity edema  PSYCH: Alert and oriented times 3; speech- coherent  SKIN:  No obvious significant skin lesions on visible portions of face                 "

## 2021-12-07 ASSESSMENT — PATIENT HEALTH QUESTIONNAIRE - PHQ9: SUM OF ALL RESPONSES TO PHQ QUESTIONS 1-9: 3

## 2022-03-13 ENCOUNTER — MYC REFILL (OUTPATIENT)
Dept: INTERNAL MEDICINE | Facility: CLINIC | Age: 72
End: 2022-03-13
Payer: MEDICARE

## 2022-03-13 DIAGNOSIS — F90.0 ATTENTION DEFICIT HYPERACTIVITY DISORDER (ADHD), PREDOMINANTLY INATTENTIVE TYPE: ICD-10-CM

## 2022-03-17 ENCOUNTER — MYC REFILL (OUTPATIENT)
Dept: INTERNAL MEDICINE | Facility: CLINIC | Age: 72
End: 2022-03-17
Payer: MEDICARE

## 2022-03-17 DIAGNOSIS — F90.0 ATTENTION DEFICIT HYPERACTIVITY DISORDER (ADHD), PREDOMINANTLY INATTENTIVE TYPE: ICD-10-CM

## 2022-03-18 RX ORDER — LISDEXAMFETAMINE DIMESYLATE 50 MG/1
50 CAPSULE ORAL EVERY MORNING
Qty: 90 CAPSULE | Refills: 0 | Status: SHIPPED | OUTPATIENT
Start: 2022-03-18 | End: 2022-06-14

## 2022-03-18 RX ORDER — LISDEXAMFETAMINE DIMESYLATE 50 MG/1
50 CAPSULE ORAL EVERY MORNING
Qty: 90 CAPSULE | Refills: 0 | OUTPATIENT
Start: 2022-03-18

## 2022-03-18 NOTE — TELEPHONE ENCOUNTER
This refill is a duplicate of something already sent to the pharmacy or another refill already in process.   Silvia Cifuentes RN  [facility]

## 2022-05-11 DIAGNOSIS — E03.9 ACQUIRED HYPOTHYROIDISM: ICD-10-CM

## 2022-05-11 RX ORDER — LEVOTHYROXINE SODIUM 25 UG/1
TABLET ORAL
Qty: 135 TABLET | Refills: 2 | Status: SHIPPED | OUTPATIENT
Start: 2022-05-11 | End: 2022-06-28

## 2022-05-11 NOTE — TELEPHONE ENCOUNTER
Routing refill request to provider for review/approval because:  Prescription last signed under Dr. Veliz. Provider to review.

## 2022-05-11 NOTE — TELEPHONE ENCOUNTER
[facility] mail order pharmacy technician called, stated they would like to have medication refilled for patient.     Call back number is [phone] Opt 2  Reference number is [id number].

## 2022-05-17 ENCOUNTER — DOCUMENTATION ONLY (OUTPATIENT)
Dept: LAB | Facility: CLINIC | Age: 72
End: 2022-05-17
Payer: MEDICARE

## 2022-05-17 NOTE — PROGRESS NOTES
Patient scheduled appt for lab 5/21. Orders are in, but not expected until 6/6. Lab wants to confirm that it is ok to draw labs. Thank you !

## 2022-05-21 ENCOUNTER — LAB (OUTPATIENT)
Dept: LAB | Facility: CLINIC | Age: 72
End: 2022-05-21
Payer: MEDICARE

## 2022-05-21 DIAGNOSIS — I10 ESSENTIAL HYPERTENSION WITH GOAL BLOOD PRESSURE LESS THAN 140/90: Chronic | ICD-10-CM

## 2022-05-21 DIAGNOSIS — Z12.5 SCREENING FOR PROSTATE CANCER: ICD-10-CM

## 2022-05-21 DIAGNOSIS — E03.8 SUBCLINICAL HYPOTHYROIDISM: ICD-10-CM

## 2022-05-21 DIAGNOSIS — E78.5 HYPERLIPIDEMIA LDL GOAL <100: Chronic | ICD-10-CM

## 2022-05-21 DIAGNOSIS — Z13.6 CARDIOVASCULAR SCREENING; LDL GOAL LESS THAN 100: ICD-10-CM

## 2022-05-21 LAB
ALBUMIN SERPL-MCNC: 4.2 G/DL (ref 3.4–5)
ALP SERPL-CCNC: 73 U/L (ref 40–150)
ALT SERPL W P-5'-P-CCNC: 63 U/L (ref 0–70)
ANION GAP SERPL CALCULATED.3IONS-SCNC: 4 MMOL/L (ref 3–14)
AST SERPL W P-5'-P-CCNC: 37 U/L (ref 0–45)
BILIRUB SERPL-MCNC: 0.6 MG/DL (ref 0.2–1.3)
BUN SERPL-MCNC: 17 MG/DL (ref 7–30)
CALCIUM SERPL-MCNC: 9.1 MG/DL (ref 8.5–10.1)
CHLORIDE BLD-SCNC: 104 MMOL/L (ref 94–109)
CHOLEST SERPL-MCNC: 147 MG/DL
CO2 SERPL-SCNC: 29 MMOL/L (ref 20–32)
CREAT SERPL-MCNC: 0.75 MG/DL (ref 0.66–1.25)
ERYTHROCYTE [DISTWIDTH] IN BLOOD BY AUTOMATED COUNT: 12.9 % (ref 10–15)
FASTING STATUS PATIENT QL REPORTED: YES
GFR SERPL CREATININE-BSD FRML MDRD: >90 ML/MIN/1.73M2
GLUCOSE BLD-MCNC: 96 MG/DL (ref 70–99)
HCT VFR BLD AUTO: 44.1 % (ref 40–53)
HDLC SERPL-MCNC: 53 MG/DL
HGB BLD-MCNC: 14.4 G/DL (ref 13.3–17.7)
LDLC SERPL CALC-MCNC: 78 MG/DL
MCH RBC QN AUTO: 31.1 PG (ref 26.5–33)
MCHC RBC AUTO-ENTMCNC: 32.7 G/DL (ref 31.5–36.5)
MCV RBC AUTO: 95 FL (ref 78–100)
NONHDLC SERPL-MCNC: 94 MG/DL
PLATELET # BLD AUTO: 208 10E3/UL (ref 150–450)
POTASSIUM BLD-SCNC: 4.2 MMOL/L (ref 3.4–5.3)
PROT SERPL-MCNC: 7.3 G/DL (ref 6.8–8.8)
PSA SERPL-MCNC: 4.74 UG/L (ref 0–4)
RBC # BLD AUTO: 4.63 10E6/UL (ref 4.4–5.9)
SODIUM SERPL-SCNC: 137 MMOL/L (ref 133–144)
TRIGL SERPL-MCNC: 82 MG/DL
TSH SERPL DL<=0.005 MIU/L-ACNC: 3.92 MU/L (ref 0.4–4)
WBC # BLD AUTO: 5.2 10E3/UL (ref 4–11)

## 2022-05-21 PROCEDURE — G0103 PSA SCREENING: HCPCS

## 2022-05-21 PROCEDURE — 80061 LIPID PANEL: CPT

## 2022-05-21 PROCEDURE — 84443 ASSAY THYROID STIM HORMONE: CPT

## 2022-05-21 PROCEDURE — 80053 COMPREHEN METABOLIC PANEL: CPT

## 2022-05-21 PROCEDURE — 85027 COMPLETE CBC AUTOMATED: CPT

## 2022-05-21 PROCEDURE — 36415 COLL VENOUS BLD VENIPUNCTURE: CPT

## 2022-06-14 ENCOUNTER — MYC REFILL (OUTPATIENT)
Dept: INTERNAL MEDICINE | Facility: CLINIC | Age: 72
End: 2022-06-14
Payer: MEDICARE

## 2022-06-14 DIAGNOSIS — F90.0 ATTENTION DEFICIT HYPERACTIVITY DISORDER (ADHD), PREDOMINANTLY INATTENTIVE TYPE: ICD-10-CM

## 2022-06-14 DIAGNOSIS — E78.5 HYPERLIPIDEMIA LDL GOAL <100: Chronic | ICD-10-CM

## 2022-06-14 DIAGNOSIS — F33.42 MAJOR DEPRESSIVE DISORDER, RECURRENT EPISODE, IN FULL REMISSION (H): ICD-10-CM

## 2022-06-15 DIAGNOSIS — N40.0 BENIGN PROSTATIC HYPERPLASIA WITHOUT LOWER URINARY TRACT SYMPTOMS: ICD-10-CM

## 2022-06-15 RX ORDER — LISDEXAMFETAMINE DIMESYLATE 50 MG/1
50 CAPSULE ORAL EVERY MORNING
Qty: 90 CAPSULE | Refills: 0 | Status: SHIPPED | OUTPATIENT
Start: 2022-06-15 | End: 2022-06-28

## 2022-06-15 NOTE — TELEPHONE ENCOUNTER
Routing refill request to provider for review/approval because:  Drug not on the [facility] refill protocol   Faby Wheeler RN

## 2022-06-16 RX ORDER — DUTASTERIDE 0.5 MG/1
CAPSULE, LIQUID FILLED ORAL
Qty: 90 CAPSULE | Refills: 0 | Status: SHIPPED | OUTPATIENT
Start: 2022-06-16 | End: 2022-06-28

## 2022-06-16 RX ORDER — VENLAFAXINE HYDROCHLORIDE 150 MG/1
CAPSULE, EXTENDED RELEASE ORAL
Qty: 180 CAPSULE | Refills: 0 | Status: SHIPPED | OUTPATIENT
Start: 2022-06-16 | End: 2022-06-28

## 2022-06-16 RX ORDER — ATORVASTATIN CALCIUM 80 MG/1
TABLET, FILM COATED ORAL
Qty: 90 TABLET | Refills: 0 | Status: SHIPPED | OUTPATIENT
Start: 2022-06-16 | End: 2022-06-28

## 2022-06-16 NOTE — TELEPHONE ENCOUNTER
"Medication is being filled for 1 time refill only due to:  future appt with Dr. Moseley in 3 weeks     Appointments in Next Year    Jun 28, 2022  9:00 AM  (Arrive by 8:40 AM)  MEDICARE ANNUAL WELLNESS VISIT with Marcelo Moseley MD  [facility] ([facility] ) [phone]        Requested Prescriptions   Pending Prescriptions Disp Refills     dutasteride (AVODART) 0.5 MG capsule [Pharmacy Med Name: DUTASTERIDE  CAP 0.5MG] 90 capsule 1     Sig: TAKE 1 CAPSULE DAILY       BPH Agents Passed - 6/15/2022 11:57 AM        Passed - Recent (12 mo) or future (30 days) visit within the authorizing provider's department     Patient has had an office visit with the authorizing provider or a provider within the authorizing providers department within the previous 12 mos or has a future within next 30 days. See \"Patient Info\" tab in inbasket, or \"Choose Columns\" in Meds & Orders section of the refill encounter.              Passed - Medication is active on med list        Passed - Patient is 18 years of age or older             "

## 2022-06-16 NOTE — TELEPHONE ENCOUNTER
Medication is being filled for 1 time refill only due to:  future appt with Dr. Moseley in 3 weeks     Appointments in Next Year    Jun 28, 2022  9:00 AM  (Arrive by 8:40 AM)  MEDICARE ANNUAL WELLNESS VISIT with Marcelo Moseley MD  [facility] ([facility] ) [phone]

## 2022-06-28 ENCOUNTER — OFFICE VISIT (OUTPATIENT)
Dept: INTERNAL MEDICINE | Facility: CLINIC | Age: 72
End: 2022-06-28
Payer: MEDICARE

## 2022-06-28 VITALS
DIASTOLIC BLOOD PRESSURE: 64 MMHG | HEART RATE: 68 BPM | WEIGHT: 182 LBS | BODY MASS INDEX: 27.58 KG/M2 | OXYGEN SATURATION: 99 % | SYSTOLIC BLOOD PRESSURE: 132 MMHG | HEIGHT: 68 IN | TEMPERATURE: 98.2 F

## 2022-06-28 DIAGNOSIS — I10 ESSENTIAL HYPERTENSION WITH GOAL BLOOD PRESSURE LESS THAN 140/90: Chronic | ICD-10-CM

## 2022-06-28 DIAGNOSIS — F33.42 MAJOR DEPRESSIVE DISORDER, RECURRENT EPISODE, IN FULL REMISSION (H): ICD-10-CM

## 2022-06-28 DIAGNOSIS — E03.9 HYPOTHYROIDISM, UNSPECIFIED TYPE: ICD-10-CM

## 2022-06-28 DIAGNOSIS — E78.5 HYPERLIPIDEMIA LDL GOAL <100: Chronic | ICD-10-CM

## 2022-06-28 DIAGNOSIS — E03.8 SUBCLINICAL HYPOTHYROIDISM: ICD-10-CM

## 2022-06-28 DIAGNOSIS — Z00.00 ENCOUNTER FOR MEDICARE ANNUAL WELLNESS EXAM: Primary | ICD-10-CM

## 2022-06-28 DIAGNOSIS — F90.0 ATTENTION DEFICIT HYPERACTIVITY DISORDER (ADHD), PREDOMINANTLY INATTENTIVE TYPE: ICD-10-CM

## 2022-06-28 DIAGNOSIS — F51.01 PRIMARY INSOMNIA: ICD-10-CM

## 2022-06-28 DIAGNOSIS — N40.0 BENIGN PROSTATIC HYPERPLASIA WITHOUT LOWER URINARY TRACT SYMPTOMS: ICD-10-CM

## 2022-06-28 DIAGNOSIS — Z13.6 CARDIOVASCULAR SCREENING; LDL GOAL LESS THAN 130: ICD-10-CM

## 2022-06-28 PROCEDURE — 99214 OFFICE O/P EST MOD 30 MIN: CPT | Mod: 25 | Performed by: INTERNAL MEDICINE

## 2022-06-28 PROCEDURE — 99397 PER PM REEVAL EST PAT 65+ YR: CPT | Performed by: INTERNAL MEDICINE

## 2022-06-28 RX ORDER — DUTASTERIDE 0.5 MG/1
1 CAPSULE, LIQUID FILLED ORAL DAILY
Qty: 90 CAPSULE | Refills: 1 | Status: SHIPPED | OUTPATIENT
Start: 2022-06-28 | End: 2023-02-07

## 2022-06-28 RX ORDER — VENLAFAXINE HYDROCHLORIDE 150 MG/1
300 CAPSULE, EXTENDED RELEASE ORAL DAILY
Qty: 180 CAPSULE | Refills: 1 | Status: SHIPPED | OUTPATIENT
Start: 2022-06-28 | End: 2022-11-29

## 2022-06-28 RX ORDER — DOXEPIN HYDROCHLORIDE 25 MG/1
75 CAPSULE ORAL AT BEDTIME
Qty: 270 CAPSULE | Refills: 1 | Status: SHIPPED | OUTPATIENT
Start: 2022-06-28 | End: 2023-01-02

## 2022-06-28 RX ORDER — LISDEXAMFETAMINE DIMESYLATE 50 MG/1
50 CAPSULE ORAL EVERY MORNING
Qty: 90 CAPSULE | Refills: 0 | Status: SHIPPED | OUTPATIENT
Start: 2022-06-28 | End: 2022-12-07

## 2022-06-28 RX ORDER — ATORVASTATIN CALCIUM 80 MG/1
80 TABLET, FILM COATED ORAL DAILY
Qty: 90 TABLET | Refills: 1 | Status: SHIPPED | OUTPATIENT
Start: 2022-06-28 | End: 2023-02-07

## 2022-06-28 RX ORDER — LEVOTHYROXINE SODIUM 75 UG/1
37.5 TABLET ORAL DAILY
Qty: 45 TABLET | Refills: 3 | Status: SHIPPED | OUTPATIENT
Start: 2022-06-28 | End: 2023-02-07

## 2022-06-28 ASSESSMENT — ENCOUNTER SYMPTOMS
CHILLS: 0
HEADACHES: 1
CONSTIPATION: 0
WEAKNESS: 0
HEARTBURN: 0
FEVER: 0
ABDOMINAL PAIN: 0
FREQUENCY: 0
HEMATURIA: 0
DIZZINESS: 0
DIARRHEA: 0
COUGH: 0
DYSURIA: 0
PALPITATIONS: 0
SORE THROAT: 0
NERVOUS/ANXIOUS: 0
HEMATOCHEZIA: 0
EYE PAIN: 0
MYALGIAS: 0
NAUSEA: 0
JOINT SWELLING: 0
SHORTNESS OF BREATH: 0
ARTHRALGIAS: 0
PARESTHESIAS: 0

## 2022-06-28 ASSESSMENT — PATIENT HEALTH QUESTIONNAIRE - PHQ9
SUM OF ALL RESPONSES TO PHQ QUESTIONS 1-9: 1
SUM OF ALL RESPONSES TO PHQ QUESTIONS 1-9: 1
10. IF YOU CHECKED OFF ANY PROBLEMS, HOW DIFFICULT HAVE THESE PROBLEMS MADE IT FOR YOU TO DO YOUR WORK, TAKE CARE OF THINGS AT HOME, OR GET ALONG WITH OTHER PEOPLE: NOT DIFFICULT AT ALL

## 2022-06-28 ASSESSMENT — ACTIVITIES OF DAILY LIVING (ADL): CURRENT_FUNCTION: NO ASSISTANCE NEEDED

## 2022-06-28 NOTE — Clinical Note
Please abstract the following data from this visit with this patient into the appropriate field in Epic:  Tests that can be patient reported without a hard copy:  Colonoscopy done on this date: October 2021 (approximately), by this group: [facility], results were negative.

## 2022-06-28 NOTE — PROGRESS NOTES
SUBJECTIVE:   Stuart Moran is a 72 year old male who presents for Preventive Visit.        Are you in the first 12 months of your Medicare coverage?  No    Healthy Habits:   PHQ-2 Total Score: 0    Do you feel safe in your environment? Yes    Have you ever done Advance Care Planning? (For example, a Health Directive, POLST, or a discussion with a medical provider or your loved ones about your wishes):        Fall risk  Fallen 2 or more times in the past year?: No  Any fall with injury in the past year?: No    Cognitive Screening   1) Repeat 3 items (Leader, Season, Table)      2) Clock draw:   NORMAL  3) 3 item recall: Recalls 3 objects  Results: 3 items recalled: COGNITIVE IMPAIRMENT LESS LIKELY    Mini-CogTM Copyright DOUGLAS Juan. Licensed by the author for use in [place] [employer]; reprinted with permission (main@[facility]). All rights reserved.      Do you have sleep apnea, excessive snoring or daytime drowsiness?: no    Reviewed and updated as needed this visit by clinical staff   Tobacco  Allergies  Meds  Problems               Reviewed and updated as needed this visit by Provider   Tobacco  Allergies  Meds  Problems              Social History     Tobacco Use     Smoking status: Never Smoker     Smokeless tobacco: Never Used   Substance Use Topics     Alcohol use: No         Alcohol Use 6/28/2022   Prescreen: >3 drinks/day or >7 drinks/week? Not Applicable   Prescreen: >3 drinks/day or >7 drinks/week? -       1.    Hypertension:  History of hypertension, on medication.  No reported side effects from medications.    Reviewed last 6 BP readings in chart:  BP Readings from Last 6 Encounters:   06/28/22 132/64   12/06/21 135/65   05/19/21 138/66   02/04/20 122/76   08/26/19 130/82   07/24/19 124/78     No active cardiac complaints or symptoms with exercise.       2.  Hyperlipidemia:  Has history of hyperlipidemia.    The patient is taking a medication for this.  Denies any significant side effects from  his medication.      Latest labs reviewed:    Recent Labs   Lab Test 05/21/22  1004 05/19/21  1619 06/06/16  0844 10/15/15  0840 08/07/14  1146   CHOL 147 174   < > 178 161   HDL 53 55   < > 60 62   LDL 78 90   < > 91 83   TRIG 82 145   < > 135 81   CHOLHDLRATIO  --   --   --  3.0 2.6    < > = values in this interval not displayed.        Lab Results   Component Value Date    AST 37 05/21/2022    AST 38 05/19/2021           3.  On Vyvanse for ADD for many years.  We decreased the dose slightly at the last visit from 70 to 50 mg once daily.  They state that they have not experienced any significant side effects of this medication.  Appetite has remained stable, no chest pains, no palpitations.  No effect on ability to exercise vigorously.   No insomnia or sleeping problems.    No significant mood changes.  States that this medication has helped their concentration.   They have been responsible with regard to RXs for this medication, no unusual or suspect behavior with this medication.    Wt Readings from Last 10 Encounters:   06/28/22 82.6 kg (182 lb)   12/06/21 85.5 kg (188 lb 6.4 oz)   05/19/21 83.9 kg (185 lb)   02/04/20 88.5 kg (195 lb)   08/26/19 86.2 kg (190 lb)   07/24/19 85.3 kg (188 lb)   05/13/19 84.4 kg (186 lb)   03/28/18 85.5 kg (188 lb 8 oz)   11/29/17 83.9 kg (185 lb)   11/13/17 83.9 kg (185 lb)        4.    Depression:  Has known history of depression.  Has been on medication for this.  The patient does not report any significant side effects of this medication.  The prior symptoms leading to the original diagnosis and decision to start medication therapy are better.     Appetite is stable.  Sleeping patterns are stable.    No reported thoughts of suicide or homicide.    PHQ 11/30/2021 12/6/2021 6/28/2022   PHQ-9 Total Score 5 3 1   Q9: Thoughts of better off dead/self-harm past 2 weeks Not at all Not at all Not at all      5.  Chronic insomnia, takes 75 mg doxepin nightly.  Denies side  "effects.\    6.  History of hypothyroidism.  On replacement therapy.  He has not experienced any significant side effects of this medication.   The patient denies of fatigue, weight changes, heat/cold intolerance, hair changes, nail changes, bowel changes.     Latest labs reviewed:    Lab Results   Component Value Date    TSH 3.92 05/21/2022    TSH 3.36 05/19/2021    TSH 3.55 12/21/2020    TSH 4.20 10/29/2020    TSH 4.44 08/26/2020    TSH 4.80 07/09/2020    TSH 5.17 05/22/2020    TSH 5.28 02/04/2020         7.  History of BPH symptoms, take dutasteride without reported side effects.    Current providers sharing in care for this patient include:   Patient Care Team:  Marcelo Moseley MD as PCP - General (Internal Medicine)  Marcelo Moseley MD as Assigned PCP    The following health maintenance items are reviewed in Epic and correct as of today:  Health Maintenance Due   Topic Date Due     AORTIC ANEURYSM SCREENING (SYSTEM ASSIGNED)  Never done     COLORECTAL CANCER SCREENING  03/02/2020       **I reviewed the information recorded in the patient's EPIC chart (including but not limited to medical history, surgical history, family history, problem list, medication list, and allergy list) and updated the information as indicated based on the patients reported information.             Review of Systems  Constitutional, HEENT, cardiovascular, pulmonary, gi and gu systems are negative, except as otherwise noted.    OBJECTIVE:   /64   Pulse 68   Temp 98.2  F (36.8  C) (Oral)   Ht 1.727 m (5' 8\")   Wt 82.6 kg (182 lb)   SpO2 99%   BMI 27.67 kg/m   Estimated body mass index is 27.67 kg/m  as calculated from the following:    Height as of this encounter: 1.727 m (5' 8\").    Weight as of this encounter: 82.6 kg (182 lb).  Physical Exam  GENERAL alert and no distress  EYES:  Normal sclera,conjunctiva, EOMI  HENT: oral and posterior pharynx without lesions or erythema, facies symmetric  NECK: Neck " supple. No LAD, without thyroidmegaly.  RESP: Clear to ausculation bilaterally without wheezes or crackles. Normal BS in all fields.  CV: RRR normal S1S2 without murmurs, rubs or gallops.  LYMPH: no cervical lymph adenopathy appreciated  MS: extremities- no gross deformities of the visible extremities noted,   EXT:  no lower extremity edema  PSYCH: Alert and oriented times 3; speech- coherent  SKIN:  No obvious significant skin lesions on visible portions of face     Diagnostic Test Results:  Labs reviewed in Epic    ASSESSMENT / PLAN:     (Z00.00) Encounter for Medicare annual wellness exam  (primary encounter diagnosis)  Comment: Discussed cardiac disease risk factors and cardiac disease risk factor modification, including diabetes screening, blood pressure screening (and management if indicated), and cholesterol screening.   Reviewed immunzation guidelines, including pneumococcal vaccines, annual influenza, and shingles vaccines.   Discussed routine cancer screenings, including skin cancer, colon cancer screening for everyone until age 80, prostate cancer screening in men until age 75, mammogram and PAP/pelvic for women until age 75.   Recommended regular dentist visits to care for remaining teeth.   Recommended regular screening for vision and glaucoma.   Recommended safe driving and accident avoidance.   Plan:     (F33.42) Major depressive disorder, recurrent episode, in full remission (H)  Comment: This condition is currently controlled on the current medical regimen.  Continue current therapy.   He has not experienced any significant side effects of this medication.   Plan: venlafaxine (EFFEXOR XR) 150 MG 24 hr capsule            (F90.0) Attention deficit hyperactivity disorder (ADHD), predominantly inattentive type  Comment: This condition is currently controlled on the current medical regimen.  Continue current therapy.   Reviewed the side effects associated with stimulant medications including but not  limited to hypertension, arrhythmia, insomnia, unintended weight loss and appetite decrease, etc.  Plan: lisdexamfetamine (VYVANSE) 50 MG capsule            (I10) Essential hypertension with goal blood pressure less than 140/90  Comment: This condition is currently controlled on the current medical regimen.  Continue current therapy.   Discussed current hypertension treatment guidelines, including indications for treatment and treatment options.  Discussed the importance for aggressive management of HTN to prevent vascular complications later.  Recommended lower fat, lower carbohydrate, and lower sodium (<2000 mg)diet.  Discussed required intervals for follow up on HTN, lab studies.  Recommened pt. occasionally follow their blood pressures outside the clinic to ensure that BPs are remaining within guidelines, and to contact me if the readings are not within guidelines on a regular basis so we can adjust treatment as needed.   Plan:     (E78.5) Hyperlipidemia LDL goal <100  Comment: This condition is currently controlled on the current medical regimen.  Continue current therapy.   Plan: atorvastatin (LIPITOR) 80 MG tablet            (N40.0) Benign prostatic hyperplasia without lower urinary tract symptoms  Comment: This condition is currently controlled on the current medical regimen.  Continue current therapy.   Plan: dutasteride (AVODART) 0.5 MG capsule            (F51.01) Primary insomnia  Comment: Has taken doxepin nightly for many years.  I asked him to consider reducing the dose slightly in an effort to minimize the amount of medicine required.  Reviewed side effects of this class of medicine, and the fact that risk of side effects does go up with advancing age.  Plan: doxepin (SINEQUAN) 25 MG capsule            (E03.8) Subclinical hypothyroidism  Comment: Labs stable, continue same dose  Plan:       (Z13.6) CARDIOVASCULAR SCREENING; LDL GOAL LESS THAN 130  Comment: Discussed cardiac disease risk factors and  "cardiac disease risk factor modification.   Plan: REVIEW OF HEALTH MAINTENANCE PROTOCOL ORDERS                  Patient has been advised of split billing requirements and indicates understanding: Yes    COUNSELING:  Reviewed preventive health counseling, as reflected in patient instructions       Regular exercise       Healthy diet/nutrition       Vision screening       Hearing screening       Dental care       Bladder control       Fall risk prevention       Immunizations    up to date              Colon cancer screening       Prostate cancer screening    Estimated body mass index is 27.67 kg/m  as calculated from the following:    Height as of this encounter: 1.727 m (5' 8\").    Weight as of this encounter: 82.6 kg (182 lb).        He reports that he has never smoked. He has never used smokeless tobacco.      Appropriate preventive services were discussed with this patient, including applicable screening as appropriate for cardiovascular disease, diabetes, osteopenia/osteoporosis, and glaucoma.  As appropriate for age/gender, discussed screening for colorectal cancer, prostate cancer, breast cancer, and cervical cancer. Checklist reviewing preventive services available has been given to the patient.    Reviewed patients plan of care and provided an AVS. The  priscilla Davidson meets the Care Plan requirement. This Care Plan has been established and reviewed with the .    Counseling Resources:  ATP IV Guidelines  Pooled Cohorts Equation Calculator  Breast Cancer Risk Calculator  Breast Cancer: Medication to Reduce Risk  FRAX Risk Assessment  ICSI Preventive Guidelines  Dietary Guidelines for Americans, 2010  [employer] MyPlate  ASA Prophylaxis  Lung CA Screening    Marcelo Moseley MD  [facility]    Identified Health Risks:  Answers for HPI/ROS submitted by the patient on 6/28/2022  If you checked off any problems, how difficult have these problems made it for you to do your work, take " care of things at home, or get along with other people?: Not difficult at all  PHQ9 TOTAL SCORE: 1

## 2022-06-28 NOTE — PATIENT INSTRUCTIONS
"    Keep the same dose of levothyroxine at 37.5 mcg once per day, but change the Levothyroxine dose to 1/2 of 75 mcg tablet once per day       Consider a slightly lower dose of Doxepin at bedtime if you can.  This will reduce the chance of side effects        Continue all medications at the same doses.  Contact your usual pharmacy if you need refills.        Return to see me in 6 months, sooner if needed.  Use [employer]oldiner or Call [phone] to schedule the appointment with me.       5 GOALS TO PREVENT VASCULAR DISEASE:     1.  Aggressive blood pressure control, under 130/80 ideally.  Using medications if needed.    Your blood pressure is under good control    BP Readings from Last 4 Encounters:   06/28/22 132/64   12/06/21 135/65   05/19/21 138/66   02/04/20 122/76       2.  Aggressive LDL cholesterol (\"bad cholesterol\") lowering as indicated.    Your goal is an LDL under 130 for sure, preferably under 100.  (If you have diabetes or previous vascular disease, the the LDL goals would be under 100 for sure, preferably under 70.)    New guidelines identify four high-risk groups who could benefit from statins:   *people with pre-existing heart disease, such as those who have had a heart attack;   *people ages 40 to 75 who have diabetes of any type  *patients ages 40 to 75 with at least a 7.5% risk of developing cardiovascular disease over the next decade, according to a formula described in the guidelines  *patients with the sort of super-high cholesterol that sometimes runs in families, as evidenced by an LDL of 190 milligrams per deciliter or higher    Your cholesterol levels are well controlled.    Recent Labs   Lab Test 05/21/22  1004 05/19/21  1619 06/06/16  0844 10/15/15  0840 08/07/14  1146   CHOL 147 174   < > 178 161   HDL 53 55   < > 60 62   LDL 78 90   < > 91 83   TRIG 82 145   < > 135 81   CHOLHDLRATIO  --   --   --  3.0 2.6    < > = values in this interval not displayed.       3.  Aggressive diabetic " prevention, screening and/or management.      You do not have diabetes as of the most recent blood tests.     4.  No smoking    5.  Consider daily preventative aspirin over age 50 if you have enough cardiac risk factors to place you at higher risk for the presence of vascular disease.    If you have any reason not to take aspirin such easy bruising or bleeding, stomach problems, other anticoagulant medications, or any other side effects, then you should not take Aspirin.     --Based on your current cardiac disease risk profile and/or age over 75, you do NOT need to take daily preventative aspirin.        Preventive Health Recommendations:   Male Ages 65 and over    Yearly exam:             See your health care provider every year in order to  o   Review health changes.   o   Discuss preventive care.    o   Review your medicines if your doctor has prescribed any.    Talk with your health care provider about whether you should have a test to screen for prostate cancer (PSA).    Every 3 years, have a diabetes test (fasting glucose). If you are at risk for diabetes, you should have this test more often.    Every 5 years, have a cholesterol test. Have this test more often if you are at risk for high cholesterol or heart disease.     Every 10 years, have a colonoscopy. Or, have a yearly FIT test (stool test). These exams will check for colon cancer.    Talk to with your health care provider about screening for Abdominal Aortic Aneurysm if you have a family history of AAA or have a history of smoking.    Shots:     Get a flu shot each year.     Get a tetanus shot every 10 years.     Talk to your doctor about your pneumonia vaccines. There are now two you should receive - Pneumovax (PPSV 23) and Prevnar (PCV 13).     Talk to your doctor about a shingles vaccine.     Talk to your doctor about the hepatitis B vaccine.  Nutrition:     Eat at least 5 servings of fruits and vegetables each day.     Eat whole-grain bread,  "whole-wheat pasta and brown rice instead of white grains and rice.     Talk to your provider about Calcium and Vitamin D.      --Good Grains:  Oats, brown rice, Quinoa (these do not raise the blood sugar as much)     --Bad grains:  Anything made from wheat or white rice     (because these raise the blood sugars significantly, and the possible gluten issue from wheat for some people).      --Proteins:  Aim for \"lean proteins\" including chicken, fish, seafood, pork, turkey, and eggs (in moderation); Eat red meat only occasionally    Lifestyle    Exercise for at least 150 minutes a week (30 minutes a day, 5 days a week). This will help you control your weight and prevent disease.     Limit alcohol to one drink per day.     No smoking.     Wear sunscreen to prevent skin cancer.     See your dentist every six months for an exam and cleaning.     See your eye doctor every 1 to 2 years to screen for conditions such as glaucoma, macular degeneration, cataracts, etc         Patient Education   Personalized Prevention Plan  You are due for the preventive services outlined below.  Your care team is available to assist you in scheduling these services.  If you have already completed any of these items, please share that information with your care team to update in your medical record.  Health Maintenance Due   Topic Date Due     ANNUAL REVIEW OF HM ORDERS  Never done     AORTIC ANEURYSM SCREENING (SYSTEM ASSIGNED)  Never done     Colorectal Cancer Screening  03/02/2020        "

## 2022-10-15 ENCOUNTER — HEALTH MAINTENANCE LETTER (OUTPATIENT)
Age: 72
End: 2022-10-15

## 2022-11-06 DIAGNOSIS — I10 ESSENTIAL HYPERTENSION WITH GOAL BLOOD PRESSURE LESS THAN 140/90: Chronic | ICD-10-CM

## 2022-11-07 RX ORDER — LISINOPRIL 10 MG/1
TABLET ORAL
Qty: 90 TABLET | Refills: 3 | Status: SHIPPED | OUTPATIENT
Start: 2022-11-07 | End: 2023-02-07

## 2022-11-28 DIAGNOSIS — F33.42 MAJOR DEPRESSIVE DISORDER, RECURRENT EPISODE, IN FULL REMISSION (H): ICD-10-CM

## 2022-11-29 RX ORDER — VENLAFAXINE HYDROCHLORIDE 150 MG/1
CAPSULE, EXTENDED RELEASE ORAL
Qty: 90 CAPSULE | Refills: 1 | Status: SHIPPED | OUTPATIENT
Start: 2022-11-29 | End: 2023-02-07 | Stop reason: DRUGHIGH

## 2022-12-07 ENCOUNTER — MYC REFILL (OUTPATIENT)
Dept: INTERNAL MEDICINE | Facility: CLINIC | Age: 72
End: 2022-12-07

## 2022-12-07 DIAGNOSIS — F90.0 ATTENTION DEFICIT HYPERACTIVITY DISORDER (ADHD), PREDOMINANTLY INATTENTIVE TYPE: ICD-10-CM

## 2022-12-08 RX ORDER — LISDEXAMFETAMINE DIMESYLATE 50 MG/1
50 CAPSULE ORAL EVERY MORNING
Qty: 90 CAPSULE | Refills: 0 | Status: SHIPPED | OUTPATIENT
Start: 2022-12-08 | End: 2022-12-23

## 2022-12-20 ENCOUNTER — MYC MEDICAL ADVICE (OUTPATIENT)
Dept: INTERNAL MEDICINE | Facility: CLINIC | Age: 72
End: 2022-12-20

## 2022-12-20 DIAGNOSIS — E03.8 SUBCLINICAL HYPOTHYROIDISM: ICD-10-CM

## 2022-12-20 DIAGNOSIS — Z13.6 CARDIOVASCULAR SCREENING; LDL GOAL LESS THAN 130: Primary | ICD-10-CM

## 2022-12-20 DIAGNOSIS — E78.5 HYPERLIPIDEMIA LDL GOAL <100: Chronic | ICD-10-CM

## 2022-12-20 DIAGNOSIS — I10 ESSENTIAL HYPERTENSION WITH GOAL BLOOD PRESSURE LESS THAN 140/90: Chronic | ICD-10-CM

## 2022-12-20 DIAGNOSIS — N40.0 BENIGN PROSTATIC HYPERPLASIA WITHOUT LOWER URINARY TRACT SYMPTOMS: ICD-10-CM

## 2022-12-20 DIAGNOSIS — K21.9 GASTROESOPHAGEAL REFLUX DISEASE WITHOUT ESOPHAGITIS: ICD-10-CM

## 2022-12-20 DIAGNOSIS — I10 HYPERTENSION GOAL BP (BLOOD PRESSURE) < 140/90: Chronic | ICD-10-CM

## 2022-12-20 DIAGNOSIS — E03.9 HYPOTHYROIDISM, UNSPECIFIED TYPE: ICD-10-CM

## 2022-12-20 DIAGNOSIS — Z12.5 SCREENING FOR PROSTATE CANCER: ICD-10-CM

## 2022-12-23 ENCOUNTER — MYC MEDICAL ADVICE (OUTPATIENT)
Dept: INTERNAL MEDICINE | Facility: CLINIC | Age: 72
End: 2022-12-23

## 2022-12-23 DIAGNOSIS — F90.0 ATTENTION DEFICIT HYPERACTIVITY DISORDER (ADHD), PREDOMINANTLY INATTENTIVE TYPE: ICD-10-CM

## 2022-12-23 RX ORDER — LISDEXAMFETAMINE DIMESYLATE 50 MG/1
50 CAPSULE ORAL EVERY MORNING
Qty: 90 CAPSULE | Refills: 0 | Status: SHIPPED | OUTPATIENT
Start: 2022-12-23 | End: 2023-01-02

## 2022-12-23 NOTE — TELEPHONE ENCOUNTER
On chart review, previous scripts for Vyvanse were being sent by same mail order company. Writer called pharmacy. Pharmacy states that Vyvanse had been covered under the patients medicare plan after PA had been approved. PA  on 22.     Called patient to inform him of PA being . Call not answered. LVM informing pt a goAct message sent to him.     Per pharmacy last script mailed out to patient was in 2022.     Routing to provider and PA team for review.     Jorge Lawson RN

## 2023-01-01 ENCOUNTER — MYC MEDICAL ADVICE (OUTPATIENT)
Dept: INTERNAL MEDICINE | Facility: CLINIC | Age: 73
End: 2023-01-01

## 2023-01-01 DIAGNOSIS — F51.01 PRIMARY INSOMNIA: ICD-10-CM

## 2023-01-01 DIAGNOSIS — F90.0 ATTENTION DEFICIT HYPERACTIVITY DISORDER (ADHD), PREDOMINANTLY INATTENTIVE TYPE: ICD-10-CM

## 2023-01-02 RX ORDER — DOXEPIN HYDROCHLORIDE 25 MG/1
75 CAPSULE ORAL AT BEDTIME
Qty: 270 CAPSULE | Refills: 0 | Status: SHIPPED | OUTPATIENT
Start: 2023-01-02 | End: 2023-02-07

## 2023-01-02 RX ORDER — LISDEXAMFETAMINE DIMESYLATE 50 MG/1
50 CAPSULE ORAL EVERY MORNING
Qty: 90 CAPSULE | Refills: 0 | Status: SHIPPED | OUTPATIENT
Start: 2023-01-02 | End: 2023-03-29

## 2023-01-02 NOTE — TELEPHONE ENCOUNTER
Please see mychart from patient and advise on appropriate course of action.     Nakia Khan RN    [facility] Triage Nurse

## 2023-01-13 NOTE — TELEPHONE ENCOUNTER
Atorvastatin:   Routing refill request to provider for review/approval because:  Labs not current:  LDL    Venlafaxine:  Routing refill request to provider for review/approval because:  Labs not current:  Cr  Patient needs to be seen because:  6 month visit due   BP not current  Due for updated PHQ-9  PHQ 6/5/2019 2/4/2020 7/27/2020   PHQ-9 Total Score 1 2 2   Q9: Thoughts of better off dead/self-harm past 2 weeks Not at all Not at all Not at all       Doxepin   Routing refill request to provider for review/approval because:  Patient needs to be seen because:  6 month visit due   BP not current  PHQ-9 not current     Lisinopril  Routing refill request to provider for review/approval because:  Labs not current:  Cr, K  BP not current            none

## 2023-02-03 ENCOUNTER — LAB (OUTPATIENT)
Dept: LAB | Facility: CLINIC | Age: 73
End: 2023-02-03
Payer: MEDICARE

## 2023-02-03 DIAGNOSIS — K21.9 GASTROESOPHAGEAL REFLUX DISEASE WITHOUT ESOPHAGITIS: ICD-10-CM

## 2023-02-03 DIAGNOSIS — E78.5 HYPERLIPIDEMIA LDL GOAL <100: Chronic | ICD-10-CM

## 2023-02-03 DIAGNOSIS — E03.9 HYPOTHYROIDISM, UNSPECIFIED TYPE: ICD-10-CM

## 2023-02-03 DIAGNOSIS — I10 HYPERTENSION GOAL BP (BLOOD PRESSURE) < 140/90: Chronic | ICD-10-CM

## 2023-02-03 DIAGNOSIS — Z13.6 CARDIOVASCULAR SCREENING; LDL GOAL LESS THAN 130: ICD-10-CM

## 2023-02-03 DIAGNOSIS — Z12.5 SCREENING FOR PROSTATE CANCER: ICD-10-CM

## 2023-02-03 DIAGNOSIS — E03.8 SUBCLINICAL HYPOTHYROIDISM: ICD-10-CM

## 2023-02-03 LAB
ALT SERPL W P-5'-P-CCNC: 55 U/L (ref 10–50)
ANION GAP SERPL CALCULATED.3IONS-SCNC: 10 MMOL/L (ref 7–15)
BUN SERPL-MCNC: 12.7 MG/DL (ref 8–23)
CALCIUM SERPL-MCNC: 9.6 MG/DL (ref 8.8–10.2)
CHLORIDE SERPL-SCNC: 98 MMOL/L (ref 98–107)
CHOLEST SERPL-MCNC: 179 MG/DL
CREAT SERPL-MCNC: 0.75 MG/DL (ref 0.67–1.17)
DEPRECATED HCO3 PLAS-SCNC: 26 MMOL/L (ref 22–29)
GFR SERPL CREATININE-BSD FRML MDRD: >90 ML/MIN/1.73M2
GLUCOSE SERPL-MCNC: 91 MG/DL (ref 70–99)
HDLC SERPL-MCNC: 57 MG/DL
HGB BLD-MCNC: 14.4 G/DL (ref 13.3–17.7)
LDLC SERPL CALC-MCNC: 93 MG/DL
NONHDLC SERPL-MCNC: 122 MG/DL
POTASSIUM SERPL-SCNC: 4.7 MMOL/L (ref 3.4–5.3)
PSA SERPL-MCNC: 4.33 NG/ML (ref 0–6.5)
SODIUM SERPL-SCNC: 134 MMOL/L (ref 136–145)
T4 FREE SERPL-MCNC: 1.09 NG/DL (ref 0.9–1.7)
TRIGL SERPL-MCNC: 145 MG/DL
TSH SERPL DL<=0.005 MIU/L-ACNC: 4.79 UIU/ML (ref 0.3–4.2)

## 2023-02-03 PROCEDURE — 85018 HEMOGLOBIN: CPT

## 2023-02-03 PROCEDURE — 80048 BASIC METABOLIC PNL TOTAL CA: CPT

## 2023-02-03 PROCEDURE — 80061 LIPID PANEL: CPT

## 2023-02-03 PROCEDURE — 84460 ALANINE AMINO (ALT) (SGPT): CPT

## 2023-02-03 PROCEDURE — 36415 COLL VENOUS BLD VENIPUNCTURE: CPT

## 2023-02-03 PROCEDURE — G0103 PSA SCREENING: HCPCS

## 2023-02-03 PROCEDURE — 84439 ASSAY OF FREE THYROXINE: CPT

## 2023-02-03 PROCEDURE — 84443 ASSAY THYROID STIM HORMONE: CPT

## 2023-02-06 ASSESSMENT — ANXIETY QUESTIONNAIRES
IF YOU CHECKED OFF ANY PROBLEMS ON THIS QUESTIONNAIRE, HOW DIFFICULT HAVE THESE PROBLEMS MADE IT FOR YOU TO DO YOUR WORK, TAKE CARE OF THINGS AT HOME, OR GET ALONG WITH OTHER PEOPLE: NOT DIFFICULT AT ALL
3. WORRYING TOO MUCH ABOUT DIFFERENT THINGS: NOT AT ALL
4. TROUBLE RELAXING: NOT AT ALL
7. FEELING AFRAID AS IF SOMETHING AWFUL MIGHT HAPPEN: NOT AT ALL
GAD7 TOTAL SCORE: 1
2. NOT BEING ABLE TO STOP OR CONTROL WORRYING: NOT AT ALL
GAD7 TOTAL SCORE: 1
8. IF YOU CHECKED OFF ANY PROBLEMS, HOW DIFFICULT HAVE THESE MADE IT FOR YOU TO DO YOUR WORK, TAKE CARE OF THINGS AT HOME, OR GET ALONG WITH OTHER PEOPLE?: NOT DIFFICULT AT ALL
5. BEING SO RESTLESS THAT IT IS HARD TO SIT STILL: NOT AT ALL
GAD7 TOTAL SCORE: 1
7. FEELING AFRAID AS IF SOMETHING AWFUL MIGHT HAPPEN: NOT AT ALL
1. FEELING NERVOUS, ANXIOUS, OR ON EDGE: SEVERAL DAYS
6. BECOMING EASILY ANNOYED OR IRRITABLE: NOT AT ALL

## 2023-02-06 ASSESSMENT — PATIENT HEALTH QUESTIONNAIRE - PHQ9
SUM OF ALL RESPONSES TO PHQ QUESTIONS 1-9: 3
SUM OF ALL RESPONSES TO PHQ QUESTIONS 1-9: 3
10. IF YOU CHECKED OFF ANY PROBLEMS, HOW DIFFICULT HAVE THESE PROBLEMS MADE IT FOR YOU TO DO YOUR WORK, TAKE CARE OF THINGS AT HOME, OR GET ALONG WITH OTHER PEOPLE: NOT DIFFICULT AT ALL

## 2023-02-07 ENCOUNTER — OFFICE VISIT (OUTPATIENT)
Dept: INTERNAL MEDICINE | Facility: CLINIC | Age: 73
End: 2023-02-07
Payer: MEDICARE

## 2023-02-07 VITALS
OXYGEN SATURATION: 99 % | DIASTOLIC BLOOD PRESSURE: 64 MMHG | HEIGHT: 68 IN | WEIGHT: 190.9 LBS | SYSTOLIC BLOOD PRESSURE: 130 MMHG | TEMPERATURE: 98.2 F | HEART RATE: 79 BPM | BODY MASS INDEX: 28.93 KG/M2

## 2023-02-07 DIAGNOSIS — F51.01 PRIMARY INSOMNIA: ICD-10-CM

## 2023-02-07 DIAGNOSIS — F33.42 MAJOR DEPRESSIVE DISORDER, RECURRENT EPISODE, IN FULL REMISSION (H): ICD-10-CM

## 2023-02-07 DIAGNOSIS — E03.9 HYPOTHYROIDISM, UNSPECIFIED TYPE: ICD-10-CM

## 2023-02-07 DIAGNOSIS — F90.0 ATTENTION DEFICIT HYPERACTIVITY DISORDER (ADHD), PREDOMINANTLY INATTENTIVE TYPE: ICD-10-CM

## 2023-02-07 DIAGNOSIS — N40.0 BENIGN PROSTATIC HYPERPLASIA WITHOUT LOWER URINARY TRACT SYMPTOMS: ICD-10-CM

## 2023-02-07 DIAGNOSIS — I10 ESSENTIAL HYPERTENSION WITH GOAL BLOOD PRESSURE LESS THAN 140/90: Primary | Chronic | ICD-10-CM

## 2023-02-07 DIAGNOSIS — E03.8 SUBCLINICAL HYPOTHYROIDISM: ICD-10-CM

## 2023-02-07 DIAGNOSIS — Z13.6 SCREENING FOR AAA (ABDOMINAL AORTIC ANEURYSM): ICD-10-CM

## 2023-02-07 DIAGNOSIS — E78.5 HYPERLIPIDEMIA LDL GOAL <100: Chronic | ICD-10-CM

## 2023-02-07 PROCEDURE — 96127 BRIEF EMOTIONAL/BEHAV ASSMT: CPT | Performed by: INTERNAL MEDICINE

## 2023-02-07 PROCEDURE — 99214 OFFICE O/P EST MOD 30 MIN: CPT | Performed by: INTERNAL MEDICINE

## 2023-02-07 RX ORDER — DUTASTERIDE 0.5 MG/1
1 CAPSULE, LIQUID FILLED ORAL DAILY
Qty: 90 CAPSULE | Refills: 3 | Status: SHIPPED | OUTPATIENT
Start: 2023-02-07 | End: 2024-02-22

## 2023-02-07 RX ORDER — LISINOPRIL 10 MG/1
10 TABLET ORAL DAILY
Qty: 90 TABLET | Refills: 3 | Status: SHIPPED | OUTPATIENT
Start: 2023-02-07 | End: 2024-02-23

## 2023-02-07 RX ORDER — VENLAFAXINE HYDROCHLORIDE 150 MG/1
150 CAPSULE, EXTENDED RELEASE ORAL DAILY
Qty: 90 CAPSULE | Refills: 1 | Status: CANCELLED | OUTPATIENT
Start: 2023-02-07

## 2023-02-07 RX ORDER — LEVOTHYROXINE SODIUM 50 UG/1
50 TABLET ORAL DAILY
Qty: 90 TABLET | Refills: 1 | Status: SHIPPED | OUTPATIENT
Start: 2023-02-07 | End: 2023-11-30

## 2023-02-07 RX ORDER — ATORVASTATIN CALCIUM 80 MG/1
80 TABLET, FILM COATED ORAL DAILY
Qty: 90 TABLET | Refills: 3 | Status: SHIPPED | OUTPATIENT
Start: 2023-02-07 | End: 2024-02-26

## 2023-02-07 RX ORDER — VENLAFAXINE HYDROCHLORIDE 75 MG/1
225 CAPSULE, EXTENDED RELEASE ORAL DAILY
Qty: 270 CAPSULE | Refills: 1 | Status: SHIPPED | OUTPATIENT
Start: 2023-02-07 | End: 2023-08-18

## 2023-02-07 RX ORDER — DOXEPIN HYDROCHLORIDE 25 MG/1
75 CAPSULE ORAL AT BEDTIME
Qty: 270 CAPSULE | Refills: 1 | Status: SHIPPED | OUTPATIENT
Start: 2023-02-07 | End: 2023-09-18

## 2023-02-07 ASSESSMENT — PATIENT HEALTH QUESTIONNAIRE - PHQ9
10. IF YOU CHECKED OFF ANY PROBLEMS, HOW DIFFICULT HAVE THESE PROBLEMS MADE IT FOR YOU TO DO YOUR WORK, TAKE CARE OF THINGS AT HOME, OR GET ALONG WITH OTHER PEOPLE: NOT DIFFICULT AT ALL
SUM OF ALL RESPONSES TO PHQ QUESTIONS 1-9: 3

## 2023-02-07 ASSESSMENT — ANXIETY QUESTIONNAIRES: GAD7 TOTAL SCORE: 1

## 2023-02-07 NOTE — LETTER
[facility]  02/07/23  Patient: Stuart Moran  YOB: 1950  Medical Record Number: [id number]                                                                                  Non-Opioid Controlled Substance Agreement    This is an agreement between you and your provider regarding safe and appropriate use of controlled substances prescribed by your care team. Controlled substances are?medicines that can cause physical and mental dependence (abuse).     There are strict laws about having and using these medicines. We here at [facility] are  committed to working with you in your efforts to get better. To support you in this work, we'll help you schedule regular office appointments for medicine refills. If we must cancel or change your appointment for any reason, we'll make sure you have enough medicine to last until your next appointment.     As a Provider, I will:     Listen carefully to your concerns while treating you with respect.     Recommend a treatment plan that I believe is in your best interest and may involve therapies other than medicine.      Talk with you often about the possible benefits and the risk of harm of any medicine that we prescribe for you.    Assess the safety of this medicine and check how well it works.      Provide a plan on how to taper (discontinue or go off) using this medicine if the decision is made to stop its use.      ::  As a Patient, I understand controlled substances:       Are prescribed by my care provider to help me function or work and manage my condition(s).?    Are strong medicines and can cause serious side effects.       Need to be taken exactly as prescribed.?Combining controlled substances with certain medicines or chemicals (such as illegal drugs, alcohol, sedatives, sleeping pills, and benzodiazepines) can be dangerous or even fatal.? If I stop taking my medicines suddenly, I may have severe withdrawal symptoms.     The risks,  benefits, and side effects of these medicine(s) were explained to me. I agree that:    1. I will take part in other treatments as advised by my care team. This may be psychiatry or counseling, physical therapy, behavioral therapy, group treatment or a referral to specialist.    2. I will keep all my appointments and understand this is part of the monitoring of controlled substances.?My care team may require an office visit for EVERY controlled substance refill. If I miss appointments or don t follow instructions, my care team may stop my medicine    3. I will take my medicines as prescribed. I will not change the dose or schedule unless my care team tells me to. There will be no refills if I run out early.      4. I may be asked to come to the clinic and complete a urine drug test or complete a pill count. If I don t give a urine sample or participate in a pill count, the care team may stop my medicine.    5. I will only receive controlled substance prescriptions from this clinic. If I am treated by another provider, I will tell them that I am taking controlled substances and that I have a treatment agreement with this provider. I will inform my [facility] care team within one business day if I am given a prescription for any controlled substance by another healthcare provider. My [facility] care team can contact other providers and pharmacists about my use of any medicines.    6. It is up to me to make sure that I don't run out of my medicines on weekends or holidays.?If my care team is willing to refill my prescription without a visit, I must request refills only during office hours. Refills may take up to 3 business days to process. I will use one pharmacy to fill all my controlled substance prescriptions. I will notify the clinic about any changes to my insurance or medicine availability.    7. I am responsible for my prescriptions. If the medicine/prescription is lost, stolen or destroyed, it will  not be replaced.?I also agree not to share controlled substance medicines with anyone.     8. I am aware I should not use any illegal or recreational drugs. I agree not to drink alcohol unless my care team says I can.     9. If I enroll in the [place] Medical Cannabis program, I will tell my care team before my next refill.    10. I will tell my care team right away if I become pregnant, have a new medical problem treated outside of my regular clinic, or have a change in my medicines.     11. I understand that this medicine can affect my thinking, judgment and reaction time.? Alcohol and drugs affect the brain and body, which can affect the safety of my driving. Being under the influence of alcohol or drugs can affect my decision-making, behaviors, personal safety and the safety of others. Driving while impaired (DWI) can occur if a person is driving, operating or in physical control of a car, motorcycle, boat, snowmobile, ATV, motorbike, off-road vehicle or any other motor vehicle ([place] Statute 169A.20). I understand the risk if I choose to drive or operate any vehicle or machinery.    I understand that if I do not follow any of the conditions above, my prescriptions or treatment may be stopped or changed.   I agree that my provider, clinic care team and pharmacy may work with any city, state or federal law enforcement agency that investigates the misuse, sale or other diversion of my controlled medicine. I will allow my provider to discuss my care with, or share a copy of, this agreement with any other treating provider, pharmacy or emergency room where I receive care.     I have read this agreement and have asked questions about anything I did not understand.    ________________________________________________________  Patient Signature - Stuart Moran     ___________________                   Date     ________________________________________________________  Provider Signature - Marcelo Moseley MD        ___________________                   Date     ________________________________________________________  Witness Signature (required if provider not present while patient signing)          ___________________                   Date

## 2023-02-07 NOTE — PATIENT INSTRUCTIONS
" Change the thyroid supplement:     --Increase Levothyroxine to 50 mcg once per day     Reduce the Venlafaxine to 225 mg once per day  (3 x 75 mg tablets).  This 225 mg is the maximum recommended dose for this medication for your age.      Continue all other medications at the same doses.  Contact your usual pharmacy if you need refills.      One time screening ultrasound  for abdominal aortic aneurysm as recommended by Medicare.  You will eb contacted to arrange this procedure.      Return to see me in approximately 6 months, sooner if needed.  Please get nonfasting labs done in the [facility] or at any other [facility] lab 1-2 days before this appointment.  If you get the labs done at another clinic, make arrangements with that clinic directly.  The orders will be in place.  Use [employer] or Call [phone] to schedule the appointment with me and lab appointment.     5 GOALS TO PREVENT VASCULAR DISEASE:     1.  Aggressive blood pressure control, under 130/80 ideally.  Using medications if needed.    Your blood pressure is under good control    BP Readings from Last 4 Encounters:   02/07/23 130/64   06/28/22 132/64   12/06/21 135/65   05/19/21 138/66       2.  Aggressive LDL cholesterol (\"bad cholesterol\") lowering as indicated.    Your goal is an LDL under 130 for sure, preferably under 100.  (If you have diabetes or previous vascular disease, the the LDL goals would be under 100 for sure, preferably under 70.)    New guidelines identify four high-risk groups who could benefit from statins:   *people with pre-existing heart disease, such as those who have had a heart attack;   *people ages 40 to 75 who have diabetes of any type  *patients ages 40 to 75 with at least a 7.5% risk of developing cardiovascular disease over the next decade, according to a formula described in the guidelines  *patients with the sort of super-high cholesterol that sometimes runs in families, as evidenced by an LDL of 190 " milligrams per deciliter or higher    Your cholesterol levels are well controlled.    Recent Labs   Lab Test 02/03/23  1341 05/21/22  1004 06/06/16  0844 10/15/15  0840   CHOL 179 147   < > 178   HDL 57 53   < > 60   LDL 93 78   < > 91   TRIG 145 82   < > 135   CHOLHDLRATIO  --   --   --  3.0    < > = values in this interval not displayed.       3.  Aggressive diabetic prevention, screening and/or management.      You do not have diabetes as of the most recent blood tests.     4.  No smoking    5.  Consider daily preventative aspirin over age 50 if you have enough cardiac risk factors to place you at higher risk for the presence of vascular disease.    If you have any reason not to take aspirin such easy bruising or bleeding, stomach problems, other anticoagulant medications, or any other side effects, then you should not take Aspirin.     --Based on your current cardiac disease risk profile and/or age over 75, you do NOT need to take daily preventative aspirin.

## 2023-02-07 NOTE — PROGRESS NOTES
Assessment & Plan     (I10) Essential hypertension with goal blood pressure less than 140/90  (primary encounter diagnosis)  Comment: This condition is currently controlled on the current medical regimen.  Continue current therapy.   Plan: lisinopril (ZESTRIL) 10 MG tablet, REVIEW OF         HEALTH MAINTENANCE PROTOCOL ORDERS            (F33.42) Major depressive disorder, recurrent episode, in full remission (H)  Comment: curretn 300 mg dose is higher than the recommedned amnount, reduce to the max recommedned dose of 225 mg daily.   Plan: venlafaxine (EFFEXOR XR) 75 MG 24 hr capsule,         REVIEW OF HEALTH MAINTENANCE PROTOCOL ORDERS            (F90.0) Attention deficit hyperactivity disorder (ADHD), predominantly inattentive type  Comment: This condition is currently controlled on the current medical regimen.  Continue current therapy.   50 mg dose working well, continue same dose.   Controlled substance agreement signed.   Plan:     (F51.01) Primary insomnia  Comment: This condition is currently controlled on the current medical regimen.  Continue current therapy.   Plan: doxepin (SINEQUAN) 25 MG capsule, REVIEW OF         HEALTH MAINTENANCE PROTOCOL ORDERS            (N40.0) Benign prostatic hyperplasia without lower urinary tract symptoms  Comment: This condition is currently controlled on the current medical regimen.  Continue current therapy.   Plan: dutasteride (AVODART) 0.5 MG capsule, REVIEW OF        HEALTH MAINTENANCE PROTOCOL ORDERS            (E78.5) Hyperlipidemia LDL goal <100  Comment: This condition is currently controlled on the current medical regimen.  Continue current therapy.   He has not experienced any significant side effects of this medication.   Plan: atorvastatin (LIPITOR) 80 MG tablet, REVIEW OF         HEALTH MAINTENANCE PROTOCOL ORDERS            (E03.8) Subclinical hypothyroidism  Comment: TSH slightly elevated, increase to 50 mcg daily.   Recheck in 6 months   Plan: REVIEW OF  "HEALTH MAINTENANCE PROTOCOL ORDERS,         levothyroxine (SYNTHROID/LEVOTHROID) 50 MCG         tablet, TSH with free T4 reflex            (E03.9) Hypothyroidism, unspecified type  Comment:   Plan: REVIEW OF HEALTH MAINTENANCE PROTOCOL ORDERS,         levothyroxine (SYNTHROID/LEVOTHROID) 50 MCG         tablet, TSH with free T4 reflex            (Z13.6) Screening for AAA (abdominal aortic aneurysm)  Comment: one time recommended screening.   Plan: [place] Medicare AAA Screening                        BMI:   Estimated body mass index is 29.03 kg/m  as calculated from the following:    Height as of this encounter: 1.727 m (5' 8\").    Weight as of this encounter: 86.6 kg (190 lb 14.4 oz).           Return in about 6 months (around 8/7/2023) for Medicare Annual Wellness Exam, with pre-visit non-fasting labs.    Marcelo Moseley MD  [facility] HospitalYOUSIF Davidson is a 73 year old, presenting for the following health issues:   Follow Up, Hypertension, and Thyroid Disease      History of Present Illness       Mental Health Follow-up:  Patient presents to follow-up on Depression & Anxiety.Patient's depression since last visit has been:  Good  The patient is having other symptoms associated with depression.  Patient's anxiety since last visit has been:  Good  The patient is having other symptoms associated with anxiety.  Any significant life events: No  Patient is not feeling anxious or having panic attacks.  Patient has no concerns about alcohol or drug use.    Hypertension: He presents for follow up of hypertension.  He does not check blood pressure  regularly outside of the clinic. Outside blood pressures have been over 140/90. He does not follow a low salt diet.     Hypothyroidism:     Since last visit, patient describes the following symptoms::  Anxiety, Depression and Fatigue     Today's PHQ-9         PHQ-9 Total Score: 3    PHQ-9 Q9 Thoughts of better off dead/self-harm past " "2 weeks :   Not at all    How difficult have these problems made it for you to do your work, take care of things at home, or get along with other people: Not difficult at all  Today's NATHALIA-7 Score: 1     On Vyvanse for ADD.    Doing well on the 50 mg dose.   They state that they have not experienced any significant side effects of this medication.  Appetite has remained stable, no chest pains, no palpitations.  No effect on ability to exercise vigorously.   No insomnia or sleeping problems.    No significant mood changes.  States that this medication has helped their concentration.   They have been responsible with regard to RXs for this medication, no unusual or suspect behavior with this medication.    Wt Readings from Last 10 Encounters:   02/07/23 86.6 kg (190 lb 14.4 oz)   06/28/22 82.6 kg (182 lb)   12/06/21 85.5 kg (188 lb 6.4 oz)   05/19/21 83.9 kg (185 lb)   02/04/20 88.5 kg (195 lb)   08/26/19 86.2 kg (190 lb)   07/24/19 85.3 kg (188 lb)   05/13/19 84.4 kg (186 lb)   03/28/18 85.5 kg (188 lb 8 oz)   11/29/17 83.9 kg (185 lb)        **I reviewed the information recorded in the patient's EPIC chart (including but not limited to medical history, surgical history, family history, problem list, medication list, and allergy list) and updated the information as indicated based on the patients reported information.           Review of Systems   Constitutional, HEENT, cardiovascular, pulmonary, gi and gu systems are negative, except as otherwise noted.      Objective    /64   Pulse 79   Temp 98.2  F (36.8  C) (Oral)   Ht 1.727 m (5' 8\")   Wt 86.6 kg (190 lb 14.4 oz)   SpO2 99%   BMI 29.03 kg/m    Body mass index is 29.03 kg/m .  Physical Exam   GENERAL alert and no distress  EYES:  Normal sclera,conjunctiva, EOMI  HENT: oral and posterior pharynx without lesions or erythema, facies symmetric  NECK: Neck supple. No LAD, without thyroidmegaly.  RESP: Clear to ausculation bilaterally without wheezes or " crackles. Normal BS in all fields.  CV: RRR normal S1S2 without murmurs, rubs or gallops.  LYMPH: no cervical lymph adenopathy appreciated  MS: extremities- no gross deformities of the visible extremities noted,   EXT:  no lower extremity edema  PSYCH: Alert and oriented times 3; speech- coherent  SKIN:  No obvious significant skin lesions on visible portions of face

## 2023-02-12 ENCOUNTER — NURSE TRIAGE (OUTPATIENT)
Dept: NURSING | Facility: CLINIC | Age: 73
End: 2023-02-12
Payer: MEDICARE

## 2023-02-12 ENCOUNTER — MYC MEDICAL ADVICE (OUTPATIENT)
Dept: INTERNAL MEDICINE | Facility: CLINIC | Age: 73
End: 2023-02-12
Payer: MEDICARE

## 2023-02-12 ENCOUNTER — TELEPHONE (OUTPATIENT)
Dept: NURSING | Facility: CLINIC | Age: 73
End: 2023-02-12
Payer: MEDICARE

## 2023-02-12 NOTE — TELEPHONE ENCOUNTER
COVID Positive/Requesting COVID treatment    Patient is positive for COVID and requesting treatment options.    Date of positive COVID test (PCR or at home)? 2/12/2023  Current COVID symptoms: fever or chills, cough, headache, sore throat and congestion or runny nose  Date COVID symptoms began: 2/8/2023    Message should be routed to clinic RN pool. Best phone number to use for call back: [phone]      Patient had a cough with thick mucous. Patient treated with mucinex and that has improved. Patient continues to have a cough, headache, chills, some body aches. No fever.   Denies chest pain shortness of breath.  Patient is established with last office visit on 6/28/2022.  Patient is on day 4.  Routing to nurse pool for call back.   Ivon Silva RN   02/12/23 11:42 AM  [facility] Nurse Advisor    Reason for Disposition    [1] HIGH RISK for severe COVID complications (e.g., weak immune system, age > 64 years, obesity with BMI of 30 or higher, pregnant, chronic lung disease or other chronic medical condition) AND [2] COVID symptoms (e.g., cough, fever)  (Exceptions: Already seen by PCP and no new or worsening symptoms.)    Additional Information    Negative: SEVERE difficulty breathing (e.g., struggling for each breath, speaks in single words)    Negative: Difficult to awaken or acting confused (e.g., disoriented, slurred speech)    Negative: Bluish (or gray) lips or face now    Negative: Shock suspected (e.g., cold/pale/clammy skin, too weak to stand, low BP, rapid pulse)    Negative: Sounds like a life-threatening emergency to the triager    Negative: [1] Diagnosed or suspected COVID-19 AND [2] symptoms lasting 3 or more weeks    Negative: [1] COVID-19 exposure AND [2] no symptoms    Negative: COVID-19 vaccine reaction suspected (e.g., fever, headache, muscle aches) occurring 1 to 3 days after getting vaccine    Negative: COVID-19 vaccine, questions about    Negative: [1] Lives with someone known to have  influenza (flu test positive) AND [2] flu-like symptoms (e.g., cough, runny nose, sore throat, SOB; with or without fever)    Negative: [1] Adult with possible COVID-19 symptoms AND [2] triager concerned about severity of symptoms or other causes    Negative: COVID-19 and breastfeeding, questions about    Negative: SEVERE or constant chest pain or pressure  (Exception: Mild central chest pain, present only when coughing.)    Negative: MODERATE difficulty breathing (e.g., speaks in phrases, SOB even at rest, pulse 100-120)    Negative: Headache and stiff neck (can't touch chin to chest)    Negative: Oxygen level (e.g., pulse oximetry) 90 percent or lower    Negative: Chest pain or pressure  (Exception: MILD central chest pain, present only when coughing)    Negative: Patient sounds very sick or weak to the triager    Negative: MILD difficulty breathing (e.g., minimal/no SOB at rest, SOB with walking, pulse <100)    Negative: Fever > 103 F (39.4 C)    Negative: [1] Fever > 101 F (38.3 C) AND [2] over 60 years of age    Negative: [1] Fever > 100.0 F (37.8 C) AND [2] bedridden (e.g., nursing home patient, CVA, chronic illness, recovering from surgery)    Protocols used: CORONAVIRUS (COVID-19) DIAGNOSED OR [id number]

## 2023-02-12 NOTE — TELEPHONE ENCOUNTER
Pt calling with concerns about;    States he tested positive today and is wondering how long it would be until someone calls him to prescribe Paxlovis    Advised that patients are triaged according to onset of symptoms.    A nurse should be calling him within a day.    Pt requested that a msg be routed to his PCP as well.    Zaria Vallecillo RN, Nurse Advisor 5:15 PM 2/12/2023

## 2023-02-13 ENCOUNTER — VIRTUAL VISIT (OUTPATIENT)
Dept: INTERNAL MEDICINE | Facility: CLINIC | Age: 73
End: 2023-02-13
Payer: MEDICARE

## 2023-02-13 DIAGNOSIS — U07.1 INFECTION DUE TO 2019 NOVEL CORONAVIRUS: Primary | ICD-10-CM

## 2023-02-13 PROCEDURE — 99213 OFFICE O/P EST LOW 20 MIN: CPT | Mod: 95 | Performed by: INTERNAL MEDICINE

## 2023-02-13 NOTE — TELEPHONE ENCOUNTER
RN COVID TREATMENT VISIT  02/13/23    Stuart Moran  73 year old  Current weight? 190 lbs    Has the patient been seen by a primary care provider at an [facility] or [facility] within the past two years? Yes.   Have you been in close proximity to/do you have a known exposure to a person with a confirmed case of influenza? No.     Date of positive COVID test (PCR or at home)?  2/12/23    Current COVID symptoms: fever or chills, cough, fatigue, muscle or body aches, headache, sore throat and congestion or runny nose    Date COVID symptoms began: 2/10/23 per patient    Do you have any of the following conditions that place you at risk of being very sick from COVID-19? 65 years or older, mental health conditions and overweight (BMI>25)    Is patient eligible to continue? Yes, established patient, 12 years or older weighing at least 88.2 lbs, who has COVID symptoms that started in the past 5 days and is at risk for being very sick from COVID-19.       Have you received monoclonal antibodies or oral antiviral medications since testing positive to COVID-19? No    Are you currently hospitalized for COVID-19? No    Do you have a history of hepatitis? No    Are you currently pregnant or nursing? No    Do you have a clinically significant hypersensitivity to nirmatrelvir, ritonavir, or molnupiravir? No    Do you have any history of severe renal impairment (eGFR < 30mL/min)? No    Do you have any history of hepatic impairment or abnormalities (e.g. hepatic panel, ALT, AST, ALK Phos, bilirubin)? YES    Have you had a coronary stent placed in the previous 6 months? No    Lab Results   Component Value Date    ALT 55 02/03/2023    ALT 62 05/19/2021           Is patient eligible to continue? No, patient does not meet all eligibility requirements for the RN COVID treatment (as denoted by yes response(s) above). Patient informed they will need a virtual provider visit to assess treatment options.       Patient agrees and is scheduled VV today with an available provider.     Faby Wheeler RN

## 2023-02-13 NOTE — TELEPHONE ENCOUNTER
See Nurse Triage encounter, pt was called and has an OV.     Appointments in Next Year    Feb 13, 2023 11:30 AM  (Arrive by 11:20 AM)  Covid Treatment Visit with Daniel Us MD  [facility] ([facility] ) [phone]

## 2023-02-13 NOTE — PROGRESS NOTES
"Stuart is a 73 year old who is being evaluated via a billable video visit.       Stuart Moran was Unable to connect via the video visit and therefore are converting the telephone visit only    How would you like to obtain your AVS? MyChart  If the video visit is dropped, the invitation should be resent by: Text to cell phone: [phone]  Will anyone else be joining your video visit? No      Assessment & Plan     Infection due to 2019 novel coronavirus  Discussed with patient he is interested in treatment options.  They were discussed in full.  - nirmatrelvir and ritonavir (PAXLOVID) therapy pack; Take 3 tablets by mouth 2 times daily for 5 days (Take 2 Nirmatrelvir tablets and 1 Ritonavir tablet twice daily for 5 days)    Patient was advised about the dosing as well as side effect profile. We discussed low-dose drug interactions based on current medications.  We also advised that he should not be taking his Lipitor during his Paxlovid use or for 4 to 5 days upon completion.  Advise also the patient consider stopping any over-the-counter products in the short-term while on therapy for reassurance.    Patient was advised that if any of his symptoms worsen despite treatment that he should be seen in the emergency room       BMI:   Estimated body mass index is 29.03 kg/m  as calculated from the following:    Height as of 2/7/23: 1.727 m (5' 8\").    Weight as of 2/7/23: 86.6 kg (190 lb 14.4 oz).       See Patient Instructions    Return in about 2 weeks (around 2/27/2023) for if symptoms recur or worsen.    Daniel Us MD  [facility]        Subjective   Stuart is a 73 year old, presenting for the following health issues:  Covid Concern      Patient has not seen me prior and is followed primarily by Dr. Moseley.    You are feeling better today than he did yesterday and the headache that he has had has resolved    HPI       COVID-19 Symptom Review  How many days ago did these symptoms " start? 2-3 days     Are any of the following symptoms significant for you?  New or worsening difficulty breathing? Yes    Please describe what kind of difficulty you are having breathing:Moderate dyspnea (short of breath with ADLs, shortness of breath that limits the ability to walk up one flight of stairs without needing to rest)    Worsening cough? Yes, I am coughing up mucus.    Fever or chills? Yes, the highest temperature was 101    Headache: YES    Sore throat: YES    Chest pain: YES    Diarrhea: No    Body aches? YES    What treatments has patient tried? Guaifenesin (mucinex), Advil    Does patient live in a nursing home, group home, or shelter? No  Does patient have a way to get food/medications during quarantined? Yes, I have a friend or family member who can help me.            Current Outpatient Medications   Medication     atorvastatin (LIPITOR) 80 MG tablet     clobetasol (TEMOVATE) 0.05 % external cream     doxepin (SINEQUAN) 25 MG capsule     dutasteride (AVODART) 0.5 MG capsule     Glucosamine-Chondroitin 250-200 MG TABS     GuaiFENesin (MUCINEX PO)     hypromellose (GENTEAL) 0.3 % SOLN ophthalmic solution     levothyroxine (SYNTHROID/LEVOTHROID) 50 MCG tablet     lisdexamfetamine (VYVANSE) 50 MG capsule     lisinopril (ZESTRIL) 10 MG tablet     Loratadine (CLARITIN PO)     magnesium 250 MG tablet     Multiple Vitamins-Minerals (CENTRUM SILVER) per tablet     NAPROXEN PO     Polyethyl Glycol-Propyl Glycol (SYSTANE OP)     venlafaxine (EFFEXOR XR) 75 MG 24 hr capsule     VITAMIN D, CHOLECALCIFEROL, PO     VITAMIN E NATURAL PO     zinc 50 MG TABS     No current facility-administered medications for this visit.         Review of Systems   EYES: NEGATIVE for vision changes or irritation  ENT/MOUTH: NEGATIVE for ear, mouth and throat problems  GI: NEGATIVE for nausea, abdominal pain, heartburn, or change in bowel habits  : NEGATIVE for frequency, dysuria, or hematuria  ENDOCRINE: NEGATIVE for  temperature intolerance, skin/hair changes  HEME: NEGATIVE for bleeding problems  PSYCHIATRIC: NEGATIVE for changes in mood or affect      Objective         Vitals:  No vitals were obtained today due to virtual visit.    Physical Exam   GENERAL: alert and no distress.  RESP: No audible wheeze, cough, or visible cyanosis.  No increased work of breathing.    NEURO:  Mentation and speech appropriate for age.  PSYCH: Mentation appears normal, affect normal/bright, judgement and insight intact, normal speech and appearance well-groomed.    Creatinine   Date Value Ref Range Status   02/03/2023 0.75 0.67 - 1.17 mg/dL Final   05/19/2021 0.88 0.66 - 1.25 mg/dL Final             Telephone visit:   Start time 1056AM  End time:  1115AM    Originating Location (pt. Location): Home  Distant Location (provider location):  On-site

## 2023-02-13 NOTE — TELEPHONE ENCOUNTER
See 2/12/23 Triage encounter.     Future Appointments 2/13/2023 - 8/12/2023      Date Visit Type Length Department Provider     2/13/2023 11:30 AM COVID TREATMENT VIRTUAL VISIT 30 min  INTERNAL MEDICINE Daniel Us MD    Location Instructions:     [employer] is in the [facility] at [address] [facility] in [place]. This just east of the [address] exit off of [address]. Free parking is available; access the lot from [address] [place] [address] or [facility].

## 2023-02-13 NOTE — TELEPHONE ENCOUNTER
Contact him per protocol.  He would qualify for Paxlovid therapy, follow the nursing protocol..   If he does not fit the protocol, then have him do a virtual visit tomorrow.     (I am on vacation all this week)

## 2023-03-29 ENCOUNTER — MYC REFILL (OUTPATIENT)
Dept: INTERNAL MEDICINE | Facility: CLINIC | Age: 73
End: 2023-03-29
Payer: MEDICARE

## 2023-03-29 DIAGNOSIS — F90.0 ATTENTION DEFICIT HYPERACTIVITY DISORDER (ADHD), PREDOMINANTLY INATTENTIVE TYPE: ICD-10-CM

## 2023-03-30 RX ORDER — LISDEXAMFETAMINE DIMESYLATE 50 MG/1
50 CAPSULE ORAL EVERY MORNING
Qty: 90 CAPSULE | Refills: 0 | Status: SHIPPED | OUTPATIENT
Start: 2023-03-30 | End: 2023-06-29

## 2023-05-30 ENCOUNTER — PATIENT OUTREACH (OUTPATIENT)
Dept: CARE COORDINATION | Facility: CLINIC | Age: 73
End: 2023-05-30
Payer: MEDICARE

## 2023-06-13 ENCOUNTER — PATIENT OUTREACH (OUTPATIENT)
Dept: CARE COORDINATION | Facility: CLINIC | Age: 73
End: 2023-06-13
Payer: MEDICARE

## 2023-06-29 ENCOUNTER — ANCILLARY PROCEDURE (OUTPATIENT)
Dept: ULTRASOUND IMAGING | Facility: CLINIC | Age: 73
End: 2023-06-29
Attending: INTERNAL MEDICINE
Payer: MEDICARE

## 2023-06-29 ENCOUNTER — MYC REFILL (OUTPATIENT)
Dept: INTERNAL MEDICINE | Facility: CLINIC | Age: 73
End: 2023-06-29

## 2023-06-29 DIAGNOSIS — Z13.6 SCREENING FOR AAA (ABDOMINAL AORTIC ANEURYSM): ICD-10-CM

## 2023-06-29 DIAGNOSIS — F90.0 ATTENTION DEFICIT HYPERACTIVITY DISORDER (ADHD), PREDOMINANTLY INATTENTIVE TYPE: ICD-10-CM

## 2023-06-29 PROCEDURE — 76706 US ABDL AORTA SCREEN AAA: CPT | Performed by: RADIOLOGY

## 2023-06-29 RX ORDER — LISDEXAMFETAMINE DIMESYLATE 50 MG/1
50 CAPSULE ORAL EVERY MORNING
Qty: 90 CAPSULE | Refills: 0 | Status: SHIPPED | OUTPATIENT
Start: 2023-06-29 | End: 2023-09-27

## 2023-08-16 ENCOUNTER — LAB (OUTPATIENT)
Dept: LAB | Facility: CLINIC | Age: 73
End: 2023-08-16
Payer: MEDICARE

## 2023-08-16 DIAGNOSIS — E03.8 SUBCLINICAL HYPOTHYROIDISM: ICD-10-CM

## 2023-08-16 DIAGNOSIS — E03.9 HYPOTHYROIDISM, UNSPECIFIED TYPE: ICD-10-CM

## 2023-08-16 LAB — TSH SERPL DL<=0.005 MIU/L-ACNC: 1.79 UIU/ML (ref 0.3–4.2)

## 2023-08-16 PROCEDURE — 36415 COLL VENOUS BLD VENIPUNCTURE: CPT

## 2023-08-16 PROCEDURE — 84443 ASSAY THYROID STIM HORMONE: CPT

## 2023-08-17 ASSESSMENT — ENCOUNTER SYMPTOMS
SHORTNESS OF BREATH: 0
CONSTIPATION: 0
CHILLS: 0
HEMATOCHEZIA: 0
SORE THROAT: 0
DYSURIA: 0
MYALGIAS: 1
ABDOMINAL PAIN: 0
NERVOUS/ANXIOUS: 0
JOINT SWELLING: 0
FEVER: 0
PALPITATIONS: 0
EYE PAIN: 0
NAUSEA: 0
HEMATURIA: 0
HEARTBURN: 0
DIZZINESS: 0
WEAKNESS: 1
HEADACHES: 0
PARESTHESIAS: 0
DIARRHEA: 0
COUGH: 0
FREQUENCY: 0

## 2023-08-17 ASSESSMENT — PATIENT HEALTH QUESTIONNAIRE - PHQ9
SUM OF ALL RESPONSES TO PHQ QUESTIONS 1-9: 1
SUM OF ALL RESPONSES TO PHQ QUESTIONS 1-9: 1
10. IF YOU CHECKED OFF ANY PROBLEMS, HOW DIFFICULT HAVE THESE PROBLEMS MADE IT FOR YOU TO DO YOUR WORK, TAKE CARE OF THINGS AT HOME, OR GET ALONG WITH OTHER PEOPLE: SOMEWHAT DIFFICULT

## 2023-08-17 ASSESSMENT — ACTIVITIES OF DAILY LIVING (ADL): CURRENT_FUNCTION: NO ASSISTANCE NEEDED

## 2023-08-18 ENCOUNTER — OFFICE VISIT (OUTPATIENT)
Dept: INTERNAL MEDICINE | Facility: CLINIC | Age: 73
End: 2023-08-18
Payer: MEDICARE

## 2023-08-18 VITALS
DIASTOLIC BLOOD PRESSURE: 66 MMHG | SYSTOLIC BLOOD PRESSURE: 132 MMHG | BODY MASS INDEX: 27.17 KG/M2 | HEART RATE: 69 BPM | TEMPERATURE: 98 F | WEIGHT: 179.3 LBS | HEIGHT: 68 IN | OXYGEN SATURATION: 97 %

## 2023-08-18 DIAGNOSIS — F51.01 PRIMARY INSOMNIA: ICD-10-CM

## 2023-08-18 DIAGNOSIS — E03.8 SUBCLINICAL HYPOTHYROIDISM: ICD-10-CM

## 2023-08-18 DIAGNOSIS — N40.0 BENIGN PROSTATIC HYPERPLASIA WITHOUT LOWER URINARY TRACT SYMPTOMS: ICD-10-CM

## 2023-08-18 DIAGNOSIS — E78.5 HYPERLIPIDEMIA LDL GOAL <100: Chronic | ICD-10-CM

## 2023-08-18 DIAGNOSIS — F33.42 MAJOR DEPRESSIVE DISORDER, RECURRENT EPISODE, IN FULL REMISSION (H): ICD-10-CM

## 2023-08-18 DIAGNOSIS — I10 ESSENTIAL HYPERTENSION WITH GOAL BLOOD PRESSURE LESS THAN 140/90: Chronic | ICD-10-CM

## 2023-08-18 DIAGNOSIS — E03.9 HYPOTHYROIDISM, UNSPECIFIED TYPE: ICD-10-CM

## 2023-08-18 DIAGNOSIS — Z00.00 ENCOUNTER FOR MEDICARE ANNUAL WELLNESS EXAM: Primary | ICD-10-CM

## 2023-08-18 DIAGNOSIS — F90.0 ATTENTION DEFICIT HYPERACTIVITY DISORDER (ADHD), PREDOMINANTLY INATTENTIVE TYPE: ICD-10-CM

## 2023-08-18 PROCEDURE — 99214 OFFICE O/P EST MOD 30 MIN: CPT | Mod: 25 | Performed by: INTERNAL MEDICINE

## 2023-08-18 PROCEDURE — G0439 PPPS, SUBSEQ VISIT: HCPCS | Performed by: INTERNAL MEDICINE

## 2023-08-18 RX ORDER — VENLAFAXINE HYDROCHLORIDE 75 MG/1
150 CAPSULE, EXTENDED RELEASE ORAL DAILY
Qty: 180 CAPSULE | Refills: 1 | Status: SHIPPED | OUTPATIENT
Start: 2023-08-18 | End: 2024-02-26

## 2023-08-18 ASSESSMENT — ENCOUNTER SYMPTOMS
FREQUENCY: 0
MYALGIAS: 1
PARESTHESIAS: 0
NERVOUS/ANXIOUS: 0
PALPITATIONS: 0
ABDOMINAL PAIN: 0
DIZZINESS: 0
FEVER: 0
NAUSEA: 0
WEAKNESS: 1
COUGH: 0
SORE THROAT: 0
HEADACHES: 0
HEMATOCHEZIA: 0
DYSURIA: 0
CHILLS: 0
HEARTBURN: 0
JOINT SWELLING: 0
HEMATURIA: 0
CONSTIPATION: 0
EYE PAIN: 0
DIARRHEA: 0
SHORTNESS OF BREATH: 0

## 2023-08-18 ASSESSMENT — ACTIVITIES OF DAILY LIVING (ADL): CURRENT_FUNCTION: NO ASSISTANCE NEEDED

## 2023-08-18 ASSESSMENT — PAIN SCALES - GENERAL: PAINLEVEL: NO PAIN (0)

## 2023-08-18 NOTE — PROGRESS NOTES
"The patient was provided with written information regarding signs of hearing loss.    Answers submitted by the patient for this visit:  Patient Health Questionnaire (Submitted on 8/17/2023)  If you checked off any problems, how difficult have these problems made it for you to do your work, take care of things at home, or get along with other people?: Somewhat difficult  PHQ9 TOTAL SCORE: 1  Annual Preventive Visit (Submitted on 8/17/2023)  Chief Complaint: Annual Exam:  In general, how would you rate your overall physical health?: good  Frequency of exercise:: 4-5 days/week  Do you usually eat at least 4 servings of fruit and vegetables a day, include whole grains & fiber, and avoid regularly eating high fat or \"junk\" foods? : Yes  Taking medications regularly:: Yes  Medication side effects:: No muscle aches, No significant flushing  Activities of Daily Living: no assistance needed  Home safety: no safety concerns identified  Hearing Impairment:: difficulty following a conversation in a noisy restaurant or crowded room  In the past 6 months, have you been bothered by leaking of urine?: No  abdominal pain: No  Blood in stool: No  Blood in urine: No  chest pain: No  chills: No  congestion: No  constipation: No  cough: No  diarrhea: No  dizziness: No  ear pain: No  eye pain: No  nervous/anxious: No  fever: No  frequency: No  genital sores: No  headaches: No  hearing loss: Yes  heartburn: No  joint swelling: No  peripheral edema: No  mood changes: No  myalgias: Yes  nausea: No  dysuria: No  palpitations: No  Skin sensation changes: No  sore throat: No  urgency: No  rash: No  shortness of breath: No  visual disturbance: No  weakness: Yes  impotence: No  penile discharge: No  In general, how would you rate your overall mental or emotional health?: good  Additional concerns today:: Yes  Exercise outside of work (Submitted on 8/17/2023)  Chief Complaint: Annual Exam:  Duration of exercise:: 45-60 minutes    "

## 2023-08-18 NOTE — PATIENT INSTRUCTIONS
"     Continue all medications at the same doses.  Contact your usual pharmacy if you need refills.     Covid vaccines are now recommended annually.  Get the most updated Covid vaccine when it becomes available, consider getting this at the same time as the annual influenza vaccine.     Return to see me in 6 months, sooner if needed.  Please get fasting labs done at the [facility] or any other [facility] lab 1-2 days before this appointment (schedule a \"lab appointment\").  If you get the labs done at another clinic, make arrangements with them directly.  The orders will be in place.  Eat nothing for at least 8 hours prior to having these labs drawn.  Use [employer] or Call [phone] to schedule the appointment with me and lab appointment.                    5 GOALS TO PREVENT VASCULAR DISEASE:     1.  Aggressive blood pressure control, under 130/80 ideally.  Using medications if needed.    Your blood pressure is under good control    BP Readings from Last 4 Encounters:   08/18/23 132/66   02/07/23 130/64   06/28/22 132/64   12/06/21 135/65       2.  Aggressive LDL cholesterol (\"bad cholesterol\") lowering as indicated.    Your goal is an LDL under 130 for sure, preferably under 100.  (If you have diabetes or previous vascular disease, the the LDL goals would be under 100 for sure, preferably under 70.)    New guidelines identify four high-risk groups who could benefit from statins:   *people with pre-existing heart disease, such as those who have had a heart attack;   *people ages 40 to 75 who have diabetes of any type  *patients ages 40 to 75 with at least a 7.5% risk of developing cardiovascular disease over the next decade, according to a formula described in the guidelines  *patients with the sort of super-high cholesterol that sometimes runs in families, as evidenced by an LDL of 190 milligrams per deciliter or higher    Your cholesterol levels are well controlled.    Recent Labs   Lab Test " "02/03/23  1341 05/21/22  1004 06/06/16  0844 10/15/15  0840   CHOL 179 147   < > 178   HDL 57 53   < > 60   LDL 93 78   < > 91   TRIG 145 82   < > 135   CHOLHDLRATIO  --   --   --  3.0    < > = values in this interval not displayed.       3.  Aggressive diabetic prevention, screening and/or management.      You do not have diabetes as of the most recent blood tests.     4.  No smoking    5.  Consider daily preventative aspirin over age 50 if you have enough cardiac risk factors to place you at higher risk for the presence of vascular disease.    If you have any reason not to take aspirin such easy bruising or bleeding, stomach problems, other anticoagulant medications, or any other side effects, then you should not take Aspirin.     --Based on your current cardiac disease risk profile and/or age over 75, you do NOT need to take daily preventative aspirin.           --Good Grains:  Oats, brown rice, Quinoa (these do not raise the blood sugar as much)     --Bad grains:  Anything made from wheat or white rice     (because these raise the blood sugars significantly, and the possible gluten issue from wheat for some people).      --Proteins:  Aim for \"lean proteins\" including chicken, fish, seafood, pork, turkey, and eggs (in moderation); Eat red meat only occasionally        Naga Álvarez:                                             Patient Education   Personalized Prevention Plan  You are due for the preventive services outlined below.  Your care team is available to assist you in scheduling these services.  If you have already completed any of these items, please share that information with your care team to update in your medical record.  Health Maintenance Due   Topic Date Due     Diptheria Tetanus Pertussis (DTAP/TDAP/TD) Vaccine (1 - Tdap) 07/26/2017     COVID-19 Vaccine (5 - Additional dose for Iker series) 02/19/2023     Complete Blood Count  05/21/2023     Annual Wellness Visit  06/28/2023     Hearing Loss: " Care Instructions  Overview     Hearing loss is a sudden or slow decrease in how well you hear. It can range from slight to profound. Permanent hearing loss can occur with aging. It also can happen when you are exposed long-term to loud noise. Examples include listening to loud music, riding motorcycles, or being around other loud machines.  Hearing loss can affect your work and home life. It can make you feel lonely or depressed. You may feel that you have lost your independence. But hearing aids and other devices can help you hear better and feel connected to others.  Follow-up care is a key part of your treatment and safety. Be sure to make and go to all appointments, and call your doctor if you are having problems. It's also a good idea to know your test results and keep a list of the medicines you take.  How can you care for yourself at home?  Avoid loud noises whenever possible. This helps keep your hearing from getting worse.  Always wear hearing protection around loud noises.  Wear a hearing aid as directed.  A professional can help you pick a hearing aid that will work best for you.  You can also get hearing aids over the counter for mild to moderate hearing loss.  Have hearing tests as your doctor suggests. They can show whether your hearing has changed. Your hearing aid may need to be adjusted.  Use other devices as needed. These may include:  Telephone amplifiers and hearing aids that can connect to a television, stereo, radio, or microphone.  Devices that use lights or vibrations. These alert you to the doorbell, a ringing telephone, or a baby monitor.  Television closed-captioning. This shows the words at the bottom of the screen. Most new TVs can do this.  TTY (text telephone). This lets you type messages back and forth on the telephone instead of talking or listening. These devices are also called TDD. When messages are typed on the keyboard, they are sent over the phone line to a receiving TTY. The  "message is shown on a monitor.  Use text messaging, social media, and email if it is hard for you to communicate by telephone.  Try to learn a listening technique called speechreading. It is not lipreading. You pay attention to people's gestures, expressions, posture, and tone of voice. These clues can help you understand what a person is saying. Face the person you are talking to, and have them face you. Make sure the lighting is good. You need to see the other person's face clearly.  Think about counseling if you need help to adjust to your hearing loss.  When should you call for help?  Watch closely for changes in your health, and be sure to contact your doctor if:    You think your hearing is getting worse.     You have new symptoms, such as dizziness or nausea.   Where can you learn more?  Go to https://www.[employer].net/patiented  Enter R798 in the search box to learn more about \"Hearing Loss: Care Instructions.\"  Current as of: March 1, 2023               Content Version: 13.7    0873-0643 [employer].   Care instructions adapted under license by your healthcare professional. If you have questions about a medical condition or this instruction, always ask your healthcare professional. [employer] disclaims any warranty or liability for your use of this information.         "

## 2023-08-18 NOTE — PROGRESS NOTES
"SUBJECTIVE:   Stuart is a 73 year old who presents for Preventive Visit.      8/18/2023     9:33 AM   Additional Questions   Roomed by Alanis SHERWOOD CMA     Are you in the first 12 months of your Medicare coverage?  No    Healthy Habits:     In general, how would you rate your overall health?  Good    Frequency of exercise:  4-5 days/week    Duration of exercise:  45-60 minutes    Do you usually eat at least 4 servings of fruit and vegetables a day, include whole grains    & fiber and avoid regularly eating high fat or \"junk\" foods?  Yes    Taking medications regularly:  Yes    Barriers to taking medications:  None    Medication side effects:  No muscle aches and No significant flushing    Ability to successfully perform activities of daily living:  No assistance needed    Home Safety:  No safety concerns identified    Hearing Impairment:  Difficulty following a conversation in a noisy restaurant or crowded room    In the past 6 months, have you been bothered by leaking of urine?  No    In general, how would you rate your overall mental or emotional health?  Good    Additional concerns today:  Yes    (1.) More frequent and more strenuous exercise.  (2.) Lab panel results.  (3.) Aortic ultrasound results.  (4.) Due for innoculations.  (5.) Maintenance meds.     Have you ever done Advance Care Planning? (For example, a Health Directive, POLST, or a discussion with a medical provider or your loved ones about your wishes): Yes, advance care planning is on file.    Fall risk  Fallen 2 or more times in the past year?: No  Any fall with injury in the past year?: No    Cognitive Screening   1) Repeat 3 items (Leader, Season, Table)    2) Clock draw: NORMAL  3) 3 item recall: Recalls 3 objects  Results: 3 items recalled: COGNITIVE IMPAIRMENT LESS LIKELY    Mini-CogTM Copyright S [employer]. Licensed by the author for use in [facility]; reprinted with permission (main@.edu). All rights reserved.      Do you have sleep " apnea, excessive snoring or daytime drowsiness? : no    Reviewed and updated as needed this visit by clinical staff   Tobacco  Allergies  Meds  Problems             Reviewed and updated as needed this visit by Provider    Allergies  Meds  Problems            Social History     Tobacco Use    Smoking status: Never    Smokeless tobacco: Never   Substance Use Topics    Alcohol use: No         8/17/2023     8:37 PM   Alcohol Use   Prescreen: >3 drinks/day or >7 drinks/week? Not Applicable     Do you have a current opioid prescription? No  Do you use any other controlled substances or medications that are not prescribed by a provider? None    1 On Vyvanse for ADD.    They state that they have not experienced any significant side effects of this medication.  Appetite has remained stable, no chest pains, no palpitations.  No effect on ability to exercise vigorously.   No insomnia or sleeping problems.    No significant mood changes.  States that this medication has helped their concentration.   They have been responsible with regard to RXs for this medication, no unusual or suspect behavior with this medication.    Wt Readings from Last 10 Encounters:   08/18/23 81.3 kg (179 lb 4.8 oz)   02/07/23 86.6 kg (190 lb 14.4 oz)   06/28/22 82.6 kg (182 lb)   12/06/21 85.5 kg (188 lb 6.4 oz)   05/19/21 83.9 kg (185 lb)   02/04/20 88.5 kg (195 lb)   08/26/19 86.2 kg (190 lb)   07/24/19 85.3 kg (188 lb)   05/13/19 84.4 kg (186 lb)   03/28/18 85.5 kg (188 lb 8 oz)        2.  Hypertension:  History of hypertension, on medication.  No reported side effects from medications.    Reviewed last 6 BP readings in chart:  BP Readings from Last 6 Encounters:   08/18/23 132/66   02/07/23 130/64   06/28/22 132/64   12/06/21 135/65   05/19/21 138/66   02/04/20 122/76     No active cardiac complaints or symptoms with exercise.     3.  Hyperlipidemia:  Has history of hyperlipidemia.    The patient is taking a medication for this.  Denies any  significant side effects from his medication.      Latest labs reviewed:    Recent Labs   Lab Test 02/03/23  1341 05/21/22  1004 06/06/16  0844 10/15/15  0840   CHOL 179 147   < > 178   HDL 57 53   < > 60   LDL 93 78   < > 91   TRIG 145 82   < > 135   CHOLHDLRATIO  --   --   --  3.0    < > = values in this interval not displayed.        Lab Results   Component Value Date    AST 37 05/21/2022    AST 38 05/19/2021           4.  History of hypothyroidism.  On replacement therapy.  He has not experienced any significant side effects of this medication.   The patient denies of fatigue, weight changes, heat/cold intolerance, hair changes, nail changes, bowel changes.     Latest labs reviewed:    Lab Results   Component Value Date    TSH 1.79 08/16/2023    TSH 4.79 02/03/2023    TSH 3.92 05/21/2022    TSH 3.36 05/19/2021    TSH 3.55 12/21/2020    TSH 4.20 10/29/2020    TSH 4.44 08/26/2020    TSH 4.80 07/09/2020    TSH 5.17 05/22/2020    TSH 5.28 02/04/2020           5.  History of previous depression, has been on venlafaxine for years.  He recently decreased the dose from 225 to 150 mg daily with no change in his symptoms.  He reports no side effects on this medicine.  Current providers sharing in care for this patient include:   Patient Care Team:  Marcelo Moseley MD as PCP - General (Internal Medicine)  Marcelo Moseley MD as Assigned PCP    The following health maintenance items are reviewed in Epic and correct as of today:  Health Maintenance   Topic Date Due    DTAP/TDAP/TD IMMUNIZATION (1 - Tdap) 07/26/2017    COVID-19 Vaccine (5 - Additional dose for Iker series) 02/19/2023    CBC  05/21/2023    INFLUENZA VACCINE (1) 09/01/2023    ALT  02/03/2024    BMP  02/03/2024    LIPID  02/03/2024    PSA  02/03/2024    NATHALIA ASSESSMENT  02/07/2024    PHQ-9  02/18/2024    TSH W/FREE T4 REFLEX  08/16/2024    MEDICARE ANNUAL WELLNESS VISIT  08/18/2024    ANNUAL REVIEW OF HM ORDERS  08/18/2024    FALL RISK  "ASSESSMENT  08/18/2024    ADVANCE CARE PLANNING  08/18/2028    COLORECTAL CANCER SCREENING  10/01/2031    HEPATITIS C SCREENING  Completed    DEPRESSION ACTION PLAN  Completed    Pneumococcal Vaccine: 65+ Years  Completed    ZOSTER IMMUNIZATION  Completed    AORTIC ANEURYSM SCREENING (SYSTEM ASSIGNED)  Completed    IPV IMMUNIZATION  Aged Out    MENINGITIS IMMUNIZATION  Aged Out       **I reviewed the information recorded in the patient's EPIC chart (including but not limited to medical history, surgical history, family history, problem list, medication list, and allergy list) and updated the information as indicated based on the patients reported information.           Review of Systems   Constitutional:  Negative for chills and fever.   HENT:  Positive for hearing loss. Negative for congestion, ear pain and sore throat.    Eyes:  Negative for pain and visual disturbance.   Respiratory:  Negative for cough and shortness of breath.    Cardiovascular:  Negative for chest pain, palpitations and peripheral edema.   Gastrointestinal:  Negative for abdominal pain, constipation, diarrhea, heartburn, hematochezia and nausea.   Genitourinary:  Negative for dysuria, frequency, genital sores, hematuria, impotence, penile discharge and urgency.   Musculoskeletal:  Positive for myalgias. Negative for joint swelling.   Skin:  Negative for rash.   Neurological:  Positive for weakness. Negative for dizziness, headaches and paresthesias.   Psychiatric/Behavioral:  Negative for mood changes. The patient is not nervous/anxious.      Constitutional, HEENT, cardiovascular, pulmonary, gi and gu systems are negative, except as otherwise noted.    OBJECTIVE:   /66   Pulse 69   Temp 98  F (36.7  C) (Oral)   Ht 1.727 m (5' 8\")   Wt 81.3 kg (179 lb 4.8 oz)   SpO2 97%   BMI 27.26 kg/m   Estimated body mass index is 27.26 kg/m  as calculated from the following:    Height as of this encounter: 1.727 m (5' 8\").    Weight as of this " encounter: 81.3 kg (179 lb 4.8 oz).  Physical Exam  GENERAL alert and no distress  EYES:  Normal sclera,conjunctiva, EOMI  HENT: oral and posterior pharynx without lesions or erythema, facies symmetric  NECK: Neck supple. No LAD, without thyroidmegaly.  RESP: Clear to ausculation bilaterally without wheezes or crackles. Normal BS in all fields.  CV: RRR normal S1S2 without murmurs, rubs or gallops.  LYMPH: no cervical lymph adenopathy appreciated  MS: extremities- no gross deformities of the visible extremities noted,   EXT:  no lower extremity edema  PSYCH: Alert and oriented times 3; speech- coherent  SKIN:  No obvious significant skin lesions on visible portions of face     Diagnostic Test Results:  Labs reviewed in Epic    ASSESSMENT / PLAN:     (Z00.00) Encounter for Medicare annual wellness exam  (primary encounter diagnosis)  Comment: Discussed cardiac disease risk factors and cardiac disease risk factor modification, including diabetes screening, blood pressure screening (and management if indicated), and cholesterol screening.   Reviewed immunzation guidelines, including pneumococcal vaccines, annual influenza, and shingles vaccines.   Discussed routine cancer screenings, including skin cancer, colon cancer screening for everyone until age 80, prostate cancer screening in men until age 75, mammogram and PAP/pelvic for women until age 75.   Recommended regular dentist visits to care for remaining teeth.   Recommended regular screening for vision and glaucoma.   Recommended safe driving and accident avoidance.   Plan:     (F33.42) Major depressive disorder, recurrent episode, in full remission (H)  Comment: This condition is currently controlled on the current medical regimen.  Continue current therapy.  Doing well on the lower dose, continue the same 150 mg daily dose.  Plan: PRIMARY CARE FOLLOW-UP SCHEDULING, venlafaxine         (EFFEXOR XR) 75 MG 24 hr capsule, REVIEW OF         HEALTH MAINTENANCE PROTOCOL  ORDERS            (F90.0) Attention deficit hyperactivity disorder (ADHD), predominantly inattentive type  Comment: This condition is currently controlled on the current medical regimen.  Continue current therapy.   He has not experienced any significant side effects of this medication.   Consider dose reduction at the next visit, he may not require such a large dose of this medication if he does not require supreme concentration.  Plan: PRIMARY CARE FOLLOW-UP SCHEDULING, REVIEW OF         HEALTH MAINTENANCE PROTOCOL ORDERS            (I10) Essential hypertension with goal blood pressure less than 140/90  Comment: This condition is currently controlled on the current medical regimen.  Continue current therapy.   Plan: PRIMARY CARE FOLLOW-UP SCHEDULING, REVIEW OF         HEALTH MAINTENANCE PROTOCOL ORDERS            (F51.01) Primary insomnia  Comment:   Plan: PRIMARY CARE FOLLOW-UP SCHEDULING, REVIEW OF         HEALTH MAINTENANCE PROTOCOL ORDERS            (E78.5) Hyperlipidemia LDL goal <100  Comment: This condition is currently controlled on the current medical regimen.  Continue current therapy.   Plan: PRIMARY CARE FOLLOW-UP SCHEDULING, REVIEW OF         HEALTH MAINTENANCE PROTOCOL ORDERS            (N40.0) Benign prostatic hyperplasia without lower urinary tract symptoms  Comment:   Plan: PRIMARY CARE FOLLOW-UP SCHEDULING, REVIEW OF         HEALTH MAINTENANCE PROTOCOL ORDERS            (E03.8) Subclinical hypothyroidism  Comment: This condition is currently controlled on the current medical regimen.  Continue current therapy.  Thyroid labs are normal  Plan: PRIMARY CARE FOLLOW-UP SCHEDULING, REVIEW OF         HEALTH MAINTENANCE PROTOCOL ORDERS            (E03.9) Hypothyroidism, unspecified type  Comment:   Plan: PRIMARY CARE FOLLOW-UP SCHEDULING, REVIEW OF         HEALTH MAINTENANCE PROTOCOL ORDERS               Patient has been advised of split billing requirements and indicates understanding:  "Yes      COUNSELING:  Reviewed preventive health counseling, as reflected in patient instructions       Regular exercise       Healthy diet/nutrition       Vision screening       Hearing screening       Dental care       Fall risk prevention       Immunizations  up to date  Covid vaccines are now recommended annually.  Get the most updated Covid vaccine when it becomes available, consider getting this at the same time as the annual influenza vaccine.            Colon cancer screening       Prostate cancer screening      BMI:   Estimated body mass index is 27.26 kg/m  as calculated from the following:    Height as of this encounter: 1.727 m (5' 8\").    Weight as of this encounter: 81.3 kg (179 lb 4.8 oz).         He reports that he has never smoked. He has never used smokeless tobacco.      Appropriate preventive services were discussed with this patient, including applicable screening as appropriate for cardiovascular disease, diabetes, osteopenia/osteoporosis, and glaucoma.  As appropriate for age/gender, discussed screening for colorectal cancer, prostate cancer, breast cancer, and cervical cancer. Checklist reviewing preventive services available has been given to the patient.    Reviewed patients plan of care and provided an AVS. The  for Stuart meets the Care Plan requirement. This Care Plan has been established and reviewed with the .          Marcelo Moseley MD  [facility]    Identified Health Risks:    "

## 2023-09-18 DIAGNOSIS — F51.01 PRIMARY INSOMNIA: ICD-10-CM

## 2023-09-18 RX ORDER — DOXEPIN HYDROCHLORIDE 25 MG/1
CAPSULE ORAL
Qty: 270 CAPSULE | Refills: 2 | Status: SHIPPED | OUTPATIENT
Start: 2023-09-18 | End: 2024-02-26

## 2023-09-27 ENCOUNTER — MYC REFILL (OUTPATIENT)
Dept: INTERNAL MEDICINE | Facility: CLINIC | Age: 73
End: 2023-09-27
Payer: MEDICARE

## 2023-09-27 DIAGNOSIS — F90.0 ATTENTION DEFICIT HYPERACTIVITY DISORDER (ADHD), PREDOMINANTLY INATTENTIVE TYPE: ICD-10-CM

## 2023-10-01 RX ORDER — LISDEXAMFETAMINE DIMESYLATE 50 MG/1
50 CAPSULE ORAL EVERY MORNING
Qty: 90 CAPSULE | Refills: 0 | Status: SHIPPED | OUTPATIENT
Start: 2023-10-01 | End: 2024-01-26

## 2023-11-29 DIAGNOSIS — E03.9 HYPOTHYROIDISM, UNSPECIFIED TYPE: ICD-10-CM

## 2023-11-29 DIAGNOSIS — E03.8 SUBCLINICAL HYPOTHYROIDISM: ICD-10-CM

## 2023-11-30 RX ORDER — LEVOTHYROXINE SODIUM 50 UG/1
50 TABLET ORAL DAILY
Qty: 90 TABLET | Refills: 1 | Status: SHIPPED | OUTPATIENT
Start: 2023-11-30 | End: 2024-02-26

## 2023-12-27 NOTE — ADDENDUM NOTE
Addended by: SAYRA MARIA on: 5/13/2019 11:25 AM     Modules accepted: Orders     Detail Level: Detailed Add 69079 Cpt? (Important Note: In 2017 The Use Of 60667 Is Being Tracked By Cms To Determine Future Global Period Reimbursement For Global Periods): yes

## 2024-01-09 ENCOUNTER — MYC MEDICAL ADVICE (OUTPATIENT)
Dept: INTERNAL MEDICINE | Facility: CLINIC | Age: 74
End: 2024-01-09
Payer: MEDICARE

## 2024-01-09 DIAGNOSIS — E03.8 SUBCLINICAL HYPOTHYROIDISM: ICD-10-CM

## 2024-01-09 DIAGNOSIS — N40.0 BENIGN PROSTATIC HYPERPLASIA WITHOUT LOWER URINARY TRACT SYMPTOMS: ICD-10-CM

## 2024-01-09 DIAGNOSIS — F33.42 DEPRESSION, MAJOR, RECURRENT, IN COMPLETE REMISSION (H): Chronic | ICD-10-CM

## 2024-01-09 DIAGNOSIS — E78.5 HYPERLIPIDEMIA LDL GOAL <100: Chronic | ICD-10-CM

## 2024-01-09 DIAGNOSIS — E03.9 HYPOTHYROIDISM, UNSPECIFIED TYPE: ICD-10-CM

## 2024-01-09 DIAGNOSIS — Z13.6 CARDIOVASCULAR SCREENING; LDL GOAL LESS THAN 100: ICD-10-CM

## 2024-01-09 DIAGNOSIS — Z12.5 SCREENING FOR PROSTATE CANCER: Primary | ICD-10-CM

## 2024-01-09 DIAGNOSIS — I10 ESSENTIAL HYPERTENSION WITH GOAL BLOOD PRESSURE LESS THAN 140/90: Chronic | ICD-10-CM

## 2024-01-09 DIAGNOSIS — I10 HYPERTENSION GOAL BP (BLOOD PRESSURE) < 140/90: Chronic | ICD-10-CM

## 2024-01-10 NOTE — TELEPHONE ENCOUNTER
PCP, please place lab orders, and we will help pt schedule lab apt before visit on 2/26  Marge ADAMS

## 2024-01-11 ENCOUNTER — TELEPHONE (OUTPATIENT)
Dept: INTERNAL MEDICINE | Facility: CLINIC | Age: 74
End: 2024-01-11
Payer: MEDICARE

## 2024-01-11 DIAGNOSIS — F90.0 ATTENTION DEFICIT HYPERACTIVITY DISORDER (ADHD), PREDOMINANTLY INATTENTIVE TYPE: Primary | ICD-10-CM

## 2024-01-17 NOTE — TELEPHONE ENCOUNTER
PA Initiation    Medication: LISDEXAMFETAMINE DIMESYLATE 50 MG PO CAPS  Insurance Company: [employer] - Phone [phone] Fax [phone]  Pharmacy Filling the Rx: [facility] - [address]  Filling Pharmacy Phone: [phone]  Filling Pharmacy Fax:    Start Date: 1/17/2024  Retail Pharmacy Prior Authorization Team   Phone: [phone]

## 2024-01-18 NOTE — TELEPHONE ENCOUNTER
PRIOR AUTHORIZATION DENIED    Medication: LISDEXAMFETAMINE DIMESYLATE 50 MG PO CAPS  Insurance Company: [employer] - Phone [phone] Fax [phone]  Denial Date: 1/18/2024  Denial Reason(s):     Appeal Information:     Patient Notified: Pharmacy will notify patient.

## 2024-01-24 ENCOUNTER — TELEPHONE (OUTPATIENT)
Dept: INTERNAL MEDICINE | Facility: CLINIC | Age: 74
End: 2024-01-24
Payer: MEDICARE

## 2024-01-24 ENCOUNTER — MYC MEDICAL ADVICE (OUTPATIENT)
Dept: INTERNAL MEDICINE | Facility: CLINIC | Age: 74
End: 2024-01-24
Payer: MEDICARE

## 2024-01-24 DIAGNOSIS — F90.0 ATTENTION DEFICIT HYPERACTIVITY DISORDER (ADHD), PREDOMINANTLY INATTENTIVE TYPE: ICD-10-CM

## 2024-01-24 NOTE — TELEPHONE ENCOUNTER
"His ADHD goes back 20+ years, including many years prior to starting with [facility] in about 2009.    I called the patient and spoke with him about the medications he has tried prior to starting Vyvanse in 2012.     He tried Adderall both short and long-acting approximately 4854-7680, and he was changed to Vyvanse because Adderall produced more of a \"yo-yo\" affect causing acute fatigue and drowsiness once the effect wore off.  He has tried Ritalin/methylphenidate (both long and short acting) over 20 years ago by his former psychiatrist and remembers a similar  \"yo-yo\" effect along with making him \"jittery\"   His former psychiatrist retired his practice around 2010, which led to his former primary MD taking over the prescribing responsibilities for this medication.    Of the medicines he has tried thus far mentioned above over the past 20+ years, he feels the Vyvanse has provided the best results in managing his ADHD, improving focus and concentration with the fewest side effects, including a steady effect of this medicine throughout the day without any \"yo-yo\" effect of \"crashing\"    Therefore I am hoping they will provide a formulary exception and provide coverage for the Vyvanse medication.    His choices are as follows:    1.)  Continue Vyvanse at the same dose, if covered by the insurance at what ever level and tier they decide.     2.)  If Vyvanse not covered and is forced to try another medicaiton, then he will need a virtual video visit appointment with me to discuss one of the other covered options (Adderall or Ritalin)    3.)  If Vyvanse not covered and he does not want to try one of the other options, then he can pay roman.      Spoke with Mariam from [facility] Prior authorization services, who will approve the Vyvanse pending final physician review and will fax us a letter (and send to patient) confirming coverage.     Since it is a non-formulary exception, it will be approved at the next to " highest tier level, so cost to be determined.

## 2024-01-24 NOTE — TELEPHONE ENCOUNTER
Mariam, with [facility] Prior Auth team, called the clinic stating that per the prior auth, the pt has tried and failed both Adderall and Methylphenidate.     She is needing to know if the pt tried extended release, or short acting for both of these medications.   Writer unable to locate these medications in pt records.     Per Provider note below, it states the pt has attempted both short and long acting Adderall.   Routing to PCP to advise - are you aware if the pt tried extended release Methylphenidate? Or short acting?    Please call Mariam back to relay Provider response, and info above in bold ([phone], detailed VM okay).     Thank you,  Tati Hernandez, RN

## 2024-01-24 NOTE — TELEPHONE ENCOUNTER
Greta with [facility] [employer] calling to update clinic that PA for Vyvanse has been approved from 1/24/2024 to 1/24/2025. Greta did not have any additional updates.     Routing to PCP as update.

## 2024-01-24 NOTE — TELEPHONE ENCOUNTER
According to the denial note, the quickest way to get an appeal is by phone.   I called the number and spoke with agent and gave the information for the appeal.   It will go up for review.     In review of the patient's chart, he has been on this vyvanse since at least 2012 when he was changed from Adderall.  He has had ADHD for many years, previously receiving medications and management through a psychiatrist until 2008 or so, then from his former primary MD prior to me.  (His former primary MD retired at end of 2021, when I was assigned his care.   According to very scant prior records, has has Prior to that he had tried Adderall (both short and long acting), and very distantly ritalin.   He has been taking Vyvanse with good effect and no side effects.      If the appeal and PA are approved, then he can continue on vyvanse.     If the appeal is denied, and vyvanse is not approved, then he will need to consider another medication, for which we will need to have a virtual video visit to go over other options.     Hopefully it will be approved.  They prommised to fax a determination in a couple of days.

## 2024-01-26 RX ORDER — LISDEXAMFETAMINE DIMESYLATE 50 MG/1
50 CAPSULE ORAL EVERY MORNING
Qty: 90 CAPSULE | Refills: 0 | Status: SHIPPED | OUTPATIENT
Start: 2024-01-26 | End: 2024-02-26

## 2024-01-26 NOTE — TELEPHONE ENCOUNTER
I got a fax yesterday from [facility] stating that the Lisdexamfetamine 50 mg capsules was approved for formulary exception for the next year, through 1/24/2025.    You should be receiving this same letter.   The notification letter does not state which tier or level it will be covered, or what your cost will be.  The prior authorization person told me that in situations like this it usually it will be covered at the second to highest tier, so I am not sure what that means for you in your final cost of this medication, but some coverage is better than no coverage.   I re-sent the prescription for the 50 mg capsules to [facility].  The prescription should now go through since the formulary exception should prevent it from being blocked. I would check with them for what your final cost will be.  Since you are on Medicare, its doubtful that there would be any  discount coupons available for you, but it doesn't hurt to check.

## 2024-01-30 NOTE — TELEPHONE ENCOUNTER
Formulary exception for this medication was approved.   He will have to pay it at the second from the highest tier.  Prescription(s) sent electronically to specified pharmacy.

## 2024-02-21 DIAGNOSIS — N40.0 BENIGN PROSTATIC HYPERPLASIA WITHOUT LOWER URINARY TRACT SYMPTOMS: ICD-10-CM

## 2024-02-21 DIAGNOSIS — I10 ESSENTIAL HYPERTENSION WITH GOAL BLOOD PRESSURE LESS THAN 140/90: Chronic | ICD-10-CM

## 2024-02-22 ENCOUNTER — LAB (OUTPATIENT)
Dept: LAB | Facility: CLINIC | Age: 74
End: 2024-02-22
Payer: MEDICARE

## 2024-02-22 DIAGNOSIS — Z12.5 SCREENING FOR PROSTATE CANCER: ICD-10-CM

## 2024-02-22 DIAGNOSIS — E78.5 HYPERLIPIDEMIA LDL GOAL <100: Primary | ICD-10-CM

## 2024-02-22 DIAGNOSIS — I10 ESSENTIAL HYPERTENSION WITH GOAL BLOOD PRESSURE LESS THAN 140/90: ICD-10-CM

## 2024-02-22 LAB
ALT SERPL W P-5'-P-CCNC: 39 U/L (ref 0–70)
ANION GAP SERPL CALCULATED.3IONS-SCNC: 11 MMOL/L (ref 7–15)
BUN SERPL-MCNC: 14.5 MG/DL (ref 8–23)
CALCIUM SERPL-MCNC: 9.5 MG/DL (ref 8.8–10.2)
CHLORIDE SERPL-SCNC: 99 MMOL/L (ref 98–107)
CHOLEST SERPL-MCNC: 149 MG/DL
CREAT SERPL-MCNC: 0.76 MG/DL (ref 0.67–1.17)
DEPRECATED HCO3 PLAS-SCNC: 26 MMOL/L (ref 22–29)
EGFRCR SERPLBLD CKD-EPI 2021: >90 ML/MIN/1.73M2
ERYTHROCYTE [DISTWIDTH] IN BLOOD BY AUTOMATED COUNT: 12.3 % (ref 10–15)
FASTING STATUS PATIENT QL REPORTED: YES
GLUCOSE SERPL-MCNC: 87 MG/DL (ref 70–99)
HCT VFR BLD AUTO: 42.8 % (ref 40–53)
HDLC SERPL-MCNC: 52 MG/DL
HGB BLD-MCNC: 14.6 G/DL (ref 13.3–17.7)
LDLC SERPL CALC-MCNC: 79 MG/DL
MCH RBC QN AUTO: 31.1 PG (ref 26.5–33)
MCHC RBC AUTO-ENTMCNC: 34.1 G/DL (ref 31.5–36.5)
MCV RBC AUTO: 91 FL (ref 78–100)
NONHDLC SERPL-MCNC: 97 MG/DL
PLATELET # BLD AUTO: 226 10E3/UL (ref 150–450)
POTASSIUM SERPL-SCNC: 4.2 MMOL/L (ref 3.4–5.3)
PSA SERPL DL<=0.01 NG/ML-MCNC: 5.55 NG/ML (ref 0–6.5)
RBC # BLD AUTO: 4.7 10E6/UL (ref 4.4–5.9)
SODIUM SERPL-SCNC: 136 MMOL/L (ref 135–145)
TRIGL SERPL-MCNC: 91 MG/DL
WBC # BLD AUTO: 7.2 10E3/UL (ref 4–11)

## 2024-02-22 PROCEDURE — 84460 ALANINE AMINO (ALT) (SGPT): CPT

## 2024-02-22 PROCEDURE — G0103 PSA SCREENING: HCPCS

## 2024-02-22 PROCEDURE — 85027 COMPLETE CBC AUTOMATED: CPT

## 2024-02-22 PROCEDURE — 36415 COLL VENOUS BLD VENIPUNCTURE: CPT

## 2024-02-22 PROCEDURE — 80048 BASIC METABOLIC PNL TOTAL CA: CPT

## 2024-02-22 PROCEDURE — 80061 LIPID PANEL: CPT

## 2024-02-22 RX ORDER — DUTASTERIDE 0.5 MG/1
1 CAPSULE, LIQUID FILLED ORAL DAILY
Qty: 90 CAPSULE | Refills: 1 | Status: SHIPPED | OUTPATIENT
Start: 2024-02-22 | End: 2024-02-26

## 2024-02-22 SDOH — HEALTH STABILITY: PHYSICAL HEALTH: ON AVERAGE, HOW MANY MINUTES DO YOU ENGAGE IN EXERCISE AT THIS LEVEL?: 50 MIN

## 2024-02-22 SDOH — HEALTH STABILITY: PHYSICAL HEALTH: ON AVERAGE, HOW MANY DAYS PER WEEK DO YOU ENGAGE IN MODERATE TO STRENUOUS EXERCISE (LIKE A BRISK WALK)?: 7 DAYS

## 2024-02-22 ASSESSMENT — SOCIAL DETERMINANTS OF HEALTH (SDOH): HOW OFTEN DO YOU GET TOGETHER WITH FRIENDS OR RELATIVES?: THREE TIMES A WEEK

## 2024-02-23 RX ORDER — LISINOPRIL 10 MG/1
10 TABLET ORAL DAILY
Qty: 90 TABLET | Refills: 1 | Status: SHIPPED | OUTPATIENT
Start: 2024-02-23 | End: 2024-02-26

## 2024-02-26 ENCOUNTER — OFFICE VISIT (OUTPATIENT)
Dept: INTERNAL MEDICINE | Facility: CLINIC | Age: 74
End: 2024-02-26
Attending: INTERNAL MEDICINE
Payer: MEDICARE

## 2024-02-26 VITALS
BODY MASS INDEX: 27.41 KG/M2 | HEART RATE: 72 BPM | OXYGEN SATURATION: 97 % | HEIGHT: 68 IN | RESPIRATION RATE: 14 BRPM | WEIGHT: 180.9 LBS | DIASTOLIC BLOOD PRESSURE: 76 MMHG | SYSTOLIC BLOOD PRESSURE: 138 MMHG | TEMPERATURE: 98.7 F

## 2024-02-26 DIAGNOSIS — E03.9 HYPOTHYROIDISM, UNSPECIFIED TYPE: ICD-10-CM

## 2024-02-26 DIAGNOSIS — F51.01 PRIMARY INSOMNIA: ICD-10-CM

## 2024-02-26 DIAGNOSIS — N40.0 BENIGN PROSTATIC HYPERPLASIA WITHOUT LOWER URINARY TRACT SYMPTOMS: ICD-10-CM

## 2024-02-26 DIAGNOSIS — F90.0 ATTENTION DEFICIT HYPERACTIVITY DISORDER (ADHD), PREDOMINANTLY INATTENTIVE TYPE: ICD-10-CM

## 2024-02-26 DIAGNOSIS — E03.8 SUBCLINICAL HYPOTHYROIDISM: ICD-10-CM

## 2024-02-26 DIAGNOSIS — E78.5 HYPERLIPIDEMIA LDL GOAL <100: Chronic | ICD-10-CM

## 2024-02-26 DIAGNOSIS — I10 ESSENTIAL HYPERTENSION WITH GOAL BLOOD PRESSURE LESS THAN 140/90: Chronic | ICD-10-CM

## 2024-02-26 DIAGNOSIS — F33.42 MAJOR DEPRESSIVE DISORDER, RECURRENT EPISODE, IN FULL REMISSION (H): ICD-10-CM

## 2024-02-26 DIAGNOSIS — M48.02 SPINAL STENOSIS OF CERVICAL REGION: ICD-10-CM

## 2024-02-26 DIAGNOSIS — Z00.00 MEDICARE ANNUAL WELLNESS VISIT, SUBSEQUENT: Primary | ICD-10-CM

## 2024-02-26 DIAGNOSIS — Z12.5 SCREENING FOR PROSTATE CANCER: ICD-10-CM

## 2024-02-26 PROCEDURE — 99214 OFFICE O/P EST MOD 30 MIN: CPT | Mod: 25 | Performed by: INTERNAL MEDICINE

## 2024-02-26 PROCEDURE — 99207 PR ANNUAL WELLNESS VISIT, PPS, SUBSEQUENT STAT: CPT | Performed by: INTERNAL MEDICINE

## 2024-02-26 RX ORDER — LISINOPRIL 10 MG/1
10 TABLET ORAL DAILY
Qty: 90 TABLET | Refills: 1 | Status: SHIPPED | OUTPATIENT
Start: 2024-02-26 | End: 2024-06-16

## 2024-02-26 RX ORDER — VENLAFAXINE HYDROCHLORIDE 150 MG/1
150 CAPSULE, EXTENDED RELEASE ORAL DAILY
Qty: 90 CAPSULE | Refills: 3 | Status: SHIPPED | OUTPATIENT
Start: 2024-02-26 | End: 2024-09-17

## 2024-02-26 RX ORDER — LISDEXAMFETAMINE DIMESYLATE 40 MG/1
40 CAPSULE ORAL EVERY MORNING
Qty: 90 CAPSULE | Refills: 0 | Status: SHIPPED | OUTPATIENT
Start: 2024-02-26 | End: 2024-04-22

## 2024-02-26 RX ORDER — LEVOTHYROXINE SODIUM 50 UG/1
50 TABLET ORAL DAILY
Qty: 90 TABLET | Refills: 1 | Status: SHIPPED | OUTPATIENT
Start: 2024-02-26 | End: 2024-06-16

## 2024-02-26 RX ORDER — DUTASTERIDE 0.5 MG/1
1 CAPSULE, LIQUID FILLED ORAL DAILY
Qty: 90 CAPSULE | Refills: 3 | Status: SHIPPED | OUTPATIENT
Start: 2024-02-26

## 2024-02-26 RX ORDER — DOXEPIN HYDROCHLORIDE 25 MG/1
50 CAPSULE ORAL AT BEDTIME
Qty: 180 CAPSULE | Refills: 1 | Status: SHIPPED | OUTPATIENT
Start: 2024-02-26

## 2024-02-26 RX ORDER — CYCLOBENZAPRINE HCL 10 MG
5-10 TABLET ORAL 3 TIMES DAILY PRN
Qty: 30 TABLET | Refills: 1 | Status: SHIPPED | OUTPATIENT
Start: 2024-02-26

## 2024-02-26 RX ORDER — ATORVASTATIN CALCIUM 80 MG/1
80 TABLET, FILM COATED ORAL DAILY
Qty: 90 TABLET | Refills: 3 | Status: SHIPPED | OUTPATIENT
Start: 2024-02-26

## 2024-02-26 ASSESSMENT — PAIN SCALES - GENERAL: PAINLEVEL: NO PAIN (0)

## 2024-02-26 ASSESSMENT — ANXIETY QUESTIONNAIRES
GAD7 TOTAL SCORE: 1
2. NOT BEING ABLE TO STOP OR CONTROL WORRYING: NOT AT ALL
1. FEELING NERVOUS, ANXIOUS, OR ON EDGE: NOT AT ALL
4. TROUBLE RELAXING: NOT AT ALL
7. FEELING AFRAID AS IF SOMETHING AWFUL MIGHT HAPPEN: SEVERAL DAYS
IF YOU CHECKED OFF ANY PROBLEMS ON THIS QUESTIONNAIRE, HOW DIFFICULT HAVE THESE PROBLEMS MADE IT FOR YOU TO DO YOUR WORK, TAKE CARE OF THINGS AT HOME, OR GET ALONG WITH OTHER PEOPLE: NOT DIFFICULT AT ALL
5. BEING SO RESTLESS THAT IT IS HARD TO SIT STILL: NOT AT ALL
6. BECOMING EASILY ANNOYED OR IRRITABLE: NOT AT ALL
3. WORRYING TOO MUCH ABOUT DIFFERENT THINGS: NOT AT ALL
7. FEELING AFRAID AS IF SOMETHING AWFUL MIGHT HAPPEN: SEVERAL DAYS
8. IF YOU CHECKED OFF ANY PROBLEMS, HOW DIFFICULT HAVE THESE MADE IT FOR YOU TO DO YOUR WORK, TAKE CARE OF THINGS AT HOME, OR GET ALONG WITH OTHER PEOPLE?: NOT DIFFICULT AT ALL

## 2024-02-26 ASSESSMENT — PATIENT HEALTH QUESTIONNAIRE - PHQ9
10. IF YOU CHECKED OFF ANY PROBLEMS, HOW DIFFICULT HAVE THESE PROBLEMS MADE IT FOR YOU TO DO YOUR WORK, TAKE CARE OF THINGS AT HOME, OR GET ALONG WITH OTHER PEOPLE: NOT DIFFICULT AT ALL
SUM OF ALL RESPONSES TO PHQ QUESTIONS 1-9: 0
SUM OF ALL RESPONSES TO PHQ QUESTIONS 1-9: 0

## 2024-02-26 NOTE — PROGRESS NOTES
Preventive Care Visit  [facility]  Marcelo Moseley MD, Internal Medicine  Feb 26, 2024    Assessment & Plan     (Z00.00) Medicare annual wellness visit, subsequent  (primary encounter diagnosis)  Comment: Discussed cardiac disease risk factors and cardiac disease risk factor modification, including diabetes screening, blood pressure screening (and management if indicated), and cholesterol screening.   Reviewed immunzation guidelines, including pneumococcal vaccines, annual influenza, and shingles vaccines.   Discussed routine cancer screenings, including skin cancer, colon cancer screening for everyone until age 80, prostate cancer screening in men until age 75, mammogram and PAP/pelvic for women until age 75.   Recommended regular dentist visits to care for remaining teeth.   Recommended regular screening for vision and glaucoma.   Recommended safe driving and accident avoidance.    Counseling:    Reviewed preventive health counseling, as reflected in patient instructions       Regular exercise       Healthy diet/nutrition       Vision screening       Hearing screening       Immunizations  All up to date including Covid, flu, and RSV.            Colorectal cancer screening       Prostate cancer screening         Patient has been advised of split billing requirements and indicates understanding: Yes   Plan:     (F33.42) Major depressive disorder, recurrent episode, in full remission (H24)  Comment: This condition is currently controlled on the current medical regimen.  Continue current therapy.   Plan: venlafaxine (EFFEXOR XR) 150 MG 24 hr capsule            (F90.0) Attention deficit hyperactivity disorder (ADHD), predominantly inattentive type  Comment: will try to reduce his dose as he probably does not this high a dose.   Plan: lisdexamfetamine (VYVANSE) 40 MG capsule            (I10) Essential hypertension with goal blood pressure less than 140/90  Comment: This condition is  "currently controlled on the current medical regimen.  Continue current therapy.   Plan: lisinopril (ZESTRIL) 10 MG tablet            (F51.01) Primary insomnia  Comment: stable on his current treatment.  This class of medicaiton is prone to more potential side effects, especially over age 75, but so far he does not feel any adverse side effects   Conitnue to monitor.   Try to use lowest dose needed.   Plan: doxepin (SINEQUAN) 25 MG capsule            (N40.0) Benign prostatic hyperplasia without lower urinary tract symptoms  Comment: This condition is currently controlled on the current medical regimen.  Continue current therapy.   Plan: dutasteride (AVODART) 0.5 MG capsule            (E78.5) Hyperlipidemia LDL goal <100  Comment: This condition is currently controlled on the current medical regimen.  Continue current therapy.   Plan: atorvastatin (LIPITOR) 80 MG tablet            (E03.8) Subclinical hypothyroidism  Comment: This condition is currently controlled on the current medical regimen.  Continue current therapy.   Plan: levothyroxine (SYNTHROID/LEVOTHROID) 50 MCG         tablet            (E03.9) Hypothyroidism, unspecified type  Comment:   Plan: levothyroxine (SYNTHROID/LEVOTHROID) 50 MCG         tablet            (M48.02) Spinal stenosis of cervical region  Comment: asking for refill on flexeril which he uses occasionally.   Plan: cyclobenzaprine (FLEXERIL) 10 MG tablet               Patient has been advised of split billing requirements and indicates understanding: Yes          BMI  Estimated body mass index is 27.91 kg/m  as calculated from the following:    Height as of this encounter: 1.715 m (5' 7.5\").    Weight as of this encounter: 82.1 kg (180 lb 14.4 oz).       Counseling  Appropriate preventive services were discussed with this patient, including applicable screening as appropriate for fall prevention, nutrition, physical activity, Tobacco-use cessation, weight loss and cognition.  Checklist " reviewing preventive services available has been given to the patient.  Reviewed patient's diet, addressing concerns and/or questions.   The patient was provided with written information regarding signs of hearing loss.           Pancho Davidson is a 74 year old, presenting for the following:  Medicare Visit        2/26/2024     2:33 PM   Additional Questions   Roomed by Alanis SHERWOOD CMA         Health Care Directive  Patient has a Health Care Directive on file      HPI    1.)  history of ADD.  Has bene on vyvanse for a number of yeares formerly on Adderall short and long acting manyy years earlier moist recently to vyvanse which has been controlling his symptoms well without reported side effects     2.)  history of depression, on effexor without reported side effects.  Dose reduced from 225 to 150 mg daily last fall, he has been doing well since then.     3.)  Hypertension:  History of hypertension, on medication.  No reported side effects from medications.    Reviewed last 6 BP readings in chart:  BP Readings from Last 6 Encounters:   02/26/24 138/76   08/18/23 132/66   02/07/23 130/64   06/28/22 132/64   12/06/21 135/65   05/19/21 138/66     No active cardiac complaints or symptoms with exercise.     4.)  Hyperlipidemia:  Has history of hyperlipidemia.    The patient is taking a medication for this.  Denies any significant side effects from his medication.      Latest labs reviewed:    Recent Labs   Lab Test 02/22/24  1346 02/03/23  1341   CHOL 149 179   HDL 52 57   LDL 79 93   TRIG 91 145        Lab Results   Component Value Date    AST 37 05/21/2022    AST 38 05/19/2021           5.)  chronci insomnia, has beenon doxepin usually 75 mg nightly in winter moths, 50 mg in the other months.  Denies side effects               2/22/2024   General Health   How would you rate your overall physical health? Good   Feel stress (tense, anxious, or unable to sleep) Not at all         2/22/2024   Nutrition   Diet: Regular  (no restrictions)    Low fat/cholesterol    Carbohydrate counting    Vegetarian/vegan    Gluten-free/reduced         2/22/2024   Exercise   Days per week of moderate/strenous exercise 7 days   Average minutes spent exercising at this level 50 min         2/22/2024   Social Factors   Frequency of gathering with friends or relatives Three times a week   Worry food won't last until get money to buy more No   Food not last or not have enough money for food? No   Do you have housing?  Yes   Are you worried about losing your housing? No   Lack of transportation? No   Unable to get utilities (heat,electricity)? No         2/22/2024   Fall Risk   Fallen 2 or more times in the past year? No   Trouble with walking or balance? No          2/22/2024   Activities of Daily Living- Home Safety   Needs help with the following daily activites None of the above   Safety concerns in the home None of the above         2/22/2024   Dental   Dentist two times every year? Yes         2/22/2024   Hearing Screening   Hearing concerns? (!) IT'S HARD TO FOLLOW A CONVERSATION IN A NOISY RESTAURANT OR CROWDED ROOM.         2/22/2024   Driving Risk Screening   Patient/family members have concerns about driving No         2/22/2024   General Alertness/Fatigue Screening   Have you been more tired than usual lately? No         2/22/2024   Urinary Incontinence Screening   Bothered by leaking urine in past 6 months No         2/22/2024   TB Screening   Were you born outside of [place]?  No       Today's PHQ-9 Score:       2/26/2024     1:56 PM   PHQ-9 SCORE   PHQ-9 Total Score MyChart 0   PHQ-9 Total Score 0         2/22/2024   Substance Use   Alcohol more than 3/day or more than 7/wk Not Applicable   Do you have a current opioid prescription? No   How severe/bad is pain from 1 to 10? 0/10 (No Pain)   Do you use any other substances recreationally? No     Social History     Tobacco Use    Smoking status: Never    Smokeless tobacco: Never   Vaping Use     Vaping Use: Never used   Substance Use Topics    Alcohol use: No    Drug use: No           2/22/2024   AAA Screening   Family history of Abdominal Aortic Aneurysm (AAA)? No   ASCVD Risk   The 10-year ASCVD risk score (Ruth CUNNINGHAM, et al., 2019) is: 26.9%    Values used to calculate the score:      Age: 74 years      Sex: Male      Is Non- : No      Diabetic: No      Tobacco smoker: No      Systolic Blood Pressure: 138 mmHg      Is BP treated: Yes      HDL Cholesterol: 52 mg/dL      Total Cholesterol: 149 mg/dL            Reviewed and updated as needed this visit by Provider                    **I reviewed the information recorded in the patient's EPIC chart (including but not limited to medical history, surgical history, family history, problem list, medication list, and allergy list) and updated the information as indicated based on the patients reported information.           Current providers sharing in care for this patient include:  Patient Care Team:  Marcelo Moseley MD as PCP - General (Internal Medicine)  Marcelo Moseley MD as Assigned PCP    The following health maintenance items are reviewed in Epic and correct as of today:  Health Maintenance   Topic Date Due    DTAP/TDAP/TD IMMUNIZATION (1 - Tdap) 07/26/2017    TSH W/FREE T4 REFLEX  08/16/2024    PHQ-9  08/26/2024    ANNUAL REVIEW OF HM ORDERS  01/10/2025    ALT  02/22/2025    BMP  02/22/2025    LIPID  02/22/2025    PSA  02/22/2025    CBC  02/22/2025    MEDICARE ANNUAL WELLNESS VISIT  02/26/2025    NATHALIA ASSESSMENT  02/26/2025    FALL RISK ASSESSMENT  02/26/2025    GLUCOSE  02/22/2027    ADVANCE CARE PLANNING  08/18/2028    COLORECTAL CANCER SCREENING  10/01/2031    HEPATITIS C SCREENING  Completed    DEPRESSION ACTION PLAN  Completed    INFLUENZA VACCINE  Completed    Pneumococcal Vaccine: 65+ Years  Completed    ZOSTER IMMUNIZATION  Completed    RSV VACCINE (Pregnancy & 60+)  Completed    COVID-19 Vaccine   "Completed    IPV IMMUNIZATION  Aged Out    HPV IMMUNIZATION  Aged Out    MENINGITIS IMMUNIZATION  Aged Out    RSV MONOCLONAL ANTIBODY  Aged Out         Review of Systems  Constitutional, neuro, ENT, endocrine, pulmonary, cardiac, gastrointestinal, genitourinary, musculoskeletal, integument and psychiatric systems are negative, except as otherwise noted.     Objective    Exam  /76   Pulse 72   Temp 98.7  F (37.1  C) (Tympanic)   Resp 14   Ht 1.715 m (5' 7.5\")   Wt 82.1 kg (180 lb 14.4 oz)   SpO2 97%   BMI 27.91 kg/m     Estimated body mass index is 27.91 kg/m  as calculated from the following:    Height as of this encounter: 1.715 m (5' 7.5\").    Weight as of this encounter: 82.1 kg (180 lb 14.4 oz).    Physical Exam  GENERAL alert and no distress  EYES:  Normal sclera,conjunctiva, EOMI  HENT: oral and posterior pharynx without lesions or erythema, facies symmetric  NECK: Neck supple. No LAD, without thyroidmegaly.  RESP: Clear to ausculation bilaterally without wheezes or crackles. Normal BS in all fields.  CV: RRR normal S1S2 without murmurs, rubs or gallops.  LYMPH: no cervical lymph adenopathy appreciated  MS: extremities- no gross deformities of the visible extremities noted,   EXT:  no lower extremity edema  PSYCH: Alert and oriented times 3; speech- coherent  SKIN:  No obvious significant skin lesions on visible portions of face         2/26/2024   Mini Cog   Clock Draw Score 2 Normal   3 Item Recall 3 objects recalled   Mini Cog Total Score 5            Signed Electronically by: Marcelo Moseley MD    "

## 2024-02-26 NOTE — PATIENT INSTRUCTIONS
"     Reduce the dose vyvanse to 40 mg once per day     Reduce the Doxepin to two tablets at bedtime if possible.      Continue all other medications at the same doses.  Contact your usual pharmacy if you need refills.        Return to see me in 1 year, sooner if needed.  Use [employer] or Call [phone] to schedule the appointment with me.       5 GOALS TO PREVENT VASCULAR DISEASE:     1.  Aggressive blood pressure control, under 130/80 ideally.  Using medications if needed.    Your blood pressure is under good control    BP Readings from Last 4 Encounters:   02/26/24 138/76   08/18/23 132/66   02/07/23 130/64   06/28/22 132/64       2.  Aggressive LDL cholesterol (\"bad cholesterol\") lowering as indicated.    Your goal is an LDL under 130 for sure, preferably under 100.  (If you have diabetes or previous vascular disease, the the LDL goals would be under 100 for sure, preferably under 70.)    New guidelines identify four high-risk groups who could benefit from statins:   *people with pre-existing heart disease, such as those who have had a heart attack;   *people ages 40 to 75 who have diabetes of any type  *patients ages 40 to 75 with at least a 7.5% risk of developing cardiovascular disease over the next decade, according to a formula described in the guidelines  *patients with the sort of super-high cholesterol that sometimes runs in families, as evidenced by an LDL of 190 milligrams per deciliter or higher    Your cholesterol levels are well controlled.    Recent Labs   Lab Test 02/22/24  1346 02/03/23  1341   CHOL 149 179   HDL 52 57   LDL 79 93   TRIG 91 145       3.  Aggressive diabetic prevention, screening and/or management.      You do not have diabetes as of the most recent blood tests.     4.  No smoking    5.  Consider daily preventative aspirin over age 50 if you have enough cardiac risk factors to place you at higher risk for the presence of vascular disease.    If you have any reason not to take " aspirin such easy bruising or bleeding, stomach problems, other anticoagulant medications, or any other side effects, then you should not take Aspirin.     --Based on your current cardiac disease risk profile and/or age over 75, you do NOT need to take daily preventative aspirin.        Preventive Health Recommendations:   Male Ages 65 - 75    Yearly exam:             See your health care provider every year in order to  o   Review health changes.   o   Discuss preventive care.    o   Review your medicines if your doctor has prescribed any.  Talk with your health care provider about whether you should have a test to screen for prostate cancer (PSA).  Every 3 years, have a diabetes test (fasting glucose). If you are at risk for diabetes, you should have this test more often.  At least Every 5 years, have a cholesterol test. Have this test more often if you have medical conditions that place you at higher risk for high cholesterol or heart disease.   Regular colon cancer screening starting age 45 with either a colonoscopy, or stool test (either Cologuard every 3 years or yearly FIT stool test from ).  The intervals for colon cancer screening will be determined by family history and prior colon cancer screening results.   Routine prostate cancer screening with a PSA blood test every 1-2 years.   While the PSA blood test is not a direct cancer screening blood test, it detects inflammation within in the prostate, which could indicate possible cancer.  If the test is abnormal, you do not necessarily have prostate cancer, but would need further evaluation.    Talk to with your health care provider about screening for Abdominal Aortic Aneurysm if you have a family history of AAA or have a history of smoking.    Shots:   Get a flu shot each year.   Covid vaccines are now recommended annually.  Get the most updated Covid vaccine when it becomes available, consider getting this at the same time as the annual influenza  "vaccine.   Get a tetanus shot every 10 years.  Everyone over 65 should make sure to receive pneumonia vaccines to prevent the most common type of bacterial pneumonia: Pneumococcal pneumonia. There are now two you should receive - Pneumovax (PPSV 23) and Prevnar (PCV 20)  Strongly consider the Shingrix shingles vaccine to give you the best chance of avoiding future shingles infection (as many as 1 and 3 adults over age 50 may develop this condition in their lifetime).    If you have Medicare insurance, investigate the cost and coverage for Shingrix shingles vaccines with a pharmacist at a pharmacy.  They can tell you the coverage and cost and then give it to you if the price is acceptable.    Medicare sometimes does not cover these Shingrix shingles vaccines, and with Medicare insurance it is usually cheaper to receive this shingles vaccine from a pharmacist in a pharmacy rather than in our clinic.    At this time, you only need the 2 Shingrix vaccines and then you are done.     Nutrition:   Eat at least 5 servings of fruits and vegetables each day.   Eat whole-grain bread, whole-wheat pasta and brown rice instead of white grains and rice.   Talk to your provider about Calcium and Vitamin D.      --Good Grains:  Oats, brown rice, Quinoa (these do not raise the blood sugar as much)     --Bad grains:  Anything made from wheat or white rice     (because these raise the blood sugars significantly, and the possible gluten issue from wheat for some people).      --Proteins:  Aim for \"lean proteins\" including chicken, fish, seafood, pork, turkey, and eggs (in moderation); Eat red meat only occasionally    Lifestyle  Exercise for at least 150 minutes a week (30 minutes a day, 5 days a week). This will help you control your weight and prevent disease.   Limit alcohol to one drink per day.   No smoking.   Wear sunscreen to prevent skin cancer.   See your dentist every six months for an exam and cleaning.   See your eye doctor " every 1 to 2 years to screen for conditions such as glaucoma, macular degeneration, cataracts, etc

## 2024-04-22 ENCOUNTER — MYC REFILL (OUTPATIENT)
Dept: INTERNAL MEDICINE | Facility: CLINIC | Age: 74
End: 2024-04-22
Payer: MEDICARE

## 2024-04-22 DIAGNOSIS — F90.0 ATTENTION DEFICIT HYPERACTIVITY DISORDER (ADHD), PREDOMINANTLY INATTENTIVE TYPE: ICD-10-CM

## 2024-04-23 RX ORDER — LISDEXAMFETAMINE DIMESYLATE 40 MG/1
40 CAPSULE ORAL EVERY MORNING
Qty: 90 CAPSULE | Refills: 0 | Status: SHIPPED | OUTPATIENT
Start: 2024-04-23 | End: 2024-07-23

## 2024-06-16 ENCOUNTER — MYC REFILL (OUTPATIENT)
Dept: INTERNAL MEDICINE | Facility: CLINIC | Age: 74
End: 2024-06-16
Payer: MEDICARE

## 2024-06-16 DIAGNOSIS — I10 ESSENTIAL HYPERTENSION WITH GOAL BLOOD PRESSURE LESS THAN 140/90: ICD-10-CM

## 2024-06-16 DIAGNOSIS — E03.8 SUBCLINICAL HYPOTHYROIDISM: ICD-10-CM

## 2024-06-16 DIAGNOSIS — E03.9 HYPOTHYROIDISM, UNSPECIFIED TYPE: ICD-10-CM

## 2024-06-17 RX ORDER — LEVOTHYROXINE SODIUM 50 UG/1
50 TABLET ORAL DAILY
Qty: 90 TABLET | Refills: 0 | Status: SHIPPED | OUTPATIENT
Start: 2024-06-17 | End: 2024-09-16

## 2024-06-17 RX ORDER — LISINOPRIL 10 MG/1
10 TABLET ORAL DAILY
Qty: 90 TABLET | Refills: 0 | Status: SHIPPED | OUTPATIENT
Start: 2024-06-17 | End: 2024-08-28

## 2024-06-17 RX ORDER — LEVOTHYROXINE SODIUM 50 UG/1
50 TABLET ORAL DAILY
Qty: 90 TABLET | Refills: 3 | OUTPATIENT
Start: 2024-06-17

## 2024-06-17 NOTE — TELEPHONE ENCOUNTER
Prescription approved per [facility] Refill Protocol.  Kelsey Omer RN  [facility]

## 2024-07-23 ENCOUNTER — MYC REFILL (OUTPATIENT)
Dept: INTERNAL MEDICINE | Facility: CLINIC | Age: 74
End: 2024-07-23
Payer: MEDICARE

## 2024-07-23 DIAGNOSIS — F90.0 ATTENTION DEFICIT HYPERACTIVITY DISORDER (ADHD), PREDOMINANTLY INATTENTIVE TYPE: ICD-10-CM

## 2024-07-24 RX ORDER — LISDEXAMFETAMINE DIMESYLATE 40 MG/1
40 CAPSULE ORAL EVERY MORNING
Qty: 90 CAPSULE | Refills: 0 | Status: SHIPPED | OUTPATIENT
Start: 2024-07-24 | End: 2024-09-17

## 2024-08-28 DIAGNOSIS — I10 ESSENTIAL HYPERTENSION WITH GOAL BLOOD PRESSURE LESS THAN 140/90: ICD-10-CM

## 2024-08-28 RX ORDER — LISINOPRIL 10 MG/1
10 TABLET ORAL DAILY
Qty: 90 TABLET | Refills: 0 | Status: SHIPPED | OUTPATIENT
Start: 2024-08-28 | End: 2024-09-17

## 2024-09-14 DIAGNOSIS — E03.8 SUBCLINICAL HYPOTHYROIDISM: ICD-10-CM

## 2024-09-14 DIAGNOSIS — E03.9 HYPOTHYROIDISM, UNSPECIFIED TYPE: ICD-10-CM

## 2024-09-16 RX ORDER — LEVOTHYROXINE SODIUM 50 UG/1
50 TABLET ORAL DAILY
Qty: 90 TABLET | Refills: 1 | Status: SHIPPED | OUTPATIENT
Start: 2024-09-16

## 2024-09-17 ENCOUNTER — OFFICE VISIT (OUTPATIENT)
Dept: INTERNAL MEDICINE | Facility: CLINIC | Age: 74
End: 2024-09-17
Attending: INTERNAL MEDICINE
Payer: MEDICARE

## 2024-09-17 VITALS
RESPIRATION RATE: 14 BRPM | HEART RATE: 71 BPM | WEIGHT: 168.2 LBS | SYSTOLIC BLOOD PRESSURE: 134 MMHG | BODY MASS INDEX: 25.49 KG/M2 | DIASTOLIC BLOOD PRESSURE: 70 MMHG | OXYGEN SATURATION: 98 % | TEMPERATURE: 98.4 F | HEIGHT: 68 IN

## 2024-09-17 DIAGNOSIS — E03.9 HYPOTHYROIDISM, UNSPECIFIED TYPE: ICD-10-CM

## 2024-09-17 DIAGNOSIS — F90.0 ATTENTION DEFICIT HYPERACTIVITY DISORDER (ADHD), PREDOMINANTLY INATTENTIVE TYPE: ICD-10-CM

## 2024-09-17 DIAGNOSIS — F33.42 MAJOR DEPRESSIVE DISORDER, RECURRENT EPISODE, IN FULL REMISSION (H): Primary | ICD-10-CM

## 2024-09-17 DIAGNOSIS — Z13.6 CARDIOVASCULAR SCREENING; LDL GOAL LESS THAN 130: ICD-10-CM

## 2024-09-17 DIAGNOSIS — I10 ESSENTIAL HYPERTENSION WITH GOAL BLOOD PRESSURE LESS THAN 140/90: ICD-10-CM

## 2024-09-17 DIAGNOSIS — Z12.5 SCREENING FOR PROSTATE CANCER: ICD-10-CM

## 2024-09-17 PROBLEM — M19.90 ARTHRITIS: Status: ACTIVE | Noted: 2020-05-12

## 2024-09-17 PROBLEM — E66.3 OVERWEIGHT (BMI 25.0-29.9): Status: ACTIVE | Noted: 2020-05-12

## 2024-09-17 PROCEDURE — G2211 COMPLEX E/M VISIT ADD ON: HCPCS | Performed by: INTERNAL MEDICINE

## 2024-09-17 PROCEDURE — 99214 OFFICE O/P EST MOD 30 MIN: CPT | Performed by: INTERNAL MEDICINE

## 2024-09-17 RX ORDER — LISINOPRIL 10 MG/1
10 TABLET ORAL DAILY
Qty: 90 TABLET | Refills: 1 | Status: SHIPPED | OUTPATIENT
Start: 2024-09-17

## 2024-09-17 RX ORDER — LISDEXAMFETAMINE DIMESYLATE 30 MG/1
30 CAPSULE ORAL EVERY MORNING
Qty: 90 CAPSULE | Refills: 0 | Status: SHIPPED | OUTPATIENT
Start: 2024-09-17

## 2024-09-17 RX ORDER — VENLAFAXINE HYDROCHLORIDE 75 MG/1
75 CAPSULE, EXTENDED RELEASE ORAL DAILY
Qty: 90 CAPSULE | Refills: 1 | Status: SHIPPED | OUTPATIENT
Start: 2024-09-17

## 2024-09-17 RX ORDER — DEXTROAMPHETAMINE SACCHARATE, AMPHETAMINE ASPARTATE, DEXTROAMPHETAMINE SULFATE AND AMPHETAMINE SULFATE 1.25; 1.25; 1.25; 1.25 MG/1; MG/1; MG/1; MG/1
5-10 TABLET ORAL 2 TIMES DAILY PRN
Qty: 90 TABLET | Refills: 0 | Status: SHIPPED | OUTPATIENT
Start: 2024-09-17 | End: 2024-09-25

## 2024-09-17 ASSESSMENT — PATIENT HEALTH QUESTIONNAIRE - PHQ9
SUM OF ALL RESPONSES TO PHQ QUESTIONS 1-9: 1
10. IF YOU CHECKED OFF ANY PROBLEMS, HOW DIFFICULT HAVE THESE PROBLEMS MADE IT FOR YOU TO DO YOUR WORK, TAKE CARE OF THINGS AT HOME, OR GET ALONG WITH OTHER PEOPLE: NOT DIFFICULT AT ALL
SUM OF ALL RESPONSES TO PHQ QUESTIONS 1-9: 1

## 2024-09-17 ASSESSMENT — PAIN SCALES - GENERAL: PAINLEVEL: NO PAIN (0)

## 2024-09-17 NOTE — PROGRESS NOTES
Preventive Care Visit  [facility]  Marcelo Moseley MD, Internal Medicine  Sep 17, 2024      Assessment & Plan     (F33.42) Major depressive disorder, recurrent episode, in full remission (H24)  (primary encounter diagnosis)  Comment: This condition is currently controlled on the current medical regimen.  Ok to reduce the dose of venlafaxine to 75 mg once daily.   Plan: venlafaxine (EFFEXOR XR) 75 MG 24 hr capsule,         TSH with free T4 reflex            (E03.9) Hypothyroidism, unspecified type  Comment: recheck labs next visit, titrate dose accordingly.   Plan: Lipid panel reflex to direct LDL Fasting, Basic        metabolic panel, CBC with platelets, TSH with         free T4 reflex            (I10) Essential hypertension with goal blood pressure less than 140/90  Comment: This condition is currently controlled on the current medical regimen.  Continue current therapy.   Plan: lisinopril (ZESTRIL) 10 MG tablet, Lipid panel         reflex to direct LDL Fasting, Basic metabolic         panel, CBC with platelets            (F90.0) Attention deficit hyperactivity disorder (ADHD), predominantly inattentive type  Comment: reviewed his ADD history.   I agree with less in more approach.   Reduce vyvanse to 30 mg ocne daily, add low dose adderall immediate relasd 5-0 mg once per day as needed for additioanl focus and concentration.   Annual CSA signed.   Plan: lisdexamfetamine (VYVANSE) 30 MG capsule,         amphetamine-dextroamphetamine (ADDERALL) 5 MG         tablet            (Z12.5) Screening for prostate cancer  Comment:   Plan: Prostate Specific Antigen Screen            (Z13.6) CARDIOVASCULAR SCREENING; LDL GOAL LESS THAN 130  Comment: Discussed cardiac disease risk factors and cardiac disease risk factor modification.   Plan: Lipid panel reflex to direct LDL Fasting, Basic        metabolic panel, CBC with platelets               Patient has been advised of split billing requirements  "and indicates understanding: Yes        BMI  Estimated body mass index is 25.96 kg/m  as calculated from the following:    Height as of this encounter: 1.715 m (5' 7.5\").    Weight as of this encounter: 76.3 kg (168 lb 3.2 oz).       Counseling  Appropriate preventive services were addressed with this patient via screening, questionnaire, or discussion as appropriate for fall prevention, nutrition, physical activity, Tobacco-use cessation, social engagement, weight loss and cognition.  Checklist reviewing preventive services available has been given to the patient.  Reviewed patient's diet, addressing concerns and/or questions.           Pancho Davidson is a 74 year old, presenting for the following:  Medicare Visit    **Already had medicare wellness exam 2/26/24 9/17/2024    10:26 AM   Additional Questions   Roomed by Alanis SHERWOOD [facility]     Health Care Directive  Patient has a Health Care Directive on file      HPI    Doing well losing weight. Read several recommended nutrition books. Have cut Venlafaxine in half. Abandoned, in steps, most vitamin pills. Not always easy, but always getting better.     1.)  depression:  reduces dose of velafaxine from 150 to 75, feels no difference on lower dose.     2.)  long history of ADD.   Most recenlt on vyvnase, reducing from 70 down to currentr dose of 40 over past couple of years.   Wondesr about reducing dose further and adding low dose immediate release adderall.   He has not experienced any significant side effects of this medication.     PDMP Review         Value Time User    State PDMP site checked  Yes 12/8/2022  4:43 PM Derrick Rios MD             3.)  History of hypothyroidism.  On replacement therapy.  He has not experienced any significant side effects of this medication.   The patient denies of fatigue, weight changes, heat/cold intolerance, hair changes, nail changes, bowel changes.     Latest labs reviewed:    Lab Results   Component Value Date    " TSH 1.79 08/16/2023    TSH 4.79 02/03/2023    TSH 3.92 05/21/2022    TSH 3.36 05/19/2021    TSH 3.55 12/21/2020    TSH 4.20 10/29/2020    TSH 4.44 08/26/2020    TSH 4.80 07/09/2020    TSH 5.17 05/22/2020    TSH 5.28 02/04/2020       4.)  Hypertension:  History of hypertension, on medication.  No reported side effects from medications.    Reviewed last 6 BP readings in chart:  BP Readings from Last 6 Encounters:   09/17/24 134/70   02/26/24 138/76   08/18/23 132/66   02/07/23 130/64   06/28/22 132/64   12/06/21 135/65     No active cardiac complaints or symptoms with exercise.             9/17/2024   General Health   How would you rate your overall physical health? Good   Feel stress (tense, anxious, or unable to sleep) Not at all            9/17/2024   Nutrition   Diet: Regular (no restrictions)            9/17/2024   Exercise   Days per week of moderate/strenous exercise 6 days   Average minutes spent exercising at this level 90 min            9/17/2024   Social Factors   Frequency of gathering with friends or relatives Three times a week   Worry food won't last until get money to buy more No   Food not last or not have enough money for food? No   Do you have housing? (Housing is defined as stable permanent housing and does not include staying ouside in a car, in a tent, in an abandoned building, in an overnight shelter, or couch-surfing.) Yes   Are you worried about losing your housing? No   Lack of transportation? No   Unable to get utilities (heat,electricity)? No            9/17/2024   Fall Risk   Fallen 2 or more times in the past year? No   Trouble with walking or balance? No             9/17/2024   Activities of Daily Living- Home Safety   Needs help with the following daily activites None of the above   Safety concerns in the home None of the above            9/17/2024   Dental   Dentist two times every year? Yes            9/17/2024   Hearing Screening   Hearing concerns? None of the above             9/17/2024   Driving Risk Screening   Patient/family members have concerns about driving No            9/17/2024   General Alertness/Fatigue Screening   Have you been more tired than usual lately? No            9/17/2024   Urinary Incontinence Screening   Bothered by leaking urine in past 6 months No            9/17/2024   TB Screening   Were you born outside of the [place]? No          Today's PHQ-9 Score:       9/17/2024     7:33 AM   PHQ-9 SCORE   PHQ-9 Total Score MyChart 1 (Minimal depression)   PHQ-9 Total Score 1         9/17/2024   Substance Use   Alcohol more than 3/day or more than 7/wk Not Applicable   Do you have a current opioid prescription? No   How severe/bad is pain from 1 to 10? 1/10   Do you use any other substances recreationally? No        Social History     Tobacco Use    Smoking status: Never    Smokeless tobacco: Never   Vaping Use    Vaping status: Never Used   Substance Use Topics    Alcohol use: No    Drug use: No           9/17/2024   AAA Screening   Family history of Abdominal Aortic Aneurysm (AAA)? No      ASCVD Risk   The 10-year ASCVD risk score (Ruth DK, et al., 2019) is: 25.7%    Values used to calculate the score:      Age: 74 years      Sex: Male      Is Non- : No      Diabetic: No      Tobacco smoker: No      Systolic Blood Pressure: 134 mmHg      Is BP treated: Yes      HDL Cholesterol: 52 mg/dL      Total Cholesterol: 149 mg/dL            Reviewed and updated as needed this visit by Provider     Meds                  **I reviewed the information recorded in the patient's EPIC chart (including but not limited to medical history, surgical history, family history, problem list, medication list, and allergy list) and updated the information as indicated based on the patients reported information.         Current providers sharing in care for this patient include:  Patient Care Team:  Marcelo Moseley MD as PCP - General (Internal  "Medicine)  Marcelo Moseley MD as Assigned PCP    The following health maintenance items are reviewed in Epic and correct as of today:  Health Maintenance   Topic Date Due    DTAP/TDAP/TD IMMUNIZATION (1 - Tdap) 07/26/2017    TSH W/FREE T4 REFLEX  08/16/2024    COVID-19 Vaccine (6 - 2024-25 season) 09/01/2024    ALT  02/22/2025    BMP  02/22/2025    LIPID  02/22/2025    PSA  02/22/2025    CBC  02/22/2025    MEDICARE ANNUAL WELLNESS VISIT  02/26/2025    NATHALIA ASSESSMENT  02/26/2025    ANNUAL REVIEW OF HM ORDERS  02/26/2025    PHQ-9  03/17/2025    FALL RISK ASSESSMENT  09/17/2025    GLUCOSE  02/22/2027    ADVANCE CARE PLANNING  08/18/2028    COLORECTAL CANCER SCREENING  10/01/2031    HEPATITIS C SCREENING  Completed    DEPRESSION ACTION PLAN  Completed    INFLUENZA VACCINE  Completed    Pneumococcal Vaccine: 65+ Years  Completed    ZOSTER IMMUNIZATION  Completed    HPV IMMUNIZATION  Aged Out    MENINGITIS IMMUNIZATION  Aged Out    RSV MONOCLONAL ANTIBODY  Aged Out         Review of Systems  Constitutional, HEENT, cardiovascular, pulmonary, gi and gu systems are negative, except as otherwise noted.     Objective    Exam  /70   Pulse 71   Temp 98.4  F (36.9  C) (Oral)   Resp 14   Ht 1.715 m (5' 7.5\")   Wt 76.3 kg (168 lb 3.2 oz)   SpO2 98%   BMI 25.96 kg/m     Estimated body mass index is 25.96 kg/m  as calculated from the following:    Height as of this encounter: 1.715 m (5' 7.5\").    Weight as of this encounter: 76.3 kg (168 lb 3.2 oz).    Physical Exam  GENERAL alert and no distress  EYES:  Normal sclera,conjunctiva, EOMI  HENT: facies symmetric  MS: extremities- no gross deformities of the visible extremities noted,   PSYCH: Alert and oriented times 3; speech- coherent  SKIN:  No obvious significant skin lesions on visible portions of face   NEURO:  Normal facial movements.  Appears to move normally         9/17/2024   Mini Cog   Clock Draw Score 2 Normal   3 Item Recall 3 objects recalled   Mini " Cog Total Score 5                 The longitudinal plan of care for the diagnosis(es)/condition(s) as documented were addressed during this visit. Due to the added complexity in care, I will continue to support Stuart in the subsequent management and with ongoing continuity of care.    Signed Electronically by: Marcelo Moseley MD

## 2024-09-17 NOTE — LETTER
[facility]  09/17/24  Patient: Stuart Moran  YOB: 1950  Medical Record Number: [id number]                                                                                  Non-Opioid Controlled Substance Agreement    This is an agreement between you and your provider regarding safe and appropriate use of controlled substances prescribed by your care team. Controlled substances are?medicines that can cause physical and mental dependence (abuse).     There are strict laws about having and using these medicines. We here at [facility] are  committed to working with you in your efforts to get better. To support you in this work, we'll help you schedule regular office appointments for medicine refills. If we must cancel or change your appointment for any reason, we'll make sure you have enough medicine to last until your next appointment.     As a Provider, I will:   Listen carefully to your concerns while treating you with respect.   Recommend a treatment plan that I believe is in your best interest and may involve therapies other than medicine.    Talk with you often about the possible benefits and the risk of harm of any medicine that we prescribe for you.  Assess the safety of this medicine and check how well it works.    Provide a plan on how to taper (discontinue or go off) using this medicine if the decision is made to stop its use.      ::  As a Patient, I understand controlled substances:     Are prescribed by my care provider to help me function or work and manage my condition(s).?  Are strong medicines and can cause serious side effects.     Need to be taken exactly as prescribed.?Combining controlled substances with certain medicines or chemicals (such as illegal drugs, alcohol, sedatives, sleeping pills, and benzodiazepines) can be dangerous or even fatal.? If I stop taking my medicines suddenly, I may have severe withdrawal symptoms.     The risks, benefits, and  side effects of these medicine(s) were explained to me. I agree that:    I will take part in other treatments as advised by my care team. This may be psychiatry or counseling, physical therapy, behavioral therapy, group treatment or a referral to specialist.    I will keep all my appointments and understand this is part of the monitoring of controlled substances.?My care team may require an office visit for EVERY controlled substance refill. If I miss appointments or don t follow instructions, my care team may stop my medicine    I will take my medicines as prescribed. I will not change the dose or schedule unless my care team tells me to. There will be no refills if I run out early.      I may be asked to come to the clinic and complete a urine drug test or complete a pill count. If I don t give a urine sample or participate in a pill count, the care team may stop my medicine.    I will only receive controlled substance prescriptions from this clinic. If I am treated by another provider, I will tell them that I am taking controlled substances and that I have a treatment agreement with this provider. I will inform my [facility] care team within one business day if I am given a prescription for any controlled substance by another healthcare provider. My [facility] care team can contact other providers and pharmacists about my use of any medicines.    It is up to me to make sure that I don't run out of my medicines on weekends or holidays.?If my care team is willing to refill my prescription without a visit, I must request refills only during office hours. Refills may take up to 3 business days to process. I will use one pharmacy to fill all my controlled substance prescriptions. I will notify the clinic about any changes to my insurance or medicine availability.    I am responsible for my prescriptions. If the medicine/prescription is lost, stolen or destroyed, it will not be replaced.?I also agree not  to share controlled substance medicines with anyone.     I am aware I should not use any illegal or recreational drugs. I agree not to drink alcohol unless my care team says I can.     If I enroll in the [place] Medical Cannabis program, I will tell my care team before my next refill.    I will tell my care team right away if I become pregnant, have a new medical problem treated outside of my regular clinic, or have a change in my medicines.     I understand that this medicine can affect my thinking, judgment and reaction time.? Alcohol and drugs affect the brain and body, which can affect the safety of my driving. Being under the influence of alcohol or drugs can affect my decision-making, behaviors, personal safety and the safety of others. Driving while impaired (DWI) can occur if a person is driving, operating or in physical control of a car, motorcycle, boat, snowmobile, ATV, motorbike, off-road vehicle or any other motor vehicle ([place] Statute 169A.20). I understand the risk if I choose to drive or operate any vehicle or machinery.    I understand that if I do not follow any of the conditions above, my prescriptions or treatment may be stopped or changed.   I agree that my provider, clinic care team and pharmacy may work with any city, state or federal law enforcement agency that investigates the misuse, sale or other diversion of my controlled medicine. I will allow my provider to discuss my care with, or share a copy of, this agreement with any other treating provider, pharmacy or emergency room where I receive care.     I have read this agreement and have asked questions about anything I did not understand.    ________________________________________________________  Patient Signature - Stuart Moran     ___________________                   Date     ________________________________________________________  Provider Signature - Marcelo Moseley MD       ___________________                   Date      ________________________________________________________  Witness Signature (required if provider not present while patient signing)          ___________________                   Date

## 2024-09-17 NOTE — PATIENT INSTRUCTIONS
"     Ok to reduce Venlafaxine to 75 mg ocne per day     OK to reduce the Doxepin to 25 mg at bedtime.      Reduce Vyvanse to 30 mg once per day.      Take low dose immediate release Adderall 5 or 10 mg once per day as needed for additiaon lfocus and concentration.             Return to see me in 6 months, schedule a follow up visit sooner if needed for anything else.  Please get fasting labs done at the [facility] or any other [facility] lab 1-2 days before this appointment (schedule a \"lab appointment\")   Eat nothing for at least 8 hours prior to having these labs drawn.  Use [facility] or Call [phone] to schedule the appointment with me and lab appointment.     "

## 2024-09-21 ENCOUNTER — MYC MEDICAL ADVICE (OUTPATIENT)
Dept: INTERNAL MEDICINE | Facility: CLINIC | Age: 74
End: 2024-09-21
Payer: MEDICARE

## 2024-09-21 DIAGNOSIS — F90.0 ATTENTION DEFICIT HYPERACTIVITY DISORDER (ADHD), PREDOMINANTLY INATTENTIVE TYPE: ICD-10-CM

## 2024-09-25 RX ORDER — DEXTROAMPHETAMINE SACCHARATE, AMPHETAMINE ASPARTATE, DEXTROAMPHETAMINE SULFATE AND AMPHETAMINE SULFATE 1.25; 1.25; 1.25; 1.25 MG/1; MG/1; MG/1; MG/1
5-10 TABLET ORAL 2 TIMES DAILY PRN
Qty: 90 TABLET | Refills: 0 | Status: SHIPPED | OUTPATIENT
Start: 2024-09-25 | End: 2024-10-04

## 2024-09-25 NOTE — TELEPHONE ENCOUNTER
I have not received any communication from [facility].   New RX sent specifically mentioning OK to take.

## 2024-09-27 ENCOUNTER — TELEPHONE (OUTPATIENT)
Dept: INTERNAL MEDICINE | Facility: CLINIC | Age: 74
End: 2024-09-27
Payer: MEDICARE

## 2024-09-27 NOTE — TELEPHONE ENCOUNTER
FYI, Quantity Limitation needed. RN starting PA for that. Pt to reply with how they take the medication -- only 2 tablets are covered daily by insurance, script is written for up to 4 as needed per day.    Also updated [facility] -- they did not have the right fax number.    Ivy Velasco RN

## 2024-09-27 NOTE — TELEPHONE ENCOUNTER
Prior Authorization Retail Medication Request    Medication/Dose: amphetamine-dextroamphetamine (ADDERALL) 5 MG tablet QUANTITY LIMITATION  Diagnosis and ICD code (if different than what is on RX):    New/renewal/insurance change PA/secondary ins. PA:  Previously Tried and Failed:    Rationale:      Insurance   Primary:   Insurance ID:      Secondary (if applicable):  Insurance ID:      Pharmacy Information (if different than what is on RX)  Name:    Phone:    Fax:    Clinic Information  Preferred routing pool for dept communication:

## 2024-09-30 NOTE — TELEPHONE ENCOUNTER
Pt called the clinic to clarify.     He is okay with keeping the amphetamine-dextroamphetamine (ADDERALL) 5 MG tablet with [facility] as currently ordered.     Informed pt that PA has been started for this medication.   He verbalized understanding and stated he will notify [facility] of this.     Thank you,  Tati Hernandez RN

## 2024-10-02 NOTE — TELEPHONE ENCOUNTER
Retail Pharmacy Prior Authorization Team   Phone: [phone]    PA Initiation    Medication: AMPHETAMINE-DEXTROAMPHETAMINE 5 MG PO TABS  Insurance Company: Other (see comments)Comment:  [employer] [phone]  Pharmacy Filling the Rx: [facility] - [place], [place] - [address] [place] [address]  Filling Pharmacy Phone: [phone]  Filling Pharmacy Fax:    Start Date: 10/2/2024

## 2024-10-02 NOTE — TELEPHONE ENCOUNTER
Prior Authorization Approval    Authorization Effective Date: 7/4/2024  Authorization Expiration Date: 10/2/2025  Medication: amphetamine-dextroamphetamine (ADDERALL) 5 MG tablet-APPROVED  Reference #:     Insurance Company: Other (see comments)Comment:  [employer] [phone]  Which Pharmacy is filling the prescription (Not needed for infusion/clinic administered): [email or web address] - [address]  Pharmacy Notified: Yes  Patient Notified: Instructed pharmacy to notify patient when script is ready to /ship.

## 2024-10-02 NOTE — TELEPHONE ENCOUNTER
Please see mychart from patient and advise appropriate course of action.      Vasiliy Carlos RN  [facility] Triage Nurse

## 2024-10-04 RX ORDER — DEXTROAMPHETAMINE SACCHARATE, AMPHETAMINE ASPARTATE, DEXTROAMPHETAMINE SULFATE AND AMPHETAMINE SULFATE 2.5; 2.5; 2.5; 2.5 MG/1; MG/1; MG/1; MG/1
5-10 TABLET ORAL 2 TIMES DAILY PRN
Qty: 60 TABLET | Refills: 0 | Status: SHIPPED | OUTPATIENT
Start: 2024-10-04

## 2024-10-25 ENCOUNTER — TRANSFERRED RECORDS (OUTPATIENT)
Dept: HEALTH INFORMATION MANAGEMENT | Facility: CLINIC | Age: 74
End: 2024-10-25
Payer: MEDICARE

## 2024-12-23 ENCOUNTER — TRANSFERRED RECORDS (OUTPATIENT)
Dept: HEALTH INFORMATION MANAGEMENT | Facility: CLINIC | Age: 74
End: 2024-12-23
Payer: MEDICARE

## 2024-12-23 DIAGNOSIS — F51.01 PRIMARY INSOMNIA: ICD-10-CM

## 2024-12-23 RX ORDER — DOXEPIN HYDROCHLORIDE 25 MG/1
25-50 CAPSULE ORAL
Qty: 180 CAPSULE | Refills: 1 | Status: SHIPPED | OUTPATIENT
Start: 2024-12-23

## 2025-01-27 ENCOUNTER — PATIENT OUTREACH (OUTPATIENT)
Dept: CARE COORDINATION | Facility: CLINIC | Age: 75
End: 2025-01-27
Payer: MEDICARE

## 2025-02-02 ENCOUNTER — MYC MEDICAL ADVICE (OUTPATIENT)
Dept: INTERNAL MEDICINE | Facility: CLINIC | Age: 75
End: 2025-02-02
Payer: MEDICARE

## 2025-03-03 ENCOUNTER — MYC REFILL (OUTPATIENT)
Dept: INTERNAL MEDICINE | Facility: CLINIC | Age: 75
End: 2025-03-03
Payer: MEDICARE

## 2025-03-03 DIAGNOSIS — F90.0 ATTENTION DEFICIT HYPERACTIVITY DISORDER (ADHD), PREDOMINANTLY INATTENTIVE TYPE: ICD-10-CM

## 2025-03-04 RX ORDER — DEXTROAMPHETAMINE SACCHARATE, AMPHETAMINE ASPARTATE, DEXTROAMPHETAMINE SULFATE AND AMPHETAMINE SULFATE 2.5; 2.5; 2.5; 2.5 MG/1; MG/1; MG/1; MG/1
5-10 TABLET ORAL 2 TIMES DAILY PRN
Qty: 60 TABLET | Refills: 0 | Status: SHIPPED | OUTPATIENT
Start: 2025-03-04

## 2025-03-04 RX ORDER — LISDEXAMFETAMINE DIMESYLATE 30 MG/1
30 CAPSULE ORAL EVERY MORNING
Qty: 90 CAPSULE | Refills: 0 | Status: SHIPPED | OUTPATIENT
Start: 2025-03-04

## 2025-03-05 DIAGNOSIS — N40.0 BENIGN PROSTATIC HYPERPLASIA WITHOUT LOWER URINARY TRACT SYMPTOMS: ICD-10-CM

## 2025-03-05 DIAGNOSIS — E78.5 HYPERLIPIDEMIA LDL GOAL <100: ICD-10-CM

## 2025-03-05 RX ORDER — ATORVASTATIN CALCIUM 80 MG/1
80 TABLET, FILM COATED ORAL DAILY
Qty: 90 TABLET | Refills: 3 | Status: SHIPPED | OUTPATIENT
Start: 2025-03-05

## 2025-03-05 RX ORDER — DUTASTERIDE 0.5 MG/1
1 CAPSULE, LIQUID FILLED ORAL DAILY
Qty: 90 CAPSULE | Refills: 3 | Status: SHIPPED | OUTPATIENT
Start: 2025-03-05

## 2025-03-07 ENCOUNTER — TELEPHONE (OUTPATIENT)
Dept: INTERNAL MEDICINE | Facility: CLINIC | Age: 75
End: 2025-03-07
Payer: MEDICARE

## 2025-03-07 NOTE — TELEPHONE ENCOUNTER
"Prior Authorization Retail Medication Request    Medication/Dose: lisdexamfetamine (VYVANSE) 30 MG capsule   Diagnosis and ICD code (if different than what is on RX):  Attention deficit hyperactivity disorder (ADHD), predominantly inattentive type [F90.0]   New/renewal/insurance change PA/secondary ins. PA:  Previously Tried and Failed:   -Tried Adderall both short and long-acting approximately 4326-9759, and he was changed to Vyvanse because Adderall produced more of a \"yo-yo\" affect causing acute fatigue and drowsiness once the effect wore off  -Tried Ritalin/methylphenidate (both long and short acting) over 20 years ago by his former psychiatrist and remembers a similar  \"yo-yo\" effect along with making him \"jittery\"  Rationale:   -Vyvanse has provided the best results in managing his ADHD, improving focus and concentration with the fewest side effects, including a steady effect of this medicine throughout the day without any \"yo-yo\" effect of \"crashing\"     Insurance   Primary: MEDICARE   Insurance ID:  [id number]     Secondary (if applicable): [facility] [place] MEDICARE SUPPLEMENT   Insurance ID: [id number]     Pharmacy Information (if different than what is on RX)  Name:  [facility] - [address]  Phone: [phone]   Fax:  [phone]    Clinic Information: [facility]  Preferred routing pool for dept communication: P [facility] Nurse Solis Cifuentes RN  "

## 2025-03-11 NOTE — TELEPHONE ENCOUNTER
Central Prior Authorization Team  Phone: [phone]    PA Initiation    Medication: LISDEXAMFETAMINE DIMESYLATE 30 MG PO CAPS  Insurance Company: [employer] - Phone [phone] Fax [phone]  Pharmacy Filling the Rx: [facility] - [address]  Filling Pharmacy Phone: [phone]  Filling Pharmacy Fax: [phone]  Start Date: 3/11/2025

## 2025-03-12 NOTE — TELEPHONE ENCOUNTER
Prior Authorization Approval    Medication: LISDEXAMFETAMINE DIMESYLATE 30 MG PO CAPS  Authorization Effective Date: 1/1/2025  Authorization Expiration Date: 3/12/2026  Approved Dose/Quantity:   Reference #:     Insurance Company: [employer] - Phone [phone] Fax [phone]  Expected CoPay: $    CoPay Card Available:      Financial Assistance Needed:   Which Pharmacy is filling the prescription: [facility] - [address]  Pharmacy Notified: Yes    Patient Notified: **Instructed pharmacy to notify patient when script is ready to /ship.**

## 2025-03-17 DIAGNOSIS — E03.9 HYPOTHYROIDISM, UNSPECIFIED TYPE: ICD-10-CM

## 2025-03-17 DIAGNOSIS — E03.8 SUBCLINICAL HYPOTHYROIDISM: ICD-10-CM

## 2025-03-17 RX ORDER — LEVOTHYROXINE SODIUM 50 UG/1
50 TABLET ORAL DAILY
Qty: 90 TABLET | Refills: 0 | Status: SHIPPED | OUTPATIENT
Start: 2025-03-17

## 2025-03-30 SDOH — HEALTH STABILITY: PHYSICAL HEALTH: ON AVERAGE, HOW MANY MINUTES DO YOU ENGAGE IN EXERCISE AT THIS LEVEL?: 30 MIN

## 2025-03-30 SDOH — HEALTH STABILITY: PHYSICAL HEALTH: ON AVERAGE, HOW MANY DAYS PER WEEK DO YOU ENGAGE IN MODERATE TO STRENUOUS EXERCISE (LIKE A BRISK WALK)?: 6 DAYS

## 2025-03-30 ASSESSMENT — SOCIAL DETERMINANTS OF HEALTH (SDOH): HOW OFTEN DO YOU GET TOGETHER WITH FRIENDS OR RELATIVES?: THREE TIMES A WEEK

## 2025-03-31 ENCOUNTER — OFFICE VISIT (OUTPATIENT)
Dept: INTERNAL MEDICINE | Facility: CLINIC | Age: 75
End: 2025-03-31
Payer: MEDICARE

## 2025-03-31 VITALS
DIASTOLIC BLOOD PRESSURE: 70 MMHG | TEMPERATURE: 98.6 F | OXYGEN SATURATION: 97 % | HEIGHT: 68 IN | BODY MASS INDEX: 25.54 KG/M2 | RESPIRATION RATE: 12 BRPM | SYSTOLIC BLOOD PRESSURE: 136 MMHG | HEART RATE: 68 BPM

## 2025-03-31 DIAGNOSIS — I10 ESSENTIAL HYPERTENSION WITH GOAL BLOOD PRESSURE LESS THAN 140/90: ICD-10-CM

## 2025-03-31 DIAGNOSIS — E03.8 SUBCLINICAL HYPOTHYROIDISM: ICD-10-CM

## 2025-03-31 DIAGNOSIS — L30.9 ECZEMA, UNSPECIFIED TYPE: ICD-10-CM

## 2025-03-31 DIAGNOSIS — E78.5 HYPERLIPIDEMIA LDL GOAL <100: ICD-10-CM

## 2025-03-31 DIAGNOSIS — L98.9 BENIGN SKIN LESION: ICD-10-CM

## 2025-03-31 DIAGNOSIS — E03.9 HYPOTHYROIDISM, UNSPECIFIED TYPE: ICD-10-CM

## 2025-03-31 DIAGNOSIS — Z00.00 MEDICARE ANNUAL WELLNESS VISIT, SUBSEQUENT: Primary | ICD-10-CM

## 2025-03-31 DIAGNOSIS — N40.0 BENIGN PROSTATIC HYPERPLASIA WITHOUT LOWER URINARY TRACT SYMPTOMS: ICD-10-CM

## 2025-03-31 DIAGNOSIS — F90.0 ATTENTION DEFICIT HYPERACTIVITY DISORDER (ADHD), PREDOMINANTLY INATTENTIVE TYPE: ICD-10-CM

## 2025-03-31 PROCEDURE — G0439 PPPS, SUBSEQ VISIT: HCPCS | Performed by: INTERNAL MEDICINE

## 2025-03-31 PROCEDURE — G2211 COMPLEX E/M VISIT ADD ON: HCPCS | Performed by: INTERNAL MEDICINE

## 2025-03-31 PROCEDURE — 3078F DIAST BP <80 MM HG: CPT | Performed by: INTERNAL MEDICINE

## 2025-03-31 PROCEDURE — 1126F AMNT PAIN NOTED NONE PRSNT: CPT | Performed by: INTERNAL MEDICINE

## 2025-03-31 PROCEDURE — 99214 OFFICE O/P EST MOD 30 MIN: CPT | Mod: 25 | Performed by: INTERNAL MEDICINE

## 2025-03-31 PROCEDURE — 3075F SYST BP GE 130 - 139MM HG: CPT | Performed by: INTERNAL MEDICINE

## 2025-03-31 RX ORDER — LEVOTHYROXINE SODIUM 50 UG/1
50 TABLET ORAL DAILY
Qty: 90 TABLET | Refills: 3 | Status: SHIPPED | OUTPATIENT
Start: 2025-03-31

## 2025-03-31 RX ORDER — LISINOPRIL 10 MG/1
10 TABLET ORAL DAILY
Qty: 90 TABLET | Refills: 3 | Status: SHIPPED | OUTPATIENT
Start: 2025-03-31

## 2025-03-31 RX ORDER — CLOBETASOL PROPIONATE 0.5 MG/G
CREAM TOPICAL
Qty: 30 G | Refills: 1 | Status: SHIPPED | OUTPATIENT
Start: 2025-03-31

## 2025-03-31 ASSESSMENT — ANXIETY QUESTIONNAIRES
GAD7 TOTAL SCORE: 0
2. NOT BEING ABLE TO STOP OR CONTROL WORRYING: NOT AT ALL
1. FEELING NERVOUS, ANXIOUS, OR ON EDGE: NOT AT ALL
5. BEING SO RESTLESS THAT IT IS HARD TO SIT STILL: NOT AT ALL
IF YOU CHECKED OFF ANY PROBLEMS ON THIS QUESTIONNAIRE, HOW DIFFICULT HAVE THESE PROBLEMS MADE IT FOR YOU TO DO YOUR WORK, TAKE CARE OF THINGS AT HOME, OR GET ALONG WITH OTHER PEOPLE: SOMEWHAT DIFFICULT
6. BECOMING EASILY ANNOYED OR IRRITABLE: NOT AT ALL
8. IF YOU CHECKED OFF ANY PROBLEMS, HOW DIFFICULT HAVE THESE MADE IT FOR YOU TO DO YOUR WORK, TAKE CARE OF THINGS AT HOME, OR GET ALONG WITH OTHER PEOPLE?: SOMEWHAT DIFFICULT
7. FEELING AFRAID AS IF SOMETHING AWFUL MIGHT HAPPEN: NOT AT ALL
GAD7 TOTAL SCORE: 0
3. WORRYING TOO MUCH ABOUT DIFFERENT THINGS: NOT AT ALL
GAD7 TOTAL SCORE: 0
7. FEELING AFRAID AS IF SOMETHING AWFUL MIGHT HAPPEN: NOT AT ALL
4. TROUBLE RELAXING: NOT AT ALL

## 2025-03-31 ASSESSMENT — PAIN SCALES - GENERAL: PAINLEVEL_OUTOF10: NO PAIN (0)

## 2025-03-31 NOTE — PATIENT INSTRUCTIONS
" OK to taper off the venlafaxine    Continue all other medications at the same doses.  Contact your usual pharmacy if you need refills.         5 GOALS TO PREVENT VASCULAR DISEASE:     1.  Aggressive blood pressure control, under 130/80 ideally.  Using medications if needed.    Your blood pressure is under good control    BP Readings from Last 4 Encounters:   03/31/25 136/70   11/22/24 134/66   09/17/24 134/70   02/26/24 138/76       2.  Aggressive LDL cholesterol (\"bad cholesterol\") lowering as indicated.    Your goal is an LDL under 130 for sure, preferably under 100.  (If you have diabetes or previous vascular disease, the the LDL goals would be under 100 for sure, preferably under 70.)    New guidelines identify four high-risk groups who could benefit from statins:   *people with pre-existing heart disease, such as those who have had a heart attack;   *people ages 40 to 75 who have diabetes of any type  *patients ages 40 to 75 with at least a 7.5% risk of developing cardiovascular disease over the next decade, according to a formula described in the guidelines  *patients with the sort of super-high cholesterol that sometimes runs in families, as evidenced by an LDL of 190 milligrams per deciliter or higher    Your cholesterol levels are well controlled.    Recent Labs   Lab Test 03/21/25  1042 02/22/24  1346   CHOL 142 149   HDL 58 52   LDL 74 79   TRIG 52 91       --LDL (\"bad\" cholesterol): normal under 130, ideal under 100.  Best way to lower this is through better food choices ( lower fats, etc.), medication may be considered if indicated  --HDL (\"good\") cholesterol: (normal above 40 for men, above 50 for women).  Best way to improve the HDL level is through regular physical exercise.  There are no medications to raise HDL levels.   --Triglycerides (desirable under 150): triglycerides are more about metabolic issues, best way to improve this is through better diet choices, emphasizing reducing the intake of " "\"simple carbohydrates\" (e.g. White bread, white rice, pasta, noodles, potatoes, snack foods, regular soda, juices (except fresh squeezed), cakes, cookies, candy, etc.) as best possible. Medications may be considered for triglyceride levels routinely above 400.    3.  Aggressive diabetic prevention, screening and/or management.      You do not have diabetes as of the most recent blood tests.     4.  No smoking    5.  Consider daily preventative aspirin over age 50 if you have enough cardiac risk factors to place you at higher risk for the presence of vascular disease.    If you have any reason not to take aspirin such easy bruising or bleeding, stomach problems, other anticoagulant medications, or any other side effects, then you should not take Aspirin.     --Based on your current cardiac disease risk profile and/or age over 75, you do NOT need to take daily preventative aspirin.        Preventive Health Recommendations:   Male Ages 75 and over    Yearly exam:             See your health care provider every year in order to  o   Review health changes.   o   Discuss preventive care.    o   Review your medicines if your doctor has prescribed any.  Regular screening for diabetes. If you are at risk for diabetes, you should have this test more often.  At least every 5 years, have a cholesterol test. Have this test more often if you are at risk for high cholesterol or heart disease.   Routine colon cancer screening no longer indicated over age 80.  (If you ever need a colonoscopy over age 80, we would perform it but it would be a \"diagnostic\" colonoscopy)  Routine prostate cancer screening no longer indicated over age 75.  Talk to with your health care provider about screening for Abdominal Aortic Aneurysm if you have a family history of AAA or have a history of smoking.        Vaccine recommendations:   Get a flu shot each fall.  Middle October is the optimal time to receive the flu shot.   Covid vaccines are now " recommended annually.  Get the most updated Covid vaccine when it becomes available, consider getting this at the same time as the annual influenza vaccine.   Get a tetanus shot every 10 years. (Get this vaccine from a pharmacist in a pharmacy when you are over 65 on Medicare insurance)  Everyone over 65 should make sure to receive pneumonia vaccines to prevent the most common type of bacterial pneumonia: Pneumococcal pneumonia. There are now two you should receive - Pneumovax (PPSV 23) and Prevnar (PCV 20)  Strongly consider the Shingrix shingles vaccine to give you the best chance of avoiding future shingles infection (as many as 1 and 3 adults over age 50 may develop this condition in their lifetime).      --If you have Medicare insurance, investigate the cost and coverage for Shingrix shingles vaccines with a pharmacist at a pharmacy.  They can tell you the coverage and cost and then give it to you if the price is acceptable.    --Medicare sometimes does not cover these Shingrix shingles vaccines, and with Medicare insurance it is usually cheaper to receive this shingles vaccine from a pharmacist in a pharmacy rather than in our clinic.    --At this time, you only need the 2 Shingrix vaccines and then you are done.    Consider vaccination against Respiratory Syncytial Virus (RSV) infections, especially if you have active lung disease, history of smoking, and/or cardiac conditions.  The respiratory syncytial virus (RSV), is a common upper respiratory virus that causes severe inflammation in the airways, more than most upper respiratory viruses.   This vaccine is available at most pharmacies and clinics.  At this time, the RSV vaccine is a one time vaccine.    --If you are on Medicare insurance, you need to specifically get this vaccine from a pharmacist in a pharmacy for the best cost and to confirm coverage, it will be less expensive to get this there rather than from our clinic.         Nutrition:   Eat at  "least 5 servings of fruits and vegetables each day.   Eat whole-grain bread, whole-wheat pasta and brown rice instead of white grains and rice.   Talk to your provider about Calcium and Vitamin D.      --Good Grains:  Oats, brown rice, Quinoa (these do not raise the blood sugar as much)     --Bad grains:  Anything made from wheat or white rice     (because these raise the blood sugars significantly, and the possible gluten issue from wheat for some people).      --Proteins:  Aim for \"lean proteins\" including chicken, fish, seafood, pork, turkey, and eggs (in moderation); Eat red meat only occasionally    Lifestyle  Exercise for at least 150 minutes a week (30 minutes a day, 5 days a week). This will help you control your weight and prevent disease.   Limit alcohol to one drink per day.   No smoking.   Wear sunscreen to prevent skin cancer.   See your dentist every six months for an exam and cleaning.   See your eye doctor every 1 to 2 years to screen for conditions such as glaucoma, macular degeneration, cataracts, etc       "

## 2025-03-31 NOTE — PROGRESS NOTES
Preventive Care Visit  [facility]  Marcelo Moseley MD, Internal Medicine    Mar 31, 2025      Assessment & Plan     (Z00.00) Medicare annual wellness visit, subsequent  (primary encounter diagnosis)  Comment: Discussed cardiac disease risk factors and cardiac disease risk factor modification, including diabetes screening, blood pressure screening (and management if indicated), and cholesterol screening.   Reviewed immunzation guidelines, including pneumococcal vaccines, annual influenza, and shingles vaccines.   Discussed routine cancer screenings, including skin cancer, colon cancer screening for everyone until age 80, prostate cancer screening in men until age 75, mammogram and PAP/pelvic for women until age 75.   Recommended regular dentist visits to care for remaining teeth.   Recommended regular screening for vision and glaucoma.   Recommended safe driving and accident avoidance.    Counseling:    Reviewed preventive health counseling, as reflected in patient instructions       Regular exercise       Healthy diet/nutrition       Vision screening       Hearing screening       Immunizations             Colorectal cancer screening       Prostate cancer screening         Patient has been advised of split billing requirements and indicates understanding: Yes   Plan: REVIEW OF HEALTH MAINTENANCE PROTOCOL ORDERS            (E78.5) Hyperlipidemia LDL goal <100  Comment: This condition is currently controlled on the current medical regimen.  Continue current therapy.   Plan: REVIEW OF HEALTH MAINTENANCE PROTOCOL ORDERS            (L30.9) Eczema, unspecified type  Comment: This condition is currently controlled on the current medical regimen.  Continue current therapy.   Plan: REVIEW OF HEALTH MAINTENANCE PROTOCOL ORDERS,         clobetasol propionate (TEMOVATE) 0.05 %         external cream            (N40.0) Benign prostatic hyperplasia without lower urinary tract symptoms  Comment: This  "condition is currently controlled on the current medical regimen.  Continue current therapy.   Plan: REVIEW OF HEALTH MAINTENANCE PROTOCOL ORDERS            (E03.8) Subclinical hypothyroidism  Comment: This condition is currently controlled on the current medical regimen.  Continue current therapy.   Plan: REVIEW OF HEALTH MAINTENANCE PROTOCOL ORDERS,         levothyroxine (SYNTHROID/LEVOTHROID) 50 MCG         tablet            (E03.9) Hypothyroidism, unspecified type  Comment:   Plan: REVIEW OF HEALTH MAINTENANCE PROTOCOL ORDERS,         levothyroxine (SYNTHROID/LEVOTHROID) 50 MCG         tablet            (L98.9) Benign skin lesion  Comment:   Plan: REVIEW OF HEALTH MAINTENANCE PROTOCOL ORDERS,         Adult Dermatology  Referral            (I10) Essential hypertension with goal blood pressure less than 140/90  Comment: This condition is currently controlled on the current medical regimen.  Continue current therapy.   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   Goal if to taper off compeltely eventually.  He does not need the medicatio jb the same he did when he was still working.   Plan:        Patient has been advised of split billing requirements and indicates understanding: Yes        BMI  Estimated body mass index is 25.54 kg/m  as calculated from the following:    Height as of this encounter: 1.715 m (5' 7.5\").    Weight as of 11/22/24: 75.1 kg (165 lb 8 oz).       Counseling  Appropriate preventive services were addressed with this patient via screening, questionnaire, or discussion as appropriate for fall prevention, nutrition, physical activity, Tobacco-use cessation, social engagement, weight loss and cognition.  Checklist reviewing preventive services " available has been given to the patient.          Pancho Davidson is a 75 year old, presenting for the following:  Medicare Visit        3/31/2025    12:48 PM   Additional Questions   Roomed by Alanis SHERWOOD CMA     HPI       1.)  On Adderall for ADD.    They state that they have not experienced any significant side effects of this medication.  Appetite has remained stable, no chest pains, no palpitations.  No effect on ability to exercise vigorously.   No insomnia or sleeping problems.    No significant mood changes.  States that this medication has helped their concentration.   They have been responsible with regard to RXs for this medication, no unusual or suspect behavior with this medication.    Wt Readings from Last 10 Encounters:   11/22/24 75.1 kg (165 lb 8 oz)   09/17/24 76.3 kg (168 lb 3.2 oz)   02/26/24 82.1 kg (180 lb 14.4 oz)   08/18/23 81.3 kg (179 lb 4.8 oz)   02/07/23 86.6 kg (190 lb 14.4 oz)   06/28/22 82.6 kg (182 lb)   12/06/21 85.5 kg (188 lb 6.4 oz)   05/19/21 83.9 kg (185 lb)   02/04/20 88.5 kg (195 lb)   08/26/19 86.2 kg (190 lb)        2.)  Hypertension:  History of hypertension, on medication.  No reported side effects from medications.    Reviewed last 6 BP readings in chart:  BP Readings from Last 6 Encounters:   03/31/25 136/70   11/22/24 134/66   09/17/24 134/70   02/26/24 138/76   08/18/23 132/66   02/07/23 130/64     No active cardiac complaints or symptoms with exercise.     3.)  Hyperlipidemia:  Has history of hyperlipidemia.    The patient is taking a medication for this.  Denies any significant side effects from his medication.      Latest labs reviewed:    Recent Labs   Lab Test 03/21/25  1042 02/22/24  1346   CHOL 142 149   HDL 58 52   LDL 74 79   TRIG 52 91        Lab Results   Component Value Date    AST 37 05/21/2022    AST 38 05/19/2021         4.)  eczema managed well, asking for refill on prn steroid cream.       Advance Care Planning  Patient has a Health Care Directive on  file  Advance care planning document is on file and is current.      3/30/2025   General Health   How would you rate your overall physical health? Good   Feel stress (tense, anxious, or unable to sleep) Only a little   (!) STRESS CONCERN      3/30/2025   Nutrition   Diet: Regular (no restrictions)         3/30/2025   Exercise   Days per week of moderate/strenous exercise 6 days   Average minutes spent exercising at this level 30 min         3/30/2025   Social Factors   Frequency of gathering with friends or relatives Three times a week   Worry food won't last until get money to buy more No   Food not last or not have enough money for food? No   Do you have housing? (Housing is defined as stable permanent housing and does not include staying ouside in a car, in a tent, in an abandoned building, in an overnight shelter, or couch-surfing.) Yes   Are you worried about losing your housing? No   Lack of transportation? No   Unable to get utilities (heat,electricity)? No         3/30/2025   Fall Risk   Fallen 2 or more times in the past year? No   Trouble with walking or balance? No          3/30/2025   Activities of Daily Living- Home Safety   Needs help with the following daily activites None of the above   Safety concerns in the home None of the above         3/30/2025   Dental   Dentist two times every year? Yes         3/30/2025   Hearing Screening   Hearing concerns? (!) IT'S HARD TO FOLLOW A CONVERSATION IN A NOISY RESTAURANT OR CROWDED ROOM.         3/30/2025   Driving Risk Screening   Patient/family members have concerns about driving No         3/30/2025   General Alertness/Fatigue Screening   Have you been more tired than usual lately? No         3/30/2025   Urinary Incontinence Screening   Bothered by leaking urine in past 6 months No           9/17/2024   TB Screening   Were you born outside of the [place]? No         Today's PHQ-9 Score:       3/30/2025     7:29 PM   PHQ-9 SCORE   PHQ-9 Total Score MyChart 1  (Minimal depression)   PHQ-9 Total Score 1        Patient-reported         3/30/2025   Substance Use   Alcohol more than 3/day or more than 7/wk Not Applicable   Do you have a current opioid prescription? No   How severe/bad is pain from 1 to 10? 0/10 (No Pain)   Do you use any other substances recreationally? No     Social History     Tobacco Use     Smoking status: Never     Smokeless tobacco: Never   Vaping Use     Vaping status: Never Used   Substance Use Topics     Alcohol use: No     Drug use: No           3/30/2025   AAA Screening   Family history of Abdominal Aortic Aneurysm (AAA)? No   ASCVD Risk   The 10-year ASCVD risk score (Ruth CUNNINGHAM, et al., 2019) is: 26.9%    Values used to calculate the score:      Age: 75 years      Sex: Male      Is Non- : No      Diabetic: No      Tobacco smoker: No      Systolic Blood Pressure: 136 mmHg      Is BP treated: Yes      HDL Cholesterol: 58 mg/dL      Total Cholesterol: 142 mg/dL            Reviewed and updated as needed this visit by Provider                    **I reviewed the information recorded in the patient's EPIC chart (including but not limited to medical history, surgical history, family history, problem list, medication list, and allergy list) and updated the information as indicated based on the patients reported information.         Current providers sharing in care for this patient include:  Patient Care Team:  Marcelo Moseley MD as PCP - General (Internal Medicine)  Marcelo Moseley MD as Assigned PCP  Mary Reyes PA-C as Physician Assistant (Dermatology)    The following health maintenance items are reviewed in Epic and correct as of today:  Health Maintenance   Topic Date Due     DTAP/TDAP/TD IMMUNIZATION (1 - Tdap) 07/26/2017     COVID-19 Vaccine (7 - 2024-25 season) 03/09/2025     PHQ-9  09/30/2025     ALT  03/21/2026     BMP  03/21/2026     LIPID  03/21/2026     PSA  03/21/2026     TSH  "W/FREE T4 REFLEX  03/21/2026     CBC  03/21/2026     MEDICARE ANNUAL WELLNESS VISIT  03/31/2026     NATHALIA ASSESSMENT  03/31/2026     ANNUAL REVIEW OF HM ORDERS  03/31/2026     FALL RISK ASSESSMENT  03/31/2026     DIABETES SCREENING  03/21/2028     ADVANCE CARE PLANNING  04/14/2030     COLORECTAL CANCER SCREENING  10/01/2031     HEPATITIS C SCREENING  Completed     DEPRESSION ACTION PLAN  Completed     INFLUENZA VACCINE  Completed     Pneumococcal Vaccine: 50+ Years  Completed     ZOSTER IMMUNIZATION  Completed     RSV VACCINE  Completed     HPV IMMUNIZATION  Aged Out     MENINGITIS IMMUNIZATION  Aged Out         Review of Systems  Constitutional, neuro, ENT, endocrine, pulmonary, cardiac, gastrointestinal, genitourinary, musculoskeletal, integument and psychiatric systems are negative, except as otherwise noted.     Objective    Exam  /70   Pulse 68   Temp 98.6  F (37  C) (Temporal)   Resp 12   Ht 1.715 m (5' 7.5\")   SpO2 97%   BMI 25.54 kg/m     Estimated body mass index is 25.54 kg/m  as calculated from the following:    Height as of this encounter: 1.715 m (5' 7.5\").    Weight as of 11/22/24: 75.1 kg (165 lb 8 oz).    Physical Exam  Physical Exam  Vitals and nursing note reviewed.   Constitutional:       Comments: Moves normally, alert, no apparent distress  HENT:      Head: Normocephalic and atraumatic.      Nose: Nose normal.   Eyes:      Extraocular Movements: Extraocular movements intact.   Cardiovascular:      Rate and Rhythm: Normal rate and regular rhythm.      Heart sounds: Normal heart sounds.   Pulmonary:      Effort: Pulmonary effort is normal.      Breath sounds: Normal breath sounds.   Abdominal:      General: There is no distension.      Palpations: There is no mass.      Tenderness: No abdominal tenderness, no distention.     Bowel sounds: normal  Musculoskeletal:         General: Normal range of motion, moves into the room normal, moves in and out of chair normally.    Skin:     General: " Skin is warm and dry.   Neurological:      General: No focal deficit present.      Mental Status: Alert, responds to questions, Speech coherent  Psychiatric:         Mood and Affect: Mood normal.          3/31/2025   Mini Cog   Clock Draw Score 2 Normal   3 Item Recall 3 objects recalled   Mini Cog Total Score 5            The longitudinal plan of care for the diagnosis(es)/condition(s) as documented were addressed during this visit. Due to the added complexity in care, I will continue to support Stuart in the subsequent management and with ongoing continuity of care.        Signed Electronically by: Marcelo Moseley MD

## 2025-04-06 ENCOUNTER — MYC MEDICAL ADVICE (OUTPATIENT)
Dept: INTERNAL MEDICINE | Facility: CLINIC | Age: 75
End: 2025-04-06
Payer: MEDICARE

## 2025-04-07 ENCOUNTER — MYC REFILL (OUTPATIENT)
Dept: INTERNAL MEDICINE | Facility: CLINIC | Age: 75
End: 2025-04-07
Payer: MEDICARE

## 2025-04-07 DIAGNOSIS — F90.0 ATTENTION DEFICIT HYPERACTIVITY DISORDER (ADHD), PREDOMINANTLY INATTENTIVE TYPE: ICD-10-CM

## 2025-04-07 RX ORDER — LISDEXAMFETAMINE DIMESYLATE 30 MG/1
30 CAPSULE ORAL EVERY MORNING
Qty: 90 CAPSULE | Refills: 0 | Status: SHIPPED | OUTPATIENT
Start: 2025-04-07

## 2025-04-08 ENCOUNTER — MYC REFILL (OUTPATIENT)
Dept: INTERNAL MEDICINE | Facility: CLINIC | Age: 75
End: 2025-04-08
Payer: MEDICARE

## 2025-04-08 DIAGNOSIS — F90.0 ATTENTION DEFICIT HYPERACTIVITY DISORDER (ADHD), PREDOMINANTLY INATTENTIVE TYPE: ICD-10-CM

## 2025-04-09 RX ORDER — DEXTROAMPHETAMINE SACCHARATE, AMPHETAMINE ASPARTATE, DEXTROAMPHETAMINE SULFATE AND AMPHETAMINE SULFATE 2.5; 2.5; 2.5; 2.5 MG/1; MG/1; MG/1; MG/1
5-10 TABLET ORAL 2 TIMES DAILY PRN
Qty: 60 TABLET | Refills: 0 | Status: SHIPPED | OUTPATIENT
Start: 2025-04-09

## (undated) DEVICE — BUR OVAL LINVATEC 7X70MM CARBIDE 5092-236

## (undated) DEVICE — PACK HEAD NECK SEN15HNFSF

## (undated) DEVICE — GLOVE PROTEXIS W/NEU-THERA 6.5  2D73TE65

## (undated) DEVICE — BUR ROUND 4MM CARBIDE LONG 5092-228

## (undated) DEVICE — GLOVE PROTEXIS POWDER FREE 7.5 ORTHOPEDIC 2D73ET75

## (undated) DEVICE — BUR ROUND 2MM CARBIDE LONG 5092-224

## (undated) DEVICE — SUCTION CANISTER MEDIVAC LINER 3000ML W/LID 65651-530

## (undated) DEVICE — ESU GROUND PAD UNIVERSAL W/O CORD

## (undated) DEVICE — SOL NACL 0.9% IRRIG 1000ML BOTTLE 07138-09

## (undated) DEVICE — BUR OVAL LINVATEC 4X8MM 5091-236

## (undated) DEVICE — SPONGE PACK VAGINAL 2X36"

## (undated) DEVICE — DRILL TWIST STRK 1.6MM XLG 9216820

## (undated) DEVICE — SU VICRYL 4-0 PS-2 18" UND J496H

## (undated) DEVICE — LINEN TOWEL PACK X5 5464

## (undated) DEVICE — Device

## (undated) DEVICE — DECANTER VIAL 2006S

## (undated) DEVICE — SPONGE RAY-TEC 4X4" 7317

## (undated) DEVICE — GLOVE PROTEXIS POWDER FREE 8.0 ORTHOPEDIC 2D73ET80

## (undated) RX ORDER — HYDROMORPHONE HYDROCHLORIDE 1 MG/ML
INJECTION, SOLUTION INTRAMUSCULAR; INTRAVENOUS; SUBCUTANEOUS
Status: DISPENSED
Start: 2017-11-29

## (undated) RX ORDER — FENTANYL CITRATE 50 UG/ML
INJECTION, SOLUTION INTRAMUSCULAR; INTRAVENOUS
Status: DISPENSED
Start: 2017-11-29

## (undated) RX ORDER — HYDRALAZINE HYDROCHLORIDE 20 MG/ML
INJECTION INTRAMUSCULAR; INTRAVENOUS
Status: DISPENSED
Start: 2017-11-29

## (undated) RX ORDER — VECURONIUM BROMIDE 1 MG/ML
INJECTION, POWDER, LYOPHILIZED, FOR SOLUTION INTRAVENOUS
Status: DISPENSED
Start: 2017-11-29

## (undated) RX ORDER — PROPOFOL 10 MG/ML
INJECTION, EMULSION INTRAVENOUS
Status: DISPENSED
Start: 2017-11-29

## (undated) RX ORDER — DEXAMETHASONE SODIUM PHOSPHATE 4 MG/ML
INJECTION, SOLUTION INTRA-ARTICULAR; INTRALESIONAL; INTRAMUSCULAR; INTRAVENOUS; SOFT TISSUE
Status: DISPENSED
Start: 2017-11-29

## (undated) RX ORDER — CEFAZOLIN SODIUM 2 G/100ML
INJECTION, SOLUTION INTRAVENOUS
Status: DISPENSED
Start: 2017-11-29

## (undated) RX ORDER — LIDOCAINE HYDROCHLORIDE AND EPINEPHRINE BITARTRATE 20; .01 MG/ML; MG/ML
INJECTION, SOLUTION SUBCUTANEOUS
Status: DISPENSED
Start: 2017-11-29

## (undated) RX ORDER — OXYMETAZOLINE HYDROCHLORIDE 0.05 G/100ML
SPRAY NASAL
Status: DISPENSED
Start: 2017-11-29

## (undated) RX ORDER — BENZOCAINE/MENTHOL 6 MG-10 MG
LOZENGE MUCOUS MEMBRANE
Status: DISPENSED
Start: 2017-11-29

## (undated) RX ORDER — LIDOCAINE HYDROCHLORIDE 20 MG/ML
INJECTION, SOLUTION EPIDURAL; INFILTRATION; INTRACAUDAL; PERINEURAL
Status: DISPENSED
Start: 2017-11-29

## (undated) RX ORDER — ONDANSETRON 2 MG/ML
INJECTION INTRAMUSCULAR; INTRAVENOUS
Status: DISPENSED
Start: 2017-11-29

## (undated) RX ORDER — CHLORHEXIDINE GLUCONATE ORAL RINSE 1.2 MG/ML
SOLUTION DENTAL
Status: DISPENSED
Start: 2017-11-29